# Patient Record
Sex: MALE | Race: WHITE | NOT HISPANIC OR LATINO | Employment: OTHER | URBAN - METROPOLITAN AREA
[De-identification: names, ages, dates, MRNs, and addresses within clinical notes are randomized per-mention and may not be internally consistent; named-entity substitution may affect disease eponyms.]

---

## 2018-05-11 ENCOUNTER — HOSPITAL ENCOUNTER (INPATIENT)
Facility: HOSPITAL | Age: 78
LOS: 2 days | Discharge: HOME | DRG: 384 | End: 2018-05-13
Attending: EMERGENCY MEDICINE | Admitting: INTERNAL MEDICINE
Payer: COMMERCIAL

## 2018-05-11 ENCOUNTER — APPOINTMENT (EMERGENCY)
Dept: CT IMAGING | Facility: HOSPITAL | Age: 78
DRG: 384 | End: 2018-05-11
Payer: COMMERCIAL

## 2018-05-11 DIAGNOSIS — K26.4: ICD-10-CM

## 2018-05-11 DIAGNOSIS — K29.80 DUODENITIS: Primary | ICD-10-CM

## 2018-05-11 DIAGNOSIS — K59.00 CONSTIPATION, UNSPECIFIED CONSTIPATION TYPE: ICD-10-CM

## 2018-05-11 PROBLEM — I10 HYPERTENSION: Status: ACTIVE | Noted: 2018-05-11

## 2018-05-11 PROBLEM — E11.9 DM2 (DIABETES MELLITUS, TYPE 2) (HCC): Status: ACTIVE | Noted: 2018-05-11

## 2018-05-11 PROBLEM — J44.9 COPD (CHRONIC OBSTRUCTIVE PULMONARY DISEASE) (HCC): Status: ACTIVE | Noted: 2018-05-11

## 2018-05-11 PROBLEM — N18.30 STAGE 3 CHRONIC KIDNEY DISEASE (HCC): Status: ACTIVE | Noted: 2018-05-11

## 2018-05-11 PROBLEM — I73.9 PAD (PERIPHERAL ARTERY DISEASE) (HCC): Status: ACTIVE | Noted: 2018-05-11

## 2018-05-11 LAB
ALBUMIN SERPL BCP-MCNC: 3.7 G/DL (ref 3.5–5)
ALP SERPL-CCNC: 90 U/L (ref 46–116)
ALT SERPL W P-5'-P-CCNC: 25 U/L (ref 12–78)
ANION GAP SERPL CALCULATED.3IONS-SCNC: 12 MMOL/L (ref 4–13)
AST SERPL W P-5'-P-CCNC: 22 U/L (ref 5–45)
BACTERIA UR QL AUTO: ABNORMAL /HPF
BASOPHILS # BLD MANUAL: 0 THOUSAND/UL (ref 0–0.1)
BASOPHILS NFR MAR MANUAL: 0 % (ref 0–1)
BILIRUB SERPL-MCNC: 0.4 MG/DL (ref 0.2–1)
BILIRUB UR QL STRIP: NEGATIVE
BUN SERPL-MCNC: 34 MG/DL (ref 5–25)
CALCIUM SERPL-MCNC: 9.5 MG/DL (ref 8.3–10.1)
CHLORIDE SERPL-SCNC: 98 MMOL/L (ref 100–108)
CLARITY UR: CLEAR
CO2 SERPL-SCNC: 24 MMOL/L (ref 21–32)
COLOR UR: YELLOW
CREAT SERPL-MCNC: 1.77 MG/DL (ref 0.6–1.3)
EOSINOPHIL # BLD MANUAL: 0.16 THOUSAND/UL (ref 0–0.4)
EOSINOPHIL NFR BLD MANUAL: 1 % (ref 0–6)
ERYTHROCYTE [DISTWIDTH] IN BLOOD BY AUTOMATED COUNT: 12.8 % (ref 11.6–15.1)
GFR SERPL CREATININE-BSD FRML MDRD: 36 ML/MIN/1.73SQ M
GLUCOSE SERPL-MCNC: 128 MG/DL (ref 65–140)
GLUCOSE SERPL-MCNC: 151 MG/DL (ref 65–140)
GLUCOSE SERPL-MCNC: 202 MG/DL (ref 65–140)
GLUCOSE UR STRIP-MCNC: NEGATIVE MG/DL
HCT VFR BLD AUTO: 37.9 % (ref 36.5–49.3)
HGB BLD-MCNC: 12.9 G/DL (ref 12–17)
HGB UR QL STRIP.AUTO: NEGATIVE
KETONES UR STRIP-MCNC: NEGATIVE MG/DL
LEUKOCYTE ESTERASE UR QL STRIP: NEGATIVE
LIPASE SERPL-CCNC: 257 U/L (ref 73–393)
LYMPHOCYTES # BLD AUTO: 17 % (ref 14–44)
LYMPHOCYTES # BLD AUTO: 2.68 THOUSAND/UL (ref 0.6–4.47)
MCH RBC QN AUTO: 33.3 PG (ref 26.8–34.3)
MCHC RBC AUTO-ENTMCNC: 34 G/DL (ref 31.4–37.4)
MCV RBC AUTO: 98 FL (ref 82–98)
METAMYELOCYTES NFR BLD MANUAL: 1 % (ref 0–1)
MONOCYTES # BLD AUTO: 0.95 THOUSAND/UL (ref 0–1.22)
MONOCYTES NFR BLD: 6 % (ref 4–12)
NEUTROPHILS # BLD MANUAL: 11.83 THOUSAND/UL (ref 1.85–7.62)
NEUTS SEG NFR BLD AUTO: 75 % (ref 43–75)
NITRITE UR QL STRIP: NEGATIVE
NON-SQ EPI CELLS URNS QL MICRO: ABNORMAL /HPF
PH UR STRIP.AUTO: 6.5 [PH] (ref 4.5–8)
PLATELET # BLD AUTO: 297 THOUSANDS/UL (ref 149–390)
PLATELET BLD QL SMEAR: ADEQUATE
PMV BLD AUTO: 10.7 FL (ref 8.9–12.7)
POTASSIUM SERPL-SCNC: 4.2 MMOL/L (ref 3.5–5.3)
PROT SERPL-MCNC: 7.9 G/DL (ref 6.4–8.2)
PROT UR STRIP-MCNC: ABNORMAL MG/DL
RBC # BLD AUTO: 3.87 MILLION/UL (ref 3.88–5.62)
RBC #/AREA URNS AUTO: ABNORMAL /HPF
SODIUM SERPL-SCNC: 134 MMOL/L (ref 136–145)
SP GR UR STRIP.AUTO: 1.02 (ref 1–1.03)
TOTAL CELLS COUNTED SPEC: 100
UROBILINOGEN UR QL STRIP.AUTO: 1 E.U./DL
WBC # BLD AUTO: 15.77 THOUSAND/UL (ref 4.31–10.16)
WBC #/AREA URNS AUTO: ABNORMAL /HPF

## 2018-05-11 PROCEDURE — 94640 AIRWAY INHALATION TREATMENT: CPT

## 2018-05-11 PROCEDURE — 99223 1ST HOSP IP/OBS HIGH 75: CPT | Performed by: INTERNAL MEDICINE

## 2018-05-11 PROCEDURE — 99285 EMERGENCY DEPT VISIT HI MDM: CPT

## 2018-05-11 PROCEDURE — 85007 BL SMEAR W/DIFF WBC COUNT: CPT | Performed by: PHYSICIAN ASSISTANT

## 2018-05-11 PROCEDURE — 81001 URINALYSIS AUTO W/SCOPE: CPT

## 2018-05-11 PROCEDURE — C9113 INJ PANTOPRAZOLE SODIUM, VIA: HCPCS | Performed by: PHYSICIAN ASSISTANT

## 2018-05-11 PROCEDURE — 99222 1ST HOSP IP/OBS MODERATE 55: CPT | Performed by: INTERNAL MEDICINE

## 2018-05-11 PROCEDURE — 83690 ASSAY OF LIPASE: CPT | Performed by: PHYSICIAN ASSISTANT

## 2018-05-11 PROCEDURE — 74176 CT ABD & PELVIS W/O CONTRAST: CPT

## 2018-05-11 PROCEDURE — 80053 COMPREHEN METABOLIC PANEL: CPT | Performed by: PHYSICIAN ASSISTANT

## 2018-05-11 PROCEDURE — 85027 COMPLETE CBC AUTOMATED: CPT | Performed by: PHYSICIAN ASSISTANT

## 2018-05-11 PROCEDURE — 36415 COLL VENOUS BLD VENIPUNCTURE: CPT | Performed by: PHYSICIAN ASSISTANT

## 2018-05-11 PROCEDURE — 82948 REAGENT STRIP/BLOOD GLUCOSE: CPT

## 2018-05-11 PROCEDURE — 94760 N-INVAS EAR/PLS OXIMETRY 1: CPT

## 2018-05-11 RX ORDER — OXYCODONE HYDROCHLORIDE 5 MG/1
5 TABLET ORAL EVERY 4 HOURS PRN
Status: DISCONTINUED | OUTPATIENT
Start: 2018-05-11 | End: 2018-05-11

## 2018-05-11 RX ORDER — HYDRALAZINE HYDROCHLORIDE 20 MG/ML
10 INJECTION INTRAMUSCULAR; INTRAVENOUS EVERY 6 HOURS PRN
Status: DISCONTINUED | OUTPATIENT
Start: 2018-05-11 | End: 2018-05-13 | Stop reason: HOSPADM

## 2018-05-11 RX ORDER — NICOTINE 21 MG/24HR
1 PATCH, TRANSDERMAL 24 HOURS TRANSDERMAL DAILY
Status: DISCONTINUED | OUTPATIENT
Start: 2018-05-11 | End: 2018-05-13 | Stop reason: HOSPADM

## 2018-05-11 RX ORDER — ACETAMINOPHEN 325 MG/1
650 TABLET ORAL EVERY 6 HOURS PRN
Status: DISCONTINUED | OUTPATIENT
Start: 2018-05-11 | End: 2018-05-11

## 2018-05-11 RX ORDER — AMLODIPINE BESYLATE 5 MG/1
10 TABLET ORAL DAILY
Status: DISCONTINUED | OUTPATIENT
Start: 2018-05-12 | End: 2018-05-13 | Stop reason: HOSPADM

## 2018-05-11 RX ORDER — HYDROCHLOROTHIAZIDE 25 MG/1
25 TABLET ORAL DAILY
COMMUNITY
End: 2018-12-09 | Stop reason: ALTCHOICE

## 2018-05-11 RX ORDER — HYDROCHLOROTHIAZIDE 25 MG/1
25 TABLET ORAL DAILY
Status: DISCONTINUED | OUTPATIENT
Start: 2018-05-12 | End: 2018-05-13 | Stop reason: HOSPADM

## 2018-05-11 RX ORDER — CHLORTHALIDONE 25 MG/1
25 TABLET ORAL DAILY
Status: DISCONTINUED | OUTPATIENT
Start: 2018-05-12 | End: 2018-05-13 | Stop reason: HOSPADM

## 2018-05-11 RX ORDER — BUDESONIDE AND FORMOTEROL FUMARATE DIHYDRATE 160; 4.5 UG/1; UG/1
2 AEROSOL RESPIRATORY (INHALATION) 2 TIMES DAILY
Status: DISCONTINUED | OUTPATIENT
Start: 2018-05-11 | End: 2018-05-13 | Stop reason: HOSPADM

## 2018-05-11 RX ORDER — ASPIRIN 81 MG/1
81 TABLET ORAL DAILY
COMMUNITY

## 2018-05-11 RX ORDER — AMLODIPINE BESYLATE 10 MG/1
10 TABLET ORAL 2 TIMES DAILY
Status: ON HOLD | COMMUNITY
End: 2018-12-16

## 2018-05-11 RX ORDER — PRAVASTATIN SODIUM 40 MG
40 TABLET ORAL
Status: DISCONTINUED | OUTPATIENT
Start: 2018-05-11 | End: 2018-05-13 | Stop reason: HOSPADM

## 2018-05-11 RX ORDER — LISINOPRIL 40 MG/1
40 TABLET ORAL DAILY
Status: ON HOLD | COMMUNITY
End: 2018-12-16

## 2018-05-11 RX ORDER — IPRATROPIUM BROMIDE AND ALBUTEROL SULFATE 2.5; .5 MG/3ML; MG/3ML
3 SOLUTION RESPIRATORY (INHALATION)
Status: DISCONTINUED | OUTPATIENT
Start: 2018-05-11 | End: 2018-05-11

## 2018-05-11 RX ORDER — LISINOPRIL 10 MG/1
40 TABLET ORAL DAILY
Status: DISCONTINUED | OUTPATIENT
Start: 2018-05-12 | End: 2018-05-13 | Stop reason: HOSPADM

## 2018-05-11 RX ORDER — SODIUM CHLORIDE 9 MG/ML
75 INJECTION, SOLUTION INTRAVENOUS CONTINUOUS
Status: DISCONTINUED | OUTPATIENT
Start: 2018-05-11 | End: 2018-05-13 | Stop reason: HOSPADM

## 2018-05-11 RX ORDER — CLOPIDOGREL BISULFATE 75 MG/1
75 TABLET ORAL DAILY
COMMUNITY

## 2018-05-11 RX ORDER — CHLORTHALIDONE 25 MG/1
25 TABLET ORAL DAILY
COMMUNITY
End: 2018-12-16 | Stop reason: HOSPADM

## 2018-05-11 RX ORDER — TAMSULOSIN HYDROCHLORIDE 0.4 MG/1
0.4 CAPSULE ORAL
Status: DISCONTINUED | OUTPATIENT
Start: 2018-05-11 | End: 2018-05-13 | Stop reason: HOSPADM

## 2018-05-11 RX ORDER — ONDANSETRON 2 MG/ML
4 INJECTION INTRAMUSCULAR; INTRAVENOUS EVERY 4 HOURS PRN
Status: DISCONTINUED | OUTPATIENT
Start: 2018-05-11 | End: 2018-05-13 | Stop reason: HOSPADM

## 2018-05-11 RX ORDER — TAMSULOSIN HYDROCHLORIDE 0.4 MG/1
0.4 CAPSULE ORAL
COMMUNITY

## 2018-05-11 RX ORDER — PANTOPRAZOLE SODIUM 40 MG/1
40 INJECTION, POWDER, FOR SOLUTION INTRAVENOUS EVERY 12 HOURS SCHEDULED
Status: DISCONTINUED | OUTPATIENT
Start: 2018-05-11 | End: 2018-05-12 | Stop reason: CLARIF

## 2018-05-11 RX ORDER — BISACODYL 10 MG
10 SUPPOSITORY, RECTAL RECTAL ONCE
Status: COMPLETED | OUTPATIENT
Start: 2018-05-11 | End: 2018-05-11

## 2018-05-11 RX ORDER — REPAGLINIDE 0.5 MG/1
0.5 TABLET ORAL
COMMUNITY

## 2018-05-11 RX ORDER — PRAVASTATIN SODIUM 40 MG
40 TABLET ORAL DAILY
COMMUNITY

## 2018-05-11 RX ORDER — CLOPIDOGREL BISULFATE 75 MG/1
75 TABLET ORAL DAILY
Status: DISCONTINUED | OUTPATIENT
Start: 2018-05-12 | End: 2018-05-12

## 2018-05-11 RX ADMIN — METOPROLOL TARTRATE 25 MG: 25 TABLET ORAL at 20:14

## 2018-05-11 RX ADMIN — PANTOPRAZOLE SODIUM 40 MG: 40 INJECTION, POWDER, FOR SOLUTION INTRAVENOUS at 20:14

## 2018-05-11 RX ADMIN — PRAVASTATIN SODIUM 40 MG: 40 TABLET ORAL at 18:54

## 2018-05-11 RX ADMIN — IPRATROPIUM BROMIDE AND ALBUTEROL SULFATE 3 ML: .5; 3 SOLUTION RESPIRATORY (INHALATION) at 16:50

## 2018-05-11 RX ADMIN — BUDESONIDE AND FORMOTEROL FUMARATE DIHYDRATE 2 PUFF: 160; 4.5 AEROSOL RESPIRATORY (INHALATION) at 19:31

## 2018-05-11 RX ADMIN — TAMSULOSIN HYDROCHLORIDE 0.4 MG: 0.4 CAPSULE ORAL at 18:54

## 2018-05-11 RX ADMIN — NICOTINE 1 PATCH: 21 PATCH, EXTENDED RELEASE TRANSDERMAL at 16:27

## 2018-05-11 RX ADMIN — IPRATROPIUM BROMIDE AND ALBUTEROL SULFATE 3 ML: .5; 3 SOLUTION RESPIRATORY (INHALATION) at 19:37

## 2018-05-11 RX ADMIN — SODIUM CHLORIDE 75 ML/HR: 0.9 INJECTION, SOLUTION INTRAVENOUS at 16:31

## 2018-05-11 RX ADMIN — BISACODYL 10 MG: 10 SUPPOSITORY RECTAL at 11:37

## 2018-05-11 NOTE — H&P
H&P- Eula Gaines 1940, 68 y o  male MRN: [id number]    Unit/Bed#: ED 16 Encounter: [id number]    Primary Care Provider: Mauro Johnson MD   Date and time admitted to hospital: 5/11/2018 10:02 AM  * Duodenitis   Assessment & Plan    · Patient presented with 6 days of constipation and mild abdominal pain  · CT scan of the abdomen showed concern for duodenitis with transmural ulcer  · Appreciate involvement from Gastroenterology and surgery  · Will provide IV Protonix and keep the patient nothing by mouth for now with IVF  Will provide pain medication  Defer to surgery whether antibiotics are indicated  Defer to GI as far as when to pursue EGD        Stage 3 chronic kidney disease   Assessment & Plan    · Patient follows with [facility], he is currently within his baseline creatinine which seems to be up to 2 0  · Continue to monitor        Hypertension   Assessment & Plan    · Patient with baseline hypertension, with significant uncontrolled hypertension in the emergency room  Also noted that he has significant renal artery calcifications on his CT scan  · Continue home antihypertensive medications and ongoing follow-up with Nephrology as an outpatient  · P r n  IV hydralazine        DM2 (diabetes mellitus, type 2) ([facility])   Assessment & Plan    · Accu-Cheks q 6 hours while nothing by mouth with sliding scale coverage  Hold patient's prandin and linagliptin        COPD (chronic obstructive pulmonary disease) ([facility])   Assessment & Plan    · COPD without exacerbation with ongoing tobacco abuse  · Recommend respiratory protocol with DuoNeb and continue Symbicort    · Nicotine patch        PAD (peripheral artery disease) ([facility] [address] )   Assessment & Plan    · Patient with significant diffuse peripheral arterial disease and calcifications with previous stents placed          VTE Prophylaxis: Pharmacologic VTE Prophylaxis contraindicated due to possible ulcer  / sequential compression device   Code Status: full code per our discussion  POLST: There is no POLST form on file for this patient (pre-hospital)  Discussion with family:     Anticipated Length of Stay:  Patient will be admitted on an Inpatient basis with an anticipated length of stay of  Greater than 2 midnights  Justification for Hospital Stay: duodenitis    Total Time for Visit, including Counseling / Coordination of Care: 1 hour  Greater than 50% of this total time spent on direct patient counseling and coordination of care  Case was discussed by my attending with both surgery and the GI team    Chief Complaint:   Constipation    History of Present Illness:    Katty Kaufman is a 68 y o  male who presents with 6 days of inability to move his bowels  He felt this was quite atypical for him as he usually has regular bowel movements  He denies any black or bloody stools prior to the onset of constipation  He reports his last colonoscopy was about 10 years or so ago  He denied any nausea or vomiting and was intermittently passing gas  His appetite had dropped off a bit although he usually does have a good appetite overall  He tried a laxative at home and when that did not work he felt he should come in to get the issue taking care of  He does admit to some central/epigastric pain although he states the pain is not what brought him into the hospital and does not rate it as severe  He has not been having fever or chills and is currently comfortable  Review of Systems:    Review of Systems   Constitutional: Positive for appetite change (Patient reports his appetite was good until just recently)  Negative for activity change, chills, diaphoresis, fatigue, fever and unexpected weight change  Patient reports our scale has him weighing 8 lb less than the last time he was checked in his doctor's office but he does not actually notice having lost weight   HENT: Negative for trouble swallowing      Respiratory: Positive for cough (Chronic unchanged) and shortness of breath ("Here and there" not changed)  Negative for apnea, choking, chest tightness, wheezing and stridor  Cardiovascular: Negative for chest pain, palpitations and leg swelling  Gastrointestinal: Positive for abdominal pain and constipation  Negative for abdominal distention, anal bleeding, blood in stool, diarrhea, nausea and vomiting  Genitourinary: Negative for difficulty urinating and dysuria  Musculoskeletal: Negative for arthralgias, back pain, gait problem, joint swelling and myalgias  Skin: Negative for color change, pallor, rash and wound  Neurological: Negative for dizziness, tremors, syncope, speech difficulty, weakness, light-headedness, numbness and headaches  Psychiatric/Behavioral: Negative for confusion  Past Medical and Surgical History:     Past Medical History:   Diagnosis Date    [facility]ch[facility] obstructive [facility] disease) ([facility] )     Diabetes mellitus ([place] [address] )     History of heart attack     Hyperlipidemia     Hypertension     History of stents placed by vascular surgery    Past Surgical History:   Procedure Laterality Date    APPENDECTOMY      BACK SURGERY         Meds/Allergies:    Prior to Admission medications    Medication Sig Start Date End Date Taking?  Authorizing Provider   amLODIPine (NORVASC) 10 mg tablet Take 10 mg by mouth daily   Yes Historical Provider, MD   aspirin (ECOTRIN LOW STRENGTH) 81 mg EC tablet Take 81 mg by mouth daily   Yes Historical Provider, MD   Budesonide-Formoterol Fumarate (SYMBICORT IN) Inhale   Yes Historical Provider, MD   chlorthalidone 25 mg tablet Take 25 mg by mouth daily   Yes Historical Provider, MD   clopidogrel (PLAVIX) 75 mg tablet Take 75 mg by mouth daily   Yes Historical Provider, MD   hydrochlorothiazide (HYDRODIURIL) 25 mg tablet Take 25 mg by mouth daily   Yes Historical Provider, MD   Linagliptin (TRADJENTA) 5 MG TABS Take 5 mg by mouth daily   Yes Historical Provider, MD   lisinopril (ZESTRIL) 40 mg tablet Take 40 mg by mouth daily   Yes Historical Provider, MD   metoprolol tartrate (LOPRESSOR) 25 mg tablet Take 25 mg by mouth every 12 (twelve) hours   Yes Historical Provider, MD   pravastatin (PRAVACHOL) 40 mg tablet Take 40 mg by mouth daily   Yes Historical Provider, MD   repaglinide (PRANDIN) 0 5 mg tablet Take 0 5 mg by mouth 3 (three) times a day before meals   Yes Historical Provider, MD   SODIUM BICARBONATE PO Take by mouth   Yes Historical Provider, MD   tamsulosin (FLOMAX) 0 4 mg Take 0 4 mg by mouth daily with dinner   Yes Historical Provider, MD     I have reviewed home medications with patient personally  He denies any use of NSAIDs  Allergies: Allergies   Allergen Reactions    Contrast [Iodinated Diagnostic Agents]        Social History:     Marital Status: Unknown   Occupation:   Patient Pre-hospital Living Situation:  Lives with his son  Patient Pre-hospital Level of Mobility:  No assistive devices  Patient Pre-hospital Diet Restrictions:   Substance Use History:   History   Alcohol Use No     History   Smoking Status    Current Every Day Smoker    Packs/day: 0 50   Smokeless Tobacco    Never Used     History   Drug Use No    Patient reports he has smoked for at least the last 60 years  He does not drink any alcohol    Family History:    non-contributory    Physical Exam:     Vitals:   Blood Pressure: (!) 204/93 (05/11/18 1400)  Pulse: 72 (05/11/18 1400)  Temperature: (!) 97 4 °F (36 3 °C) (05/11/18 0926)  Temp Source: Oral (05/11/18 0926)  Respirations: 17 (05/11/18 1400)  Weight - Scale: 64 5 kg (142 lb 3 2 oz) (05/11/18 0926)  SpO2: 96 % (05/11/18 1400)    Physical Exam   Constitutional: He appears well-developed and well-nourished  No distress  HENT:   Head: Normocephalic and atraumatic  Mouth/Throat: Oropharynx is clear and moist  No oropharyngeal exudate  He is nearly completely edentulous   Eyes: Conjunctivae are normal  Right eye exhibits no discharge   Left eye exhibits no discharge  No scleral icterus  Neck: Neck supple  Cardiovascular: Normal rate, regular rhythm, normal heart sounds and intact distal pulses  No murmur heard  Pulmonary/Chest: Effort normal  No respiratory distress  He has no wheezes  Intermittent moist cough noted during our conversation but he is not dyspneic at all and no wheezes are appreciated  O2 sat 94% on room air  Abdominal: Soft  He exhibits no distension  There is no tenderness  There is no rebound and no guarding  No significant tenderness noted to palpation of his abdomen   Musculoskeletal: He exhibits no edema, tenderness or deformity  Neurological: He is alert  Awake alert and interactive is good historian with no focal deficits   Skin: Skin is warm and dry  No rash noted  He is not diaphoretic  No erythema  No pallor  Psychiatric: He has a normal mood and affect  His behavior is normal  Judgment and thought content normal    Vitals reviewed  Additional Data:     Lab Results: I have personally reviewed pertinent reports  Results from last 7 days  Lab Units 05/11/18  1134   WBC Thousand/uL 15 77*   HEMOGLOBIN g/dL 12 9   HEMATOCRIT % 37 9   PLATELETS Thousands/uL 297   LYMPHO PCT % 17   MONO PCT MAN % 6   EOSINO PCT MANUAL % 1       Results from last 7 days  Lab Units 05/11/18  1134   SODIUM mmol/L 134*   POTASSIUM mmol/L 4 2   CHLORIDE mmol/L 98*   CO2 mmol/L 24   BUN mg/dL 34*   CREATININE mg/dL 1 77*   CALCIUM mg/dL 9 5   TOTAL PROTEIN g/dL 7 9   BILIRUBIN TOTAL mg/dL 0 40   ALK PHOS U/L 90   ALT U/L 25   AST U/L 22   GLUCOSE RANDOM mg/dL 202*                   Imaging: I have personally reviewed pertinent reports  CT abdomen pelvis wo contrast   Final Result by Irvin Bartlett MD (05/11 1159)      Nonspecific moderate proximal duodenitis with focal subtotal transmural ulceration  No abscess or free intraperitoneal gas  Colonic diverticulosis without acute diverticulitis        2 subcentimeter hypodense nodules in the left kidney likely representing hemorrhagic cyst   This could be followed up with nonemergent renal sonogram       Severe atherosclerotic vascular disease  Severe calcific narrowing at the origin of the superior mesenteric and bilateral renal arteries  The study was marked in [facility] for immediate notification  Workstation performed: [id number]             EKG, Pathology, and Other Studies Reviewed on Admission:   ·     Allscripts / Epic Records Reviewed: Yes     ** Please Note: This note has been constructed using a voice recognition system   **

## 2018-05-11 NOTE — ED NOTES
Provider reviewed results with patient and son at bedside  Patient will be admitted        José Miguel Sutton RN  05/11/18 6255

## 2018-05-11 NOTE — ASSESSMENT & PLAN NOTE
· Patient presented with 6 days of constipation and mild abdominal pain  · CT scan of the abdomen showed concern for duodenitis with transmural ulcer  · Appreciate involvement from Gastroenterology and surgery  · Will provide IV Protonix and keep the patient nothing by mouth for now with IVF  Will provide pain medication  Defer to surgery whether antibiotics are indicated    Defer to GI as far as when to pursue EGD

## 2018-05-11 NOTE — CONSULTS
Consultation - [facility]  Kristina Pace 68 y o  male MRN: [id number]  Unit/Bed#: ED 16 Encounter: [id number]    Inpatient consult to gastroenterology  Consult performed by: Melba Velazquez ordered by: Kev Ortega      Reason for Consult / Principal Problem: duodenitis      ASSESSMENT AND PLAN:      Duodenal Ulcer  Abdominal pain  -CT with moderate proximal duodenitis with focal subtotal transmural ulceration/intramural gas without evidence of intraperitoneal gas  -Concern for deep ulceration, abdominal exam is fortunately benign  -Would keep NPO at this time  -Recommend surgical consultation  -Recommend PPI BID  -Will hold off on EGD at this time given concern for potential perforation    Constipation   -CT without evidence of obstruction  -recommend dulcolax suppositories at this time consider enema    ______________________________________________________________________    HPI:      Patient is a 67 y/o male with a PMH of CAD, PAD, carotid artery stenosis, DM, COPD, CKD who presents with constipation and abdominal pain  He states he has not had a bowel movement in 6 days and over the counter laxatives were not effective  He states he has had abdominal bloating and distension and abdominal pain in the mid abdomen  He denies nausea, vomiting, or severe abdominal pain  He denies pain with eating  He denies prior issues with constipation, typically he has a bowel movement once daily  He has had a colonoscopy about 10 years ago  He has never had an EGD  He denies NSAID use with the exception of ASA, he is on plavix as well  He is a smoker  CT without contrast shows moderate thickening of the duodenal bulb, focal ulceration of the right lateral wall with focal intramural gas without evidence of abscess of perforation  It also shows severe vascular disease, with severe narrowing at the origin of the SMA and bilateral renal arteries  His white count is elevated at 15 77   He denies fevers, chills, or sick contacts  REVIEW OF SYSTEMS:    CONSTITUTIONAL: Denies any fever, chills, rigors, and weight loss  HEENT: No earache or tinnitus  Denies hearing loss or visual disturbances  CARDIOVASCULAR: No chest pain or palpitations  RESPIRATORY: Denies any cough, hemoptysis, shortness of breath or dyspnea on exertion  GASTROINTESTINAL: As noted in the History of Present Illness  GENITOURINARY: No problems with urination  Denies any hematuria or dysuria  NEUROLOGIC: No dizziness or vertigo, denies headaches  MUSCULOSKELETAL: Denies any muscle or joint pain  SKIN: Denies skin rashes or itching  ENDOCRINE: Denies excessive thirst  Denies intolerance to heat or cold  PSYCHOSOCIAL: Denies depression or anxiety  Denies any recent memory loss  Historical Information   Past Medical History:   Diagnosis Date    [facility] )     Diabetes mellitus ([facility] )     History of heart attack     Hyperlipidemia     Hypertension      Past Surgical History:   Procedure Laterality Date    APPENDECTOMY      BACK SURGERY       Social History   History   Alcohol Use No     History   Drug Use No     History   Smoking Status    Current Every Day Smoker    Packs/day: 0 50   Smokeless Tobacco    Never Used     History reviewed  No pertinent family history  Meds/Allergies       (Not in a hospital admission)  No current facility-administered medications for this encounter  Allergies   Allergen Reactions    Contrast [Iodinated Diagnostic Agents]      Objective     Blood pressure (!) 184/85, pulse 76, temperature (!) 97 4 °F (36 3 °C), temperature source Oral, resp  rate 17, weight 64 5 kg (142 lb 3 2 oz), SpO2 96 %  There is no height or weight on file to calculate BMI      No intake or output data in the 24 hours ending 05/11/18 1345      PHYSICAL EXAM:      General Appearance:   Alert, cooperative, no distress   HEENT:   Normocephalic, atraumatic, anicteric, poor dentition    Neck:  Supple, symmetrical, trachea midline   Lungs:   Clear to auscultation bilaterally   Heart[de-identified]   Regular rate and rhythm; no murmur, rub, or gallop     Abdomen:   Soft, mild tenderness with deep palpation, non-distended; normal bowel sounds   Genitalia:   Deferred    Rectal:   Deferred            Skin:  No jaundice, rashes, or lesions    Lymph nodes:  No palpable cervical lymphadenopathy        Lab Results:   Admission on 05/11/2018   Component Date Value    WBC 05/11/2018 15 77*    RBC 05/11/2018 3 87*    Hemoglobin 05/11/2018 12 9     Hematocrit 05/11/2018 37 9     MCV 05/11/2018 98     MCH 05/11/2018 33 3     MCHC 05/11/2018 34 0     RDW 05/11/2018 12 8     MPV 05/11/2018 10 7     Platelets 62/55/3871 297     Sodium 05/11/2018 134*    Potassium 05/11/2018 4 2     Chloride 05/11/2018 98*    CO2 05/11/2018 24     Anion Gap 05/11/2018 12     BUN 05/11/2018 34*    Creatinine 05/11/2018 1 77*    Glucose 05/11/2018 202*    Calcium 05/11/2018 9 5     AST 05/11/2018 22     ALT 05/11/2018 25     Alkaline Phosphatase 05/11/2018 90     Total Protein 05/11/2018 7 9     Albumin 05/11/2018 3 7     Total Bilirubin 05/11/2018 0 40     eGFR 05/11/2018 36     Lipase 05/11/2018 257     Segmented % 05/11/2018 75     Lymphocytes % 05/11/2018 17     Monocytes % 05/11/2018 6     Eosinophils % 05/11/2018 1     Basophils % 05/11/2018 0     Metamyelocytes% 05/11/2018 1     Absolute Neutrophils 05/11/2018 11 83*    Lymphocytes Absolute 05/11/2018 2 68     Monocytes Absolute 05/11/2018 0 95     Eosinophils Absolute 05/11/2018 0 16     Basophils Absolute 05/11/2018 0 00     Total Counted 05/11/2018 100     Platelet Estimate 76/97/3386 Adequate     Color, UA 05/11/2018 Yellow     Clarity, UA 05/11/2018 Clear     pH, UA 05/11/2018 6 5     Leukocytes, UA 05/11/2018 Negative     Nitrite, UA 05/11/2018 Negative     Protein, UA 05/11/2018 100 (2+)*    Glucose, UA 05/11/2018 Negative     Ketones, UA 05/11/2018 Negative     Urobilinogen, UA 05/11/2018 1 0     Bilirubin, UA 05/11/2018 Negative     Blood, UA 05/11/2018 Negative     Specific Gravity, UA 05/11/2018 1 020     RBC, UA 05/11/2018 0-1*    WBC, UA 05/11/2018 0-1*    Epithelial Cells 05/11/2018 Occasional     Bacteria, UA 05/11/2018 Occasional        Imaging Studies: I have personally reviewed pertinent imaging studies

## 2018-05-11 NOTE — ASSESSMENT & PLAN NOTE
· Patient follows with [facility], he is currently within his baseline creatinine which seems to be up to 2 0  · Continue to monitor

## 2018-05-11 NOTE — PLAN OF CARE
DISCHARGE PLANNING     Discharge to home or other facility with appropriate resources Progressing        GASTROINTESTINAL - ADULT     Minimal or absence of nausea and/or vomiting Progressing     Maintains or returns to baseline bowel function Progressing     Maintains adequate nutritional intake Progressing        INFECTION - ADULT     Absence or prevention of progression during hospitalization Progressing        Knowledge Deficit     Patient/family/caregiver demonstrates understanding of disease process, treatment plan, medications, and discharge instructions Progressing        PAIN - ADULT     Verbalizes/displays adequate comfort level or baseline comfort level Progressing        SAFETY ADULT     Patient will remain free of falls Progressing     Maintain or return to baseline ADL function Progressing     Maintain or return mobility status to optimal level Progressing

## 2018-05-11 NOTE — ED PROVIDER NOTES
History  Chief Complaint   Patient presents with    Constipation     Pt presents to the ED with generalized abd pain/bloating  Reprots lack of bowel movement x 6 days  Denies prior hx of obstruction or abd surgical hx  Reports nausea no vomiting  OTC home laxatives uneffective       44-year-old male presents to the emergency department with complaints of constipation  States he has been unable to have a bowel movement in the past 6 days  Patient states that he has been having small, hard bowel movements during this time  Did try taking a stool softener and laxative 3 and 4 days ago without much relief of symptoms  States he is also experiencing some epigastric abdominal pain  Reports the pain seems to be waxing and waning in nature  States that has been eating last due to pain in his lost approximately 8 lb in the past 2-4 weeks  He has no fevers  Some nausea without vomiting  No previous abdominal surgeries  History provided by:  Patient   used: No        Prior to Admission Medications   Prescriptions Last Dose Informant Patient Reported? Taking?    Budesonide-Formoterol Fumarate (SYMBICORT IN)   Yes Yes   Sig: Inhale   Linagliptin (TRADJENTA) 5 MG TABS   Yes Yes   Sig: Take 5 mg by mouth daily   SODIUM BICARBONATE PO   Yes Yes   Sig: Take by mouth   amLODIPine (NORVASC) 10 mg tablet   Yes Yes   Sig: Take 10 mg by mouth daily   aspirin (ECOTRIN LOW STRENGTH) 81 mg EC tablet   Yes Yes   Sig: Take 81 mg by mouth daily   chlorthalidone 25 mg tablet   Yes Yes   Sig: Take 25 mg by mouth daily   clopidogrel (PLAVIX) 75 mg tablet   Yes Yes   Sig: Take 75 mg by mouth daily   hydrochlorothiazide (HYDRODIURIL) 25 mg tablet   Yes Yes   Sig: Take 25 mg by mouth daily   lisinopril (ZESTRIL) 40 mg tablet   Yes Yes   Sig: Take 40 mg by mouth daily   metoprolol tartrate (LOPRESSOR) 25 mg tablet   Yes Yes   Sig: Take 25 mg by mouth every 12 (twelve) hours   pravastatin (PRAVACHOL) 40 mg tablet Yes Yes   Sig: Take 40 mg by mouth daily   repaglinide (PRANDIN) 0 5 mg tablet   Yes Yes   Sig: Take 0 5 mg by mouth 3 (three) times a day before meals   tamsulosin (FLOMAX) 0 4 mg   Yes Yes   Sig: Take 0 4 mg by mouth daily with dinner      Facility-Administered Medications: None       Past Medical History:   Diagnosis Date    [facility]ch[facility] obstructive [facility] disease) ([facility] )     Diabetes mellitus ([place] [address] )     History of heart attack     Hyperlipidemia     Hypertension        Past Surgical History:   Procedure Laterality Date    APPENDECTOMY      BACK SURGERY         History reviewed  No pertinent family history  I have reviewed and agree with the history as documented  Social History   Substance Use Topics    Smoking status: Current Every Day Smoker     Packs/day: 0 50    Smokeless tobacco: Never Used    Alcohol use No        Review of Systems   Constitutional: Negative for activity change, appetite change, chills and fever  HENT: Negative for congestion, dental problem, drooling, ear discharge, ear pain, mouth sores, nosebleeds, rhinorrhea, sore throat and trouble swallowing  Eyes: Negative for pain, discharge and itching  Respiratory: Negative for cough, chest tightness, shortness of breath and wheezing  Cardiovascular: Negative for chest pain and palpitations  Gastrointestinal: Positive for abdominal pain, constipation and nausea  Negative for blood in stool, diarrhea and vomiting  Endocrine: Negative for cold intolerance and heat intolerance  Genitourinary: Negative for difficulty urinating, dysuria, flank pain, frequency and urgency  Skin: Negative for rash and wound  Allergic/Immunologic: Negative for food allergies and immunocompromised state  Neurological: Negative for dizziness, seizures, syncope, weakness, numbness and headaches  Psychiatric/Behavioral: Negative for agitation, behavioral problems and confusion         Physical Exam  ED Triage Vitals [05/11/18 0926] Temperature Pulse Respirations Blood Pressure SpO2   (!) 97 4 °F (36 3 °C) 96 16 170/77 95 %      Temp Source Heart Rate Source Patient Position - Orthostatic VS BP Location FiO2 (%)   Oral Monitor Sitting Left arm --      Pain Score       4           Orthostatic Vital Signs  Vitals:    05/11/18 1130 05/11/18 1237 05/11/18 1300 05/11/18 1400   BP: (!) 189/86 (!) 186/77 (!) 184/85 (!) 204/93   Pulse: 88 89 76 72   Patient Position - Orthostatic VS: Lying Lying Lying Lying       Physical Exam   Constitutional: He is oriented to person, place, and time  He appears well-developed and well-nourished  No distress  HENT:   Head: Normocephalic and atraumatic  Right Ear: External ear normal    Left Ear: External ear normal    Mouth/Throat: Oropharynx is clear and moist  No oropharyngeal exudate  Eyes: Conjunctivae are normal    Neck: No JVD present  No tracheal deviation present  Cardiovascular: Normal rate, regular rhythm and normal heart sounds  Exam reveals no gallop and no friction rub  No murmur heard  Pulmonary/Chest: No respiratory distress  He has wheezes  He has no rales  He exhibits no tenderness  Abdominal: Soft  Bowel sounds are normal  He exhibits no distension  There is tenderness in the epigastric area  There is no guarding and no CVA tenderness  Patient refused rectal exam   Musculoskeletal: Normal range of motion  He exhibits no edema, tenderness or deformity  Lymphadenopathy:     He has no cervical adenopathy  Neurological: He is alert and oriented to person, place, and time  Skin: Skin is warm and dry  No rash noted  He is not diaphoretic  No erythema  Psychiatric: He has a normal mood and affect  His behavior is normal    Nursing note and vitals reviewed        ED Medications  Medications   bisacodyl (DULCOLAX) rectal suppository 10 mg (10 mg Rectal Given 5/11/18 1137)       Diagnostic Studies  Results Reviewed     Procedure Component Value Units Date/Time    Urine Microscopic [[id number]]  (Abnormal) Collected:  05/11/18 1236    Lab Status:  Final result Specimen:  Urine from Urine, Clean Catch Updated:  05/11/18 1254     RBC, UA 0-1 (A) /hpf      WBC, UA 0-1 (A) /hpf      Epithelial Cells Occasional /hpf      Bacteria, UA Occasional /hpf     ED Urine Macroscopic [[id number]]  (Abnormal) Collected:  05/11/18 1236    Lab Status:  Final result Specimen:  Urine Updated:  05/11/18 1233     Color, UA Yellow     Clarity, UA Clear     pH, UA 6 5     Leukocytes, UA Negative     Nitrite, UA Negative     Protein,  (2+) (A) mg/dl      Glucose, UA Negative mg/dl      Ketones, UA Negative mg/dl      Urobilinogen, UA 1 0 E U /dl      Bilirubin, UA Negative     Blood, UA Negative     Specific Gravity, UA 1 020    Narrative:       CLINITEK RESULT    CBC and differential [[id number]]  (Abnormal) Collected:  05/11/18 1134    Lab Status:  Final result Specimen:  Blood from Arm, Right Updated:  05/11/18 1231     WBC 15 77 (H) Thousand/uL      RBC 3 87 (L) Million/uL      Hemoglobin 12 9 g/dL      Hematocrit 37 9 %      MCV 98 fL      MCH 33 3 pg      MCHC 34 0 g/dL      RDW 12 8 %      MPV 10 7 fL      Platelets 966 Thousands/uL     Lipase [[id number]]  (Normal) Collected:  05/11/18 1134    Lab Status:  Final result Specimen:  Blood from Arm, Right Updated:  05/11/18 1203     Lipase 257 u/L     Comprehensive metabolic panel [[id number]]  (Abnormal) Collected:  05/11/18 1134    Lab Status:  Final result Specimen:  Blood from Arm, Right Updated:  05/11/18 1203     Sodium 134 (L) mmol/L      Potassium 4 2 mmol/L      Chloride 98 (L) mmol/L      CO2 24 mmol/L      Anion Gap 12 mmol/L      BUN 34 (H) mg/dL      Creatinine 1 77 (H) mg/dL      Glucose 202 (H) mg/dL      Calcium 9 5 mg/dL      AST 22 U/L      ALT 25 U/L      Alkaline Phosphatase 90 U/L      Total Protein 7 9 g/dL      Albumin 3 7 g/dL      Total Bilirubin 0 40 mg/dL      eGFR 36 ml/min/1 73sq m     Narrative:         National Kidney Disease Education Program recommendations are as follows:  GFR calculation is accurate only with a steady state creatinine  Chronic Kidney disease less than 60 ml/min/1 73 sq  meters  Kidney failure less than 15 ml/min/1 73 sq  meters  CT abdomen pelvis wo contrast   Final Result by Lorri Gordon MD (05/11 0470)      Nonspecific moderate proximal duodenitis with focal subtotal transmural ulceration  No abscess or free intraperitoneal gas  Colonic diverticulosis without acute diverticulitis  2 subcentimeter hypodense nodules in the left kidney likely representing hemorrhagic cyst   This could be followed up with nonemergent renal sonogram       Severe atherosclerotic vascular disease  Severe calcific narrowing at the origin of the superior mesenteric and bilateral renal arteries  The study was marked in [facility] for immediate notification  Workstation performed: [id number]                    Procedures  Procedures       Phone Contacts  ED Phone Contact    ED Course  ED Course as of May 11 1418   Fri May 11, 2018   1246   Had a long discussion with the patient and his son regarding disposition  States that he is aware of CT findings showing hemorrhagic cyst of the left kidney  For currently follow with a kidney specialist   Discussed findings of duodenitis which are likely leading to upper abdominal pain which is worse with eating  The patient has some not tell me that he has lost 8 lb in the past for 6 weeks from inability to eat  Will admit for IV antibiotics and GI consult  MDM  Number of Diagnoses or Management Options  Constipation, unspecified constipation type:   Duodenitis:   Diagnosis management comments:  Differential diagnosis includes but not limited to:   Constipation, intra-abdominal infection, gastritis, pancreatitis, duodenitis         Amount and/or Complexity of Data Reviewed  Clinical lab tests: ordered  Tests in the radiology section of CPT®: ordered and reviewed  Discuss the patient with other providers: yes      CritCare Time    Disposition  Final diagnoses:   Duodenitis   Constipation, unspecified constipation type     Time reflects when diagnosis was documented in both MDM as applicable and the Disposition within this note     Time User Action Codes Description Comment    5/11/2018 12:54 PM Sanjuanita Lemons Add [K29 80] Duodenitis     5/11/2018 12:55 PM Roddy Levy Add [K59 00] Constipation, unspecified constipation type       ED Disposition     ED Disposition Condition Comment    Admit  Case was discussed with EVELIN and the patient's admission status was agreed to be Admission Status: inpatient status to the service of Dr LUCAS   Follow-up Information    None       Patient's Medications   Discharge Prescriptions    No medications on file     No discharge procedures on file      ED Provider  Electronically Signed by           Margaux Shafer PA-C  05/11/18 4675

## 2018-05-11 NOTE — ASSESSMENT & PLAN NOTE
· COPD without exacerbation with ongoing tobacco abuse  · Recommend respiratory protocol with DuoNeb and continue Symbicort    · Nicotine patch

## 2018-05-11 NOTE — ASSESSMENT & PLAN NOTE
· Patient with significant diffuse peripheral arterial disease and calcifications with previous stents placed

## 2018-05-11 NOTE — CONSULTS
Consultation -General Surgery  Nile Castellano 68 y o  male MRN: [id number]  Unit/Bed#: ED 16 Encounter: [id number]        Inpatient consult to Acute Care Surgery  Consult performed by: Alisson Vivar  Consult ordered by: Jennifer Paul          ASSESSMENT/PLAN:    Duodenitis with focal intramural gas   69 yo male with multiple medical issues with constipation and found to have duodenitis with small amount of intramural air  He does have leukocytosis but his exam is relatively benign at this time  No surgical intervention at this time  We will follow with you  · Clears diet ok from our standpoint  · IVF  · Pain control  · PPI  · Serial exam  · Follow CBC    Reason for Consult / Principal Problem: duodenitis     HPI: Nile Castellano is a 68y o  year old male with history of COPD, PAD, HTN, DM and CKD who presented to the ED today complaining of constipation x1 week  He had called his PCP a few days ago and was told to take over the counter laxative which he did without results  For the past 2 weeks he has also waxing and waning epigastric discomfort which he rates 4/10  He denies N/V/F/C  He does feel slightly bloated and is passing gas  Pertinent cat scan findings shows nonspecific moderate proximal duodenitis with focal subtotal transmural ulceration  No abscess or free intraperitoneal gas  Review of Systems   Constitutional: Positive for unexpected weight change  Negative for activity change, appetite change, chills and fever  Gastrointestinal: Positive for abdominal distention and abdominal pain (epigastric)         Historical Information   Past Medical History:   Diagnosis Date    [facility] )     [facility] ([facility] )     History of heart attack     Hyperlipidemia     Hypertension      Past Surgical History:   Procedure Laterality Date    APPENDECTOMY      BACK SURGERY       Social History   History   Alcohol Use No     History   Drug Use No History   Smoking Status    Current Every Day Smoker    Packs/day: 0 50   Smokeless Tobacco    Never Used     History reviewed  No pertinent family history  Meds/Allergies     Prior to Admission Medications   Prescriptions Last Dose Informant Patient Reported? Taking? Budesonide-Formoterol Fumarate (SYMBICORT IN)     Yes Yes   Sig: Inhale   Linagliptin (TRADJENTA) 5 MG TABS     Yes Yes   Sig: Take 5 mg by mouth daily   SODIUM BICARBONATE PO     Yes Yes   Sig: Take by mouth   amLODIPine (NORVASC) 10 mg tablet     Yes Yes   Sig: Take 10 mg by mouth daily   aspirin (ECOTRIN LOW STRENGTH) 81 mg EC tablet     Yes Yes   Sig: Take 81 mg by mouth daily   chlorthalidone 25 mg tablet     Yes Yes   Sig: Take 25 mg by mouth daily   clopidogrel (PLAVIX) 75 mg tablet     Yes Yes   Sig: Take 75 mg by mouth daily   hydrochlorothiazide (HYDRODIURIL) 25 mg tablet     Yes Yes   Sig: Take 25 mg by mouth daily   lisinopril (ZESTRIL) 40 mg tablet     Yes Yes   Sig: Take 40 mg by mouth daily   metoprolol tartrate (LOPRESSOR) 25 mg tablet     Yes Yes   Sig: Take 25 mg by mouth every 12 (twelve) hours   pravastatin (PRAVACHOL) 40 mg tablet     Yes Yes   Sig: Take 40 mg by mouth daily   repaglinide (PRANDIN) 0 5 mg tablet     Yes Yes   Sig: Take 0 5 mg by mouth 3 (three) times a day before meals   tamsulosin (FLOMAX) 0 4 mg     Yes Yes   Sig: Take 0 4 mg by mouth daily with dinner          Allergies   Allergen Reactions    Contrast [Iodinated Diagnostic Agents]        Objective     Blood pressure (!) 204/93, pulse 72, temperature (!) 97 4 °F (36 3 °C), temperature source Oral, resp  rate 17, weight 64 5 kg (142 lb 3 2 oz), SpO2 96 %    No intake or output data in the 24 hours ending 05/11/18 1511    PHYSICAL EXAM  General appearance: alert and oriented, in no acute distress  Skin: Skin color, texture, turgor normal  No rashes or lesions  Head: Normocephalic, without obvious abnormality  Heart: regular rate and rhythm, S1, S2 normal, no murmur, click, rub or gallop  Lungs: clear to auscultation bilaterally  Abdomen: flat and soft, tender mid epigastric area, no round or guarding, +BS  Back: negative  Rectal: deferred  Neurological: normal without focal findings    Lab Results:   Admission on 05/11/2018   Component Date Value    WBC 05/11/2018 15 77*    RBC 05/11/2018 3 87*    Hemoglobin 05/11/2018 12 9     Hematocrit 05/11/2018 37 9     MCV 05/11/2018 98     MCH 05/11/2018 33 3     MCHC 05/11/2018 34 0     RDW 05/11/2018 12 8     MPV 05/11/2018 10 7     Platelets 33/80/2999 297     Sodium 05/11/2018 134*    Potassium 05/11/2018 4 2     Chloride 05/11/2018 98*    CO2 05/11/2018 24     Anion Gap 05/11/2018 12     BUN 05/11/2018 34*    Creatinine 05/11/2018 1 77*    Glucose 05/11/2018 202*    Calcium 05/11/2018 9 5     AST 05/11/2018 22     ALT 05/11/2018 25     Alkaline Phosphatase 05/11/2018 90     Total Protein 05/11/2018 7 9     Albumin 05/11/2018 3 7     Total Bilirubin 05/11/2018 0 40     eGFR 05/11/2018 36     Lipase 05/11/2018 257     Segmented % 05/11/2018 75     Lymphocytes % 05/11/2018 17     Monocytes % 05/11/2018 6     Eosinophils % 05/11/2018 1     Basophils % 05/11/2018 0     Metamyelocytes% 05/11/2018 1     Absolute Neutrophils 05/11/2018 11 83*    Lymphocytes Absolute 05/11/2018 2 68     Monocytes Absolute 05/11/2018 0 95     Eosinophils Absolute 05/11/2018 0 16     Basophils Absolute 05/11/2018 0 00     Total Counted 05/11/2018 100     Platelet Estimate 91/83/0457 Adequate     Color, UA 05/11/2018 Yellow     Clarity, UA 05/11/2018 Clear     pH, UA 05/11/2018 6 5     Leukocytes, UA 05/11/2018 Negative     Nitrite, UA 05/11/2018 Negative     Protein, UA 05/11/2018 100 (2+)*    Glucose, UA 05/11/2018 Negative     Ketones, UA 05/11/2018 Negative     Urobilinogen, UA 05/11/2018 1 0     Bilirubin, UA 05/11/2018 Negative     Blood, UA 05/11/2018 Negative     Specific Gravity, UA 05/11/2018 1 020     RBC,  05/11/2018 0-1*    WBC,  05/11/2018 0-1*    Epithelial Cells 05/11/2018 Occasional     Bacteria,  05/11/2018 Occasional      Imaging Studies: I have personally reviewed pertinent reports  Counseling / Coordination of Care  Total time spent today  20 minutes  Greater than 50% of total time was spent with the patient and / or family counseling and / or coordination of care

## 2018-05-11 NOTE — ASSESSMENT & PLAN NOTE
· Accu-Cheks q 6 hours while nothing by mouth with sliding scale coverage    Hold patient's prandin and linagliptin

## 2018-05-11 NOTE — ASSESSMENT & PLAN NOTE
· Patient with baseline hypertension, with significant uncontrolled hypertension in the emergency room  Also noted that he has significant renal artery calcifications on his CT scan    · Continue home antihypertensive medications and ongoing follow-up with Nephrology as an outpatient  · P r n  IV hydralazine

## 2018-05-12 LAB
ANION GAP SERPL CALCULATED.3IONS-SCNC: 10 MMOL/L (ref 4–13)
BASOPHILS # BLD MANUAL: 0 THOUSAND/UL (ref 0–0.1)
BASOPHILS NFR MAR MANUAL: 0 % (ref 0–1)
BUN SERPL-MCNC: 30 MG/DL (ref 5–25)
CALCIUM SERPL-MCNC: 9.1 MG/DL (ref 8.3–10.1)
CHLORIDE SERPL-SCNC: 102 MMOL/L (ref 100–108)
CO2 SERPL-SCNC: 25 MMOL/L (ref 21–32)
CREAT SERPL-MCNC: 1.61 MG/DL (ref 0.6–1.3)
EOSINOPHIL # BLD MANUAL: 0 THOUSAND/UL (ref 0–0.4)
EOSINOPHIL NFR BLD MANUAL: 0 % (ref 0–6)
ERYTHROCYTE [DISTWIDTH] IN BLOOD BY AUTOMATED COUNT: 12.9 % (ref 11.6–15.1)
GFR SERPL CREATININE-BSD FRML MDRD: 41 ML/MIN/1.73SQ M
GLUCOSE SERPL-MCNC: 141 MG/DL (ref 65–140)
GLUCOSE SERPL-MCNC: 142 MG/DL (ref 65–140)
GLUCOSE SERPL-MCNC: 149 MG/DL (ref 65–140)
GLUCOSE SERPL-MCNC: 194 MG/DL (ref 65–140)
GLUCOSE SERPL-MCNC: 269 MG/DL (ref 65–140)
GLUCOSE SERPL-MCNC: 68 MG/DL (ref 65–140)
GLUCOSE SERPL-MCNC: 91 MG/DL (ref 65–140)
HCT VFR BLD AUTO: 37.4 % (ref 36.5–49.3)
HGB BLD-MCNC: 12.6 G/DL (ref 12–17)
LYMPHOCYTES # BLD AUTO: 1.8 THOUSAND/UL (ref 0.6–4.47)
LYMPHOCYTES # BLD AUTO: 15 % (ref 14–44)
MCH RBC QN AUTO: 33.4 PG (ref 26.8–34.3)
MCHC RBC AUTO-ENTMCNC: 33.7 G/DL (ref 31.4–37.4)
MCV RBC AUTO: 99 FL (ref 82–98)
MONOCYTES # BLD AUTO: 1.32 THOUSAND/UL (ref 0–1.22)
MONOCYTES NFR BLD: 11 % (ref 4–12)
NEUTROPHILS # BLD MANUAL: 8.9 THOUSAND/UL (ref 1.85–7.62)
NEUTS BAND NFR BLD MANUAL: 1 % (ref 0–8)
NEUTS SEG NFR BLD AUTO: 73 % (ref 43–75)
NRBC BLD AUTO-RTO: 2 /100 WBC (ref 0–2)
PLATELET # BLD AUTO: 288 THOUSANDS/UL (ref 149–390)
PLATELET BLD QL SMEAR: ADEQUATE
PMV BLD AUTO: 11.2 FL (ref 8.9–12.7)
POTASSIUM SERPL-SCNC: 4 MMOL/L (ref 3.5–5.3)
RBC # BLD AUTO: 3.77 MILLION/UL (ref 3.88–5.62)
SODIUM SERPL-SCNC: 137 MMOL/L (ref 136–145)
TOTAL CELLS COUNTED SPEC: 100
WBC # BLD AUTO: 12.03 THOUSAND/UL (ref 4.31–10.16)

## 2018-05-12 PROCEDURE — 87338 HPYLORI STOOL AG IA: CPT | Performed by: INTERNAL MEDICINE

## 2018-05-12 PROCEDURE — 99232 SBSQ HOSP IP/OBS MODERATE 35: CPT | Performed by: PHYSICIAN ASSISTANT

## 2018-05-12 PROCEDURE — 80048 BASIC METABOLIC PNL TOTAL CA: CPT | Performed by: PHYSICIAN ASSISTANT

## 2018-05-12 PROCEDURE — 82948 REAGENT STRIP/BLOOD GLUCOSE: CPT

## 2018-05-12 PROCEDURE — C9113 INJ PANTOPRAZOLE SODIUM, VIA: HCPCS | Performed by: PHYSICIAN ASSISTANT

## 2018-05-12 PROCEDURE — 85027 COMPLETE CBC AUTOMATED: CPT | Performed by: PHYSICIAN ASSISTANT

## 2018-05-12 PROCEDURE — 85007 BL SMEAR W/DIFF WBC COUNT: CPT | Performed by: PHYSICIAN ASSISTANT

## 2018-05-12 RX ORDER — PANTOPRAZOLE SODIUM 40 MG/1
40 TABLET, DELAYED RELEASE ORAL
Status: DISCONTINUED | OUTPATIENT
Start: 2018-05-12 | End: 2018-05-13 | Stop reason: HOSPADM

## 2018-05-12 RX ADMIN — PANTOPRAZOLE SODIUM 40 MG: 40 INJECTION, POWDER, FOR SOLUTION INTRAVENOUS at 08:44

## 2018-05-12 RX ADMIN — METOPROLOL TARTRATE 25 MG: 25 TABLET ORAL at 08:52

## 2018-05-12 RX ADMIN — NICOTINE 1 PATCH: 21 PATCH, EXTENDED RELEASE TRANSDERMAL at 08:46

## 2018-05-12 RX ADMIN — PRAVASTATIN SODIUM 40 MG: 40 TABLET ORAL at 16:01

## 2018-05-12 RX ADMIN — BUDESONIDE AND FORMOTEROL FUMARATE DIHYDRATE 2 PUFF: 160; 4.5 AEROSOL RESPIRATORY (INHALATION) at 18:02

## 2018-05-12 RX ADMIN — TAMSULOSIN HYDROCHLORIDE 0.4 MG: 0.4 CAPSULE ORAL at 16:01

## 2018-05-12 RX ADMIN — LISINOPRIL 40 MG: 10 TABLET ORAL at 08:44

## 2018-05-12 RX ADMIN — PANTOPRAZOLE SODIUM 40 MG: 40 TABLET, DELAYED RELEASE ORAL at 16:01

## 2018-05-12 RX ADMIN — SODIUM CHLORIDE 75 ML/HR: 0.9 INJECTION, SOLUTION INTRAVENOUS at 07:19

## 2018-05-12 RX ADMIN — AMLODIPINE BESYLATE 10 MG: 5 TABLET ORAL at 08:44

## 2018-05-12 RX ADMIN — INSULIN LISPRO 2 UNITS: 100 INJECTION, SOLUTION INTRAVENOUS; SUBCUTANEOUS at 11:35

## 2018-05-12 RX ADMIN — SODIUM CHLORIDE 75 ML/HR: 0.9 INJECTION, SOLUTION INTRAVENOUS at 20:44

## 2018-05-12 RX ADMIN — BUDESONIDE AND FORMOTEROL FUMARATE DIHYDRATE 2 PUFF: 160; 4.5 AEROSOL RESPIRATORY (INHALATION) at 08:47

## 2018-05-12 NOTE — PROGRESS NOTES
[address] [facility]  Progress Note - Justin Gil 1940, 68 y o  male MRN: [id number]    Unit/Bed#: -01 Encounter: [id number]    Primary Care Provider: Aaron Juarez MD   Date and time admitted to hospital: 5/11/2018 10:02 AM    Addendum: Spoke with GI  No indication for endoscopy in house at this time  Recommended starting BID PPI  Outpatient follow up with GI     * Duodenitis   Assessment & Plan    · Patient presented with 6 days of constipation and mild abdominal pain  CT scan of the abdomen showed concern for duodenitis with transmural ulcer  Patient pain free today and tolerating clear liquid diet  · Appreciate involvement from Gastroenterology and surgery  · Surgery sees no need for surgical intervention and is recommending GI perform endoscopy   · Will discuss with GI to see if scope needs to be done IP or should patient's diet be advanced in planning for possible D/C tomorrow           DM2 (diabetes mellitus, type 2) ([facility] )   Assessment & Plan    · Patient is now on clear liquid diet   · Will place patient on SSI algorithm with meals and bedtime   · QID accuchecks         COPD (chronic obstructive pulmonary disease) ([facility] )   Assessment & Plan    · COPD without exacerbation with ongoing tobacco abuse  · Recommend respiratory protocol with DuoNeb and continue Symbicort  · Nicotine patch        Stage 3 chronic kidney disease   Assessment & Plan    · Patient follows with [facility] group, he is currently within his baseline creatinine which seems to be up to 2 0  Creatinine 1 6 today   · Continue to monitor        Hypertension   Assessment & Plan    · Patient with baseline hypertension, with significant uncontrolled hypertension in the emergency room  Also noted that he has significant renal artery calcifications on his CT scan   SBP slightly elevated, but non-emergent  · Continue home antihypertensive medications and ongoing follow-up with Nephrology as an outpatient  · P r n  IV hydralazine        PAD (peripheral artery disease) ([facility] )   Assessment & Plan    · Patient with significant diffuse peripheral arterial disease and calcifications with previous stents placed          VTE Pharmacologic Prophylaxis:   Pharmacologic: Pharmacologic VTE Prophylaxis contraindicated due to duodenitis  Mechanical VTE Prophylaxis in Place: No    Patient Centered Rounds: I have performed bedside rounds with nursing staff today  Discussions with Specialists or Other Care Team Provider: Discussed with RN, CM, GI     Education and Discussions with Family / Patient: Discussed with patient, declined family update     Time Spent for Care: 30 minutes  More than 50% of total time spent on counseling and coordination of care as described above  Current Length of Stay: 1 day(s)    Current Patient Status: Inpatient   Certification Statement: The patient will continue to require additional inpatient hospital stay due to pending further GI work up or making sure patient tolerates diet     Discharge Plan: Possibly tomorrow if no need for EGD    Code Status: Level 1 - Full Code      Subjective:   Patient reports that his pain is much improved  Denies problems tolerating clear liquid diet  Denies nausea, vomiting or diarrhea  Denies chest pain or difficulty breathing  Objective:     Vitals:   Temp (24hrs), Av 3 °F (36 8 °C), Min:98 2 °F (36 8 °C), Max:98 3 °F (36 8 °C)    HR:  [] 75  Resp:  [17-20] 18  BP: (133-204)/(58-93) 160/58  SpO2:  [95 %-98 %] 95 %  Body mass index is 26 01 kg/m²  Input and Output Summary (last 24 hours): Intake/Output Summary (Last 24 hours) at 18 1323  Last data filed at 18 0915   Gross per 24 hour   Intake             1110 ml   Output              450 ml   Net              660 ml       Physical Exam:     Physical Exam   Constitutional: He is oriented to person, place, and time   Vital signs are normal  He appears well-developed and well-nourished  Non-toxic appearance  No distress  HENT:   Head: Normocephalic and atraumatic  Eyes: Conjunctivae and EOM are normal  Pupils are equal, round, and reactive to light  Neck: Neck supple  Cardiovascular: Normal rate, regular rhythm, S1 normal, S2 normal, normal heart sounds and intact distal pulses  Exam reveals no S3 and no S4  No murmur heard  Pulmonary/Chest: Effort normal and breath sounds normal  No accessory muscle usage  No respiratory distress  He has no decreased breath sounds  He has no wheezes  He has no rhonchi  He has no rales  He exhibits no tenderness  Abdominal: Soft  Bowel sounds are normal  He exhibits no distension and no mass  There is no tenderness  There is no rigidity, no rebound and no guarding  Neurological: He is alert and oriented to person, place, and time  He is not disoriented  GCS eye subscore is 4  GCS verbal subscore is 5  GCS motor subscore is 6  Skin: Skin is warm and dry  Additional Data:     Labs:      Results from last 7 days  Lab Units 05/12/18  0516   WBC Thousand/uL 12 03*   HEMOGLOBIN g/dL 12 6   HEMATOCRIT % 37 4   PLATELETS Thousands/uL 288   LYMPHO PCT % 15   MONO PCT MAN % 11   EOSINO PCT MANUAL % 0       Results from last 7 days  Lab Units 05/12/18  0516 05/11/18  1134   SODIUM mmol/L 137 134*   POTASSIUM mmol/L 4 0 4 2   CHLORIDE mmol/L 102 98*   CO2 mmol/L 25 24   BUN mg/dL 30* 34*   CREATININE mg/dL 1 61* 1 77*   CALCIUM mg/dL 9 1 9 5   TOTAL PROTEIN g/dL  --  7 9   BILIRUBIN TOTAL mg/dL  --  0 40   ALK PHOS U/L  --  90   ALT U/L  --  25   AST U/L  --  22   GLUCOSE RANDOM mg/dL 149* 202*           * I Have Reviewed All Lab Data Listed Above  * Additional Pertinent Lab Tests Reviewed:  [address] Admission Reviewed    Imaging:    Imaging Reports Reviewed Today Include: None  Imaging Personally Reviewed by Myself Includes:  None    Recent Cultures (last 7 days):           Last 24 Hours Medication List: Current Facility-Administered Medications:  amLODIPine 10 mg Oral Daily Karley Maxwell PA-C    budesonide-formoterol 2 puff Inhalation BID Karley Maxwell PA-C    chlorthalidone 25 mg Oral Daily Karley Maxwell PA-C    hydrALAZINE 10 mg Intravenous Q6H PRN Karley Maxwell PA-C    hydrochlorothiazide 25 mg Oral Daily Karley Maxwell PA-C    HYDROmorphone 0 5 mg Intravenous Q4H PRN Karley Maxwell PA-C    insulin lispro 1-5 Units Subcutaneous TID AC Karley Maxwell PA-C    lisinopril 40 mg Oral Daily Karley Maxwell PA-C    metoprolol tartrate 25 mg Oral Q12H [facility] Karley Maxwell PA-C    nicotine 1 patch Transdermal Daily Karley Maxwell PA-C    ondansetron 4 mg Intravenous Q4H PRN Karley Maxwell PA-C    pantoprazole 40 mg Intravenous Q12H [facility] Karley Maxwell PA-C    pravastatin 40 mg Oral Daily With [employer], PA-C    sodium chloride 75 mL/hr Intravenous Continuous Karley Maxwell PA-C Last Rate: 75 mL/hr (05/12/18 0719)   tamsulosin 0 4 mg Oral Daily With [employer], PA-C         Today, Patient Was Seen By: Kymberly Alexander PA-C    ** Please Note: Dictation voice to text software may have been used in the creation of this document   **

## 2018-05-12 NOTE — RESPIRATORY THERAPY NOTE
RT Protocol Note  Philomena Cogan 68 y o  male MRN: [id number]  Unit/Bed#: -01 Encounter: [id number]    Assessment    Principal Problem:    Duodenitis  Active Problems:    Stage 3 chronic kidney disease    DM2 (diabetes mellitus, type 2) ([facility]    PAD (peripheral artery disease) ([facility])    COPD (chronic obstructive pulmonary disease) ([facility])    Hypertension      Home Pulmonary Medications: Inhaler       Past Medical History:   Diagnosis Date    BPH (benign prostatic hyperplasia)     CKD (chronic kidney disease)     COPD (chronic obstructive pulmonary disease) ([place] [address] )     Coronary artery disease     Diabetes mellitus ([place] [address] )     History of heart attack     Hyperlipidemia     Hypertension     PAD (peripheral artery disease) ([place] [address] )      Social History     Social History    Marital status: Unknown     Spouse name: N/A    Number of children: N/A    Years of education: N/A     Social History Main Topics    Smoking status: Current Every Day Smoker     Packs/day: 0 50     Years: 60 00    Smokeless tobacco: Never Used    Alcohol use No    Drug use: No    Sexual activity: Not Asked     Other Topics Concern    None     Social History Narrative    None       Subjective         Objective    Physical Exam:   Assessment Type: Pre-treatment  General Appearance: Awake, Alert  Respiratory Pattern: Normal  Chest Assessment: Chest expansion symmetrical  Bilateral Breath Sounds: Diminished, Coarse    Vitals:  Blood pressure 133/68, pulse 64, temperature 98 3 °F (36 8 °C), temperature source Oral, resp  rate 18, height 5' 2" (1 575 m), weight 64 5 kg (142 lb 3 2 oz), SpO2 96 %  Imaging and other studies: I have personally reviewed pertinent reports  Plan    Respiratory Plan: Discontinue Protocol        Resp Comments: Assessed pt per respiratory protcol, SPO2 on RA is 98%, bilateral BS are slightly coarse and diminished  Pt states his breathing feels fine   He informed me that he has a history of COPD and uses an inhaler daily to manage it  He does not use nebulizer treatments at home  He also strongly ststes he doesnt like the nebulizer treatements and he feels they are not making a difference as his breathing is fine  Pt was unable to complete nebulizer treatment that was ordered "he feels like hes going to throw up"  I will discontinue protocol at this time

## 2018-05-12 NOTE — ASSESSMENT & PLAN NOTE
· Patient is now on clear liquid diet   · Will place patient on SSI algorithm with meals and bedtime   · QID accuchecks

## 2018-05-12 NOTE — ASSESSMENT & PLAN NOTE
· Patient presented with 6 days of constipation and mild abdominal pain  CT scan of the abdomen showed concern for duodenitis with transmural ulcer   Patient pain free today and tolerating clear liquid diet  · Appreciate involvement from Gastroenterology and surgery  · Surgery sees no need for surgical intervention and is recommending GI perform endoscopy   · Will discuss with GI to see if scope needs to be done IP or should patient's diet be advanced in planning for possible D/C tomorrow

## 2018-05-12 NOTE — PROGRESS NOTES
Progress Note -Surgery PA  Tyesha Cr 68 y o  male MRN: [id number]  Unit/Bed#: -01 Encounter: [id number]    ASSESSMENT/PLAN:  Problem List     * (Principal)Duodenitis    Stage 3 chronic kidney disease    DM2 (diabetes mellitus, type 2) (HCC)    PAD (peripheral artery disease) (H[facility])    COPD (chronic obstructive pulmonary disease) ([facility] [address] )    Hypertension   67 yo M admitted with constipation and duodenitis is feeling much better but now having diarrhea  No abdominal pain  WBC trending down  No surgical intervention at this time  · Continue Protonix  · IVF  · Follow up on CBC  · Treatment per GI and primary team  · Follow up C dif and H pylori     VTE Pharmacologic Prophylaxis: Sequential compression device (Venodyne)     Subjective/Objective     Subjective: Feels much better today  No pain  Denies N/V  +diarrhea    Objective/Physical Exam: Blood pressure 120/60, pulse 73, temperature 98 6 °F (37 °C), temperature source Oral, resp  rate 15, height 5' 2" (1 575 m), weight 64 5 kg (142 lb 3 2 oz), SpO2 97 %  ,Body mass index is 26 01 kg/m²      General appearance: alert and oriented, in no acute distress  Heart: regular rate and rhythm, S1, S2 normal, no murmur, click, rub or gallop  Lungs: clear to auscultation bilaterally  Abdomen: soft, non-tender; bowel sounds normal; no masses,  no organomegaly        Intake/Output Summary (Last 24 hours) at 05/13/18 0851  Last data filed at 05/12/18 2310   Gross per 24 hour   Intake          1486 25 ml   Output              600 ml   Net           886 25 ml          Lab, Imaging and other studies:      Ref Range & Units 5/13/18 0515 5/12/18 0516    WBC 4 31 - 10 16 Thousand/uL 11 45   12 03      RBC 3 88 - 5 62 Million/uL 3 39   3 77      Hemoglobin 12 0 - 17 0 g/dL 11 2   12 6     Hematocrit 36 5 - 49 3 % 33 8   37 4     MCV 82 - 98 fL 100   99      MCH 26 8 - 34 3 pg 33 0  33 4     MCHC 31 4 - 37 4 g/dL 33 1  33 7     RDW 11 6 - 15 1 % 13 0  12 9     Platelets 391 - 725 Thousands/uL 249  288     MPV 8 9 - 12 7 fL 11 1  11 2

## 2018-05-12 NOTE — ASSESSMENT & PLAN NOTE
· Patient follows with [facility] group, he is currently within his baseline creatinine which seems to be up to 2 0   Creatinine 1 6 today   · Continue to monitor

## 2018-05-12 NOTE — ASSESSMENT & PLAN NOTE
· Patient with baseline hypertension, with significant uncontrolled hypertension in the emergency room  Also noted that he has significant renal artery calcifications on his CT scan   SBP slightly elevated, but non-emergent  · Continue home antihypertensive medications and ongoing follow-up with Nephrology as an outpatient  · P r n  IV hydralazine

## 2018-05-12 NOTE — PROGRESS NOTES
Patient states no upper abdominal pain this morning  He is quite comfortable  States bowel movement after suppository  Denies history of peptic ulcer disease  vital stable  Abdomen flat soft and nontender  Impression:  Duodenitis with possible ulceration  Upon my review of the CT scan consider duodenal diverticulum with inflammation  Constipation  A history of peripheral vascular disease  He has had multiple stents placed in the past   Last 1 was several years ago  Plan:  Continue b i d  Protonix  Clear liquids okay  Will hold Plavix for now

## 2018-05-12 NOTE — CASE MANAGEMENT
Initial Clinical Review    Admission: Date/Time/Statement: 5/11/18 @ 1255     Orders Placed This Encounter   Procedures    Inpatient Admission (expected length of stay for this patient is greater than two midnights)     Standing Status:   Standing     Number of Occurrences:   1     Order Specific Question:   Admitting Physician     Answer:   Paresh Pelayo     Order Specific Question:   Level of Care     Answer:   Med Surg [16]     Order Specific Question:   Estimated length of stay     Answer:   More than 2 Midnights     Order Specific Question:   Certification     Answer:   I certify that inpatient services are medically necessary for this patient for a duration of greater than two midnights  See H&P and MD Progress Notes for additional information about the patient's course of treatment  Pt presented to ED c/o no BM x 6 days, abdominal pain  CT abdomen revealed moderate proximal duodenitis with focal subtotal transmural ulceration  Surgery and Gastroenterology consulted  + leukocytosis  Pt continues on IV fluids  ED: Date/Time/Mode of Arrival:   ED Arrival Information     Expected Arrival Acuity Means of Arrival Escorted By Service Admission Type    - 5/11/2018 09:20 Urgent Walk-In Self General Medicine Urgent    Arrival Complaint    Unable to move Bowels          Chief Complaint:   Chief Complaint   Patient presents with    Constipation     Pt presents to the ED with generalized abd pain/bloating  Reprots lack of bowel movement x 6 days  Denies prior hx of obstruction or abd surgical hx  Reports nausea no vomiting  OTC home laxatives uneffective       History of Illness: Wander Barker is a 68 y o  male who presents with 6 days of inability to move his bowels  He felt this was quite atypical for him as he usually has regular bowel movement     His appetite had dropped off a bit although he usually does have a good appetite overall    He tried a laxative at home and when that did not work he felt he should come in to get the issue taking care of  He does admit to some central/epigastric pain  ED Vital Signs:   ED Triage Vitals [05/11/18 0926]   Temperature Pulse Respirations Blood Pressure SpO2   (!) 97 4 °F (36 3 °C) 96 16 170/77 95 %      Temp Source Heart Rate Source Patient Position - Orthostatic VS BP Location FiO2 (%)   Oral Monitor Sitting Left arm --      Pain Score       4        Wt Readings from Last 1 Encounters:   05/11/18 64 5 kg (142 lb 3 2 oz)       Vital Signs (abnormal):   Date/Time  Temp  Pulse  Resp  BP  MAP (mmHg)  SpO2     05/12/18 0824  98 3 °F (36 8 °C)  75  18  160/58  --  95 %     05/11/18 2201  98 3 °F (36 8 °C)  64  18  133/68  --  96 %     05/11/18 1816  98 2 °F (36 8 °C)  100  20  137/71  --  96 %     05/11/18 1800  --  86  18  164/70  --  98 %     05/11/18 1600  --  88  18   179/72  --  96 %     05/11/18 1500  --  74  18   175/77  --  95 %     05/11/18 1400  --  72  17   204/93  --  96 %     05/11/18 1300  --  76  17   184/85  --  96 %           Abnormal Labs/Diagnostic Test Results  Lab Units 05/11/18  1134   WBC Thousand/uL 15 77*   HEMOGLOBIN g/dL 12 9   HEMATOCRIT % 37 9   PLATELETS Thousands/uL 297     Lab Units 05/11/18  1134   SODIUM mmol/L 134*   POTASSIUM mmol/L 4 2   CHLORIDE mmol/L 98*   CO2 mmol/L 24   BUN mg/dL 34*   CREATININE mg/dL 1 77*   CALCIUM mg/dL 9 5   TOTAL PROTEIN g/dL 7 9   BILIRUBIN TOTAL mg/dL 0 40   ALK PHOS U/L 90   ALT U/L 25   AST U/L 22   GLUCOSE RANDOM mg/dL 202*      Nonspecific moderate proximal duodenitis with focal subtotal transmural ulceration  No abscess or free intraperitoneal gas        Colonic diverticulosis without acute diverticulitis        2 subcentimeter hypodense nodules in the left kidney likely representing hemorrhagic cyst   This could be followed up with nonemergent renal sonogram        Severe atherosclerotic vascular disease    Severe calcific narrowing at the origin of the superior mesenteric and bilateral renal arteries              ED Treatment:   Medication Administration from 05/11/2018 0920 to 05/11/2018 1815       Date/Time Order Dose Route Action Comments     05/11/2018 1137 bisacodyl (DULCOLAX) rectal suppository 10 mg 10 mg Rectal Given      05/11/2018 1631 sodium chloride 0 9 % infusion 75 mL/hr Intravenous New Bag      05/11/2018 1627 nicotine (NICODERM CQ) 21 mg/24 hr TD 24 hr patch 1 patch 1 patch Transdermal Medication Applied      05/11/2018 1650 ipratropium-albuterol (DUO-NEB) 0 5-2 5 mg/3 mL inhalation solution 3 mL 3 mL Nebulization Given           Past Medical/Surgical History: Active Ambulatory Problems     Diagnosis Date Noted    No Active Ambulatory Problems     Resolved Ambulatory Problems     Diagnosis Date Noted    No Resolved Ambulatory Problems     Past Medical History:   Diagnosis Date    BPH (benign prostatic hyperplasia)     CKD (chronic kidney disease)     [facility]     Coronary artery disease     Diabetes mellitus ([facility] )     History of heart attack     Hyperlipidemia     Hypertension     PAD (peripheral artery disease) ([facility])        Admitting Diagnosis: Ulcer duodenal hemorrhage [K26 4]  Constipation [K59 00]  Duodenitis [K29 80]  Constipation, unspecified constipation type [K59 00]    Age/Sex: 68 y o  male    Assessment/Plan:   * Duodenitis   Assessment & Plan     · Patient presented with 6 days of constipation and mild abdominal pain  · CT scan of the abdomen showed concern for duodenitis with transmural ulcer  · Appreciate involvement from Gastroenterology and surgery  · Will provide IV Protonix and keep the patient nothing by mouth for now with IVF  Will provide pain medication  Defer to surgery whether antibiotics are indicated    Defer to GI as far as when to pursue EGD          Stage 3 chronic kidney disease   Assessment & Plan     · Patient follows with [facility], he is currently within his baseline creatinine which seems to be up to 2 0  · Continue to monitor          Hypertension   Assessment & Plan     · Patient with baseline hypertension, with significant uncontrolled hypertension in the emergency room  Also noted that he has significant renal artery calcifications on his CT scan  · Continue home antihypertensive medications and ongoing follow-up with Nephrology as an outpatient  · P r n  IV hydralazine          DM2 (diabetes mellitus, type 2) ([facility])   Assessment & Plan     · Accu-Cheks q 6 hours while nothing by mouth with sliding scale coverage  Hold patient's prandin and linagliptin          COPD (chronic obstructive pulmonary disease) ([facility])   Assessment & Plan     · COPD without exacerbation with ongoing tobacco abuse  · Recommend respiratory protocol with DuoNeb and continue Symbicort  · Nicotine patch          PAD (peripheral artery disease) ([facility] )   Assessment & Plan     · Patient with significant diffuse peripheral arterial disease and calcifications with previous stents placed             VTE Prophylaxis: Pharmacologic VTE Prophylaxis contraindicated due to possible ulcer  / sequential compression device        Anticipated Length of Stay:  Patient will be admitted on an Inpatient basis with an anticipated length of stay of  Greater than 2 midnights     Justification for Hospital Stay: duodenitis      Admission Orders:  Scheduled Meds:   Current Facility-Administered Medications:  amLODIPine 10 mg Oral Daily Karley Maxwell PA-C    budesonide-formoterol 2 puff Inhalation BID Karley Maxwell PA-C    chlorthalidone 25 mg Oral Daily Karley Maxwell PA-C    hydrALAZINE 10 mg Intravenous Q6H PRN Karley Maxwell PA-C    hydrochlorothiazide 25 mg Oral Daily Karley Maxwell PA-C    HYDROmorphone 0 5 mg Intravenous Q4H PRN Karley Maxwell PA-C    insulin lispro 1-5 Units Subcutaneous TID AC Karley Maxwell PA-C    lisinopril 40 mg Oral Daily Karley Maxwell PA-C    metoprolol tartrate 25 mg Oral Q12H [facility] Karley Maxwell PA-C nicotine 1 patch Transdermal Daily Karley Maxwell PA-C    ondansetron 4 mg Intravenous Q4H PRN Karley Maxwell PA-C    pantoprazole 40 mg Oral BID AC Christopher Castorena PA-C    pravastatin 40 mg Oral Daily With [employer], PA-C    sodium chloride 75 mL/hr Intravenous Continuous Karley Maxwell PA-C Last Rate: 75 mL/hr (05/12/18 0719)   tamsulosin 0 4 mg Oral Daily With Dinner Karley Maxwell PA-C      Continuous Infusions:   sodium chloride 75 mL/hr Last Rate: 75 mL/hr (05/12/18 0719)     PRN Meds: hydrALAZINE    HYDROmorphone    ondansetron    Consult general surgery  Consult gastroenterology  CBC 5/12 WBC 12 03, 12 6/37 4  BMP 5/12 BUN 30, Creat 1 61  CBC 5/13    ------------------------------------------------------------------------------------------------------------------------------  Surgery progress note  ASSESSMENT/PLAN:    Duodenitis with focal intramural gas   67 yo male with multiple medical issues with constipation and found to have duodenitis with small amount of intramural air  He does have leukocytosis but his exam is relatively benign at this time  No surgical intervention at this time  We will follow with you  · Clears diet ok from our standpoint  · IVF  · Pain control  · PPI  · Serial exam  · Follow CBC     Reason for Consult / Principal Problem: duodenitis      HPI: Addy Argueta is a 68y o  year old male with history of COPD, PAD, HTN, DM and CKD who presented to the ED today complaining of constipation x1 week  He had called his PCP a few days ago and was told to take over the counter laxative which he did without results  For the past 2 weeks he has also waxing and waning epigastric discomfort which he rates 4/10  He denies N/V/F/C    He does feel slightly bloated and is passing gas        Pertinent cat scan findings shows nonspecific moderate proximal duodenitis with focal subtotal transmural ulceration   No abscess or free intraperitoneal gas      Review of Systems Constitutional: Positive for unexpected weight change  Negative for activity change, appetite change, chills and fever  Gastrointestinal: Positive for abdominal distention and abdominal pain (epigastric)  Thank you,  [address] [facility] in the [facility] by Mehran Montenegro for 2017  Network Utilization Review Department  Phone: [phone]; Fax [phone]  ATTENTION: The Network Utilization Review Department is now centralized for our 7 Facilities  Make a note that we have a new phone and fax numbers for our Department  Please call with any questions or concerns to [phone] and carefully follow the prompts so that you are directed to the right person  All voicemails are confidential  Fax any determinations, approvals, denials, and requests for initial or continue stay review clinical to [phone]   Due to HIGH CALL volume, it would be easier if you could please send faxed requests to expedite your requests and in part, help us provide discharge notifications faster

## 2018-05-13 VITALS
WEIGHT: 142.2 LBS | HEIGHT: 62 IN | BODY MASS INDEX: 26.17 KG/M2 | DIASTOLIC BLOOD PRESSURE: 60 MMHG | RESPIRATION RATE: 18 BRPM | HEART RATE: 77 BPM | OXYGEN SATURATION: 98 % | SYSTOLIC BLOOD PRESSURE: 120 MMHG | TEMPERATURE: 98.6 F

## 2018-05-13 LAB
C DIFF TOX GENS STL QL NAA+PROBE: NORMAL
ERYTHROCYTE [DISTWIDTH] IN BLOOD BY AUTOMATED COUNT: 13 % (ref 11.6–15.1)
GLUCOSE SERPL-MCNC: 165 MG/DL (ref 65–140)
GLUCOSE SERPL-MCNC: 273 MG/DL (ref 65–140)
HCT VFR BLD AUTO: 33.8 % (ref 36.5–49.3)
HGB BLD-MCNC: 11.2 G/DL (ref 12–17)
MCH RBC QN AUTO: 33 PG (ref 26.8–34.3)
MCHC RBC AUTO-ENTMCNC: 33.1 G/DL (ref 31.4–37.4)
MCV RBC AUTO: 100 FL (ref 82–98)
PLATELET # BLD AUTO: 249 THOUSANDS/UL (ref 149–390)
PMV BLD AUTO: 11.1 FL (ref 8.9–12.7)
RBC # BLD AUTO: 3.39 MILLION/UL (ref 3.88–5.62)
WBC # BLD AUTO: 11.45 THOUSAND/UL (ref 4.31–10.16)

## 2018-05-13 PROCEDURE — 94668 MNPJ CHEST WALL SBSQ: CPT

## 2018-05-13 PROCEDURE — 87493 C DIFF AMPLIFIED PROBE: CPT | Performed by: INTERNAL MEDICINE

## 2018-05-13 PROCEDURE — 99239 HOSP IP/OBS DSCHRG MGMT >30: CPT | Performed by: PHYSICIAN ASSISTANT

## 2018-05-13 PROCEDURE — 82948 REAGENT STRIP/BLOOD GLUCOSE: CPT

## 2018-05-13 PROCEDURE — 85027 COMPLETE CBC AUTOMATED: CPT | Performed by: PHYSICIAN ASSISTANT

## 2018-05-13 RX ORDER — PANTOPRAZOLE SODIUM 40 MG/1
40 TABLET, DELAYED RELEASE ORAL
Qty: 60 TABLET | Refills: 0 | Status: SHIPPED | OUTPATIENT
Start: 2018-05-13

## 2018-05-13 RX ADMIN — CHLORTHALIDONE 25 MG: 25 TABLET ORAL at 08:01

## 2018-05-13 RX ADMIN — AMLODIPINE BESYLATE 10 MG: 5 TABLET ORAL at 08:01

## 2018-05-13 RX ADMIN — BUDESONIDE AND FORMOTEROL FUMARATE DIHYDRATE 2 PUFF: 160; 4.5 AEROSOL RESPIRATORY (INHALATION) at 08:02

## 2018-05-13 RX ADMIN — INSULIN LISPRO 2 UNITS: 100 INJECTION, SOLUTION INTRAVENOUS; SUBCUTANEOUS at 12:01

## 2018-05-13 RX ADMIN — LISINOPRIL 40 MG: 10 TABLET ORAL at 08:01

## 2018-05-13 RX ADMIN — HYDROCHLOROTHIAZIDE 25 MG: 25 TABLET ORAL at 08:01

## 2018-05-13 RX ADMIN — PANTOPRAZOLE SODIUM 40 MG: 40 TABLET, DELAYED RELEASE ORAL at 06:04

## 2018-05-13 RX ADMIN — METOPROLOL TARTRATE 25 MG: 25 TABLET ORAL at 08:01

## 2018-05-13 RX ADMIN — NICOTINE 1 PATCH: 21 PATCH, EXTENDED RELEASE TRANSDERMAL at 08:05

## 2018-05-13 RX ADMIN — INSULIN LISPRO 1 UNITS: 100 INJECTION, SOLUTION INTRAVENOUS; SUBCUTANEOUS at 08:04

## 2018-05-13 RX ADMIN — SODIUM CHLORIDE 75 ML/HR: 0.9 INJECTION, SOLUTION INTRAVENOUS at 09:43

## 2018-05-13 NOTE — ASSESSMENT & PLAN NOTE
· Patient presented with 6 days of constipation and mild abdominal pain  CT scan of the abdomen showed concern for duodenitis with transmural ulcer   Patient pain free today and tolerating surgical soft diet   · Appreciate involvement from Gastroenterology and surgery  · Surgery sees no need for surgical intervention and is recommending GI perform endoscopy   · Stable for discharge  · Follow up with GI outpatient as needed  · Protonix 40 mg BID

## 2018-05-13 NOTE — ASSESSMENT & PLAN NOTE
· Patient with baseline hypertension, with significant uncontrolled hypertension in the emergency room  Also noted that he has significant renal artery calcifications on his CT scan   SBP slightly elevated, but non-emergent  · Continue home antihypertensive medications and ongoing follow-up with Nephrology as an outpatient

## 2018-05-13 NOTE — DISCHARGE SUMMARY
[facility]  Discharge- [facility] 1940, 68 y o  male MRN: [id number]    Unit/Bed#: -01 Encounter: [id number]    Primary Care Provider: Taj Thompson MD   Date and time admitted to hospital: 5/11/2018 10:02 AM        * Duodenitis   Assessment & Plan    · Patient presented with 6 days of constipation and mild abdominal pain  CT scan of the abdomen showed concern for duodenitis with transmural ulcer  Patient pain free today and tolerating surgical soft diet   · Appreciate involvement from Gastroenterology and surgery  · Surgery sees no need for surgical intervention and is recommending GI perform endoscopy   · Stable for discharge  · Follow up with GI outpatient as needed  · Protonix 40 mg BID           DM2 (diabetes mellitus, type 2) ([facility] )   Assessment & Plan    · Patient is now on surgical soft diet   · Restart home regimen         COPD (chronic obstructive pulmonary disease) ([facility] )   Assessment & Plan    · COPD without exacerbation with ongoing tobacco abuse  · Recommend respiratory protocol with DuoNeb and continue Symbicort  · Nicotine patch        Stage 3 chronic kidney disease   Assessment & Plan    · Patient follows with [facility] group, he is currently within his baseline creatinine which seems to be up to 2 0  Creatinine 1 6 today   · Continue to monitor        Hypertension   Assessment & Plan    · Patient with baseline hypertension, with significant uncontrolled hypertension in the emergency room  Also noted that he has significant renal artery calcifications on his CT scan   SBP slightly elevated, but non-emergent  · Continue home antihypertensive medications and ongoing follow-up with Nephrology as an outpatient          PAD (peripheral artery disease) ([facility] )   Assessment & Plan    · Patient with significant diffuse peripheral arterial disease and calcifications with previous stents placed          Discharging Physician / Practitioner: Kris Soto JOHANA  PCP: Mauro Johnson MD  Admission Date:   Admission Orders     Ordered        05/11/18 1255  Inpatient Admission (expected length of stay for this patient is greater than two midnights)  Once             Discharge Date: 05/13/18    Resolved Problems  Date Reviewed: 5/13/2018    None          Consultations During Hospital Stay:  · [facility] - Dr Don Recinos   · Gastroenterology - Dr Jemma Juarez, Dr Bhanu Meza     Procedures Performed:     · CT abdomen pelvis without contrast     Significant Findings / Test Results:     · CT Abdomen Pelvis without contrast - Nonspecific moderate proximal duodenitis with focal subtotal transmural ulceration  No abscess or free intraperitoneal gas  Colonic diverticulosis without acute diverticulitis  2 subcentimeter hypodense nodules in the left kidney likely representing hemorrhagic cyst  Severe atherosclerotic vascular disease  Severe calcific narrowing at origin of the superior mesenteric and bilateral renal arteries  Incidental Findings:   · None     Test Results Pending at Discharge (will require follow up): · None     Outpatient Tests Requested:  · None    Complications:  None    Reason for Admission: Abdominal Pain    Hospital Course:     Eula Gaines is a 68 y o  male patient who originally presented to the hospital on 5/11/2018 due to abdominal pain  The patient had a CT scan on admission that was concerning for duodenitis with transmural ulceration  Surgery was initially evaluated to do concern for possible rupture, however there was nothing from their standpoint to surgically repair  GI was consulted, however EGD was contraindicated due to risk of rupture of the ulceration  He was admitted and placed on a clear liquid diet and analgesia was given  His diet was able to be advanced to surgical soft which he tolerated well  The patient did have some diarrhea while hospitalized, however C  Diff testing was negative   He was instructed to follow up with GI as needed and BID PPI was started  Please see above list of diagnoses and related plan for additional information  Condition at Discharge: stable     Discharge Day Visit / Exam:     Subjective:  Patient has no complaints  States that he tolerated diet well  Noted some diarrhea overnight  Denies fevers, chills, nausea, vomiting  Vitals: Blood Pressure: 120/60 (05/13/18 0726)  Pulse: 73 (05/13/18 0726)  Temperature: 98 6 °F (37 °C) (05/13/18 0726)  Temp Source: Oral (05/13/18 0726)  Respirations: 15 (05/13/18 0726)  Height: 5' 2" (157 5 cm) (05/11/18 1816)  Weight - Scale: 64 5 kg (142 lb 3 2 oz) (05/11/18 1816)  SpO2: 97 % (05/13/18 0726)  Exam:   Physical Exam   Constitutional: He is oriented to person, place, and time  Vital signs are normal  He appears well-developed and well-nourished  Non-toxic appearance  No distress  HENT:   Head: Normocephalic and atraumatic  Mouth/Throat: Abnormal dentition  Eyes: Conjunctivae and EOM are normal  Pupils are equal, round, and reactive to light  Neck: Neck supple  Cardiovascular: Normal rate, regular rhythm, S1 normal, S2 normal and intact distal pulses  Exam reveals no S3 and no S4  No murmur heard  Pulmonary/Chest: Effort normal and breath sounds normal  No accessory muscle usage  No respiratory distress  He has no decreased breath sounds  He has no wheezes  He has no rhonchi  He has no rales  He exhibits no tenderness  Abdominal: Soft  Bowel sounds are normal  He exhibits no distension and no mass  There is no tenderness  There is no rigidity, no rebound and no guarding  Neurological: He is alert and oriented to person, place, and time  He is not disoriented  GCS eye subscore is 4  GCS verbal subscore is 5  GCS motor subscore is 6  Skin: Skin is warm and dry  Discussion with Family: Declined update     Discharge instructions/Information to patient and family:   See after visit summary for information provided to patient and family        Provisions for Follow-Up Care:  See after visit summary for information related to follow-up care and any pertinent home health orders  Disposition:     Home    For Discharges to [address] [facility]:   · Not Applicable to this Patient - Not Applicable to this Patient    Planned Readmission: None     Discharge Statement:  I spent 45 minutes discharging the patient  This time was spent on the day of discharge  I had direct contact with the patient on the day of discharge  Greater than 50% of the total time was spent examining patient, answering all patient questions, arranging and discussing plan of care with patient as well as directly providing post-discharge instructions  Additional time then spent on discharge activities  Discharge Medications:  See after visit summary for reconciled discharge medications provided to patient and family        ** Please Note: This note has been constructed using a voice recognition system **

## 2018-05-13 NOTE — ASSESSMENT & PLAN NOTE
· Patient follows with [facility], he is currently within his baseline creatinine which seems to be up to 2 0   Creatinine 1 6 today   · Continue to monitor

## 2018-05-16 LAB — H PYLORI AG STL QL IA: NEGATIVE

## 2018-05-16 NOTE — CASE MANAGEMENT
Notification of Discharge  This is a Notification of Discharge from our facility [address]  Please be advised that this patient has been discharge from our facility  Below you will find the admission and discharge date and time including the patients disposition  PRESENTATION DATE: 5/11/2018 10:02 AM  IP ADMISSION DATE: 5/11/18 1255  DISCHARGE DATE: 5/13/2018 12:32 PM  DISPOSITION: [address] [facility] in the [employer] by Mehran Montenegro for 2017  Network Utilization Review Department  Phone: [phone]; Fax [phone]  ATTENTION: The Network Utilization Review Department is now centralized for our 7 Facilities  Make a note that we have a new phone and fax numbers for our Department  Please call with any questions or concerns to [phone] and carefully follow the prompts so that you are directed to the right person  All voicemails are confidential  Fax any determinations, approvals, denials, and requests for initial or continue stay review clinical to [phone]  Due to HIGH CALL volume, it would be easier if you could please send faxed requests to expedite your requests and in part, help us provide discharge notifications faster        Reference #[id number]

## 2018-12-09 ENCOUNTER — APPOINTMENT (EMERGENCY)
Dept: RADIOLOGY | Facility: HOSPITAL | Age: 78
DRG: 377 | End: 2018-12-09
Payer: COMMERCIAL

## 2018-12-09 ENCOUNTER — ANESTHESIA EVENT (INPATIENT)
Dept: GASTROENTEROLOGY | Facility: HOSPITAL | Age: 78
DRG: 377 | End: 2018-12-09
Payer: COMMERCIAL

## 2018-12-09 ENCOUNTER — HOSPITAL ENCOUNTER (INPATIENT)
Facility: HOSPITAL | Age: 78
LOS: 7 days | Discharge: HOME HEALTH | DRG: 377 | End: 2018-12-16
Attending: EMERGENCY MEDICINE | Admitting: INTERNAL MEDICINE
Payer: COMMERCIAL

## 2018-12-09 ENCOUNTER — APPOINTMENT (EMERGENCY)
Dept: CT IMAGING | Facility: HOSPITAL | Age: 78
DRG: 377 | End: 2018-12-09
Payer: COMMERCIAL

## 2018-12-09 DIAGNOSIS — R09.02 HYPOXIA: ICD-10-CM

## 2018-12-09 DIAGNOSIS — K92.1 MELENA: ICD-10-CM

## 2018-12-09 DIAGNOSIS — R65.10 SIRS (SYSTEMIC INFLAMMATORY RESPONSE SYNDROME) (HCC): ICD-10-CM

## 2018-12-09 DIAGNOSIS — K92.2 GI BLEED: Primary | ICD-10-CM

## 2018-12-09 DIAGNOSIS — I20.8 STABLE ANGINA PECTORIS (HCC): ICD-10-CM

## 2018-12-09 DIAGNOSIS — E11.9 TYPE 2 DIABETES MELLITUS WITHOUT COMPLICATION, WITHOUT LONG-TERM CURRENT USE OF INSULIN (HCC): ICD-10-CM

## 2018-12-09 DIAGNOSIS — I20.0 UNSTABLE ANGINA (HCC): ICD-10-CM

## 2018-12-09 DIAGNOSIS — N17.9 ACUTE KIDNEY INJURY (HCC): ICD-10-CM

## 2018-12-09 DIAGNOSIS — I10 ESSENTIAL HYPERTENSION: ICD-10-CM

## 2018-12-09 DIAGNOSIS — J44.9 CHRONIC OBSTRUCTIVE PULMONARY DISEASE, UNSPECIFIED COPD TYPE (HCC): ICD-10-CM

## 2018-12-09 DIAGNOSIS — J98.11 ATELECTASIS OF RIGHT LUNG: ICD-10-CM

## 2018-12-09 PROBLEM — D72.829 LEUKOCYTOSIS: Status: ACTIVE | Noted: 2018-12-09

## 2018-12-09 PROBLEM — I73.9 PAD (PERIPHERAL ARTERY DISEASE) (HCC): Status: RESOLVED | Noted: 2018-05-11 | Resolved: 2018-12-09

## 2018-12-09 PROBLEM — R74.8 ELEVATED LIPASE: Status: ACTIVE | Noted: 2018-12-09

## 2018-12-09 PROBLEM — I25.10 CAD (CORONARY ARTERY DISEASE): Status: ACTIVE | Noted: 2018-12-09

## 2018-12-09 PROBLEM — E87.2 LACTIC ACIDOSIS: Status: ACTIVE | Noted: 2018-12-09

## 2018-12-09 PROBLEM — I20.9 ISCHEMIC CHEST PAIN (HCC): Status: ACTIVE | Noted: 2018-12-09

## 2018-12-09 LAB
ABO GROUP BLD: NORMAL
ALBUMIN SERPL BCP-MCNC: 3.2 G/DL (ref 3.5–5)
ALP SERPL-CCNC: 55 U/L (ref 46–116)
ALT SERPL W P-5'-P-CCNC: 26 U/L (ref 12–78)
ANION GAP SERPL CALCULATED.3IONS-SCNC: 13 MMOL/L (ref 4–13)
APTT PPP: 24 SECONDS (ref 26–38)
AST SERPL W P-5'-P-CCNC: 19 U/L (ref 5–45)
ATRIAL RATE: 357 BPM
ATRIAL RATE: 81 BPM
ATRIAL RATE: 82 BPM
BASOPHILS # BLD AUTO: 0.08 THOUSANDS/ΜL (ref 0–0.1)
BASOPHILS NFR BLD AUTO: 1 % (ref 0–1)
BILIRUB SERPL-MCNC: 0.4 MG/DL (ref 0.2–1)
BILIRUB UR QL STRIP: NEGATIVE
BLD GP AB SCN SERPL QL: NEGATIVE
BUN SERPL-MCNC: 68 MG/DL (ref 5–25)
CALCIUM SERPL-MCNC: 8.7 MG/DL (ref 8.3–10.1)
CHLORIDE SERPL-SCNC: 102 MMOL/L (ref 100–108)
CLARITY UR: CLEAR
CO2 SERPL-SCNC: 22 MMOL/L (ref 21–32)
COLOR UR: ABNORMAL
CREAT SERPL-MCNC: 2.08 MG/DL (ref 0.6–1.3)
EOSINOPHIL # BLD AUTO: 0.09 THOUSAND/ΜL (ref 0–0.61)
EOSINOPHIL NFR BLD AUTO: 1 % (ref 0–6)
ERYTHROCYTE [DISTWIDTH] IN BLOOD BY AUTOMATED COUNT: 14.2 % (ref 11.6–15.1)
GFR SERPL CREATININE-BSD FRML MDRD: 30 ML/MIN/1.73SQ M
GLUCOSE SERPL-MCNC: 103 MG/DL (ref 65–140)
GLUCOSE SERPL-MCNC: 123 MG/DL (ref 65–140)
GLUCOSE SERPL-MCNC: 203 MG/DL (ref 65–140)
GLUCOSE UR STRIP-MCNC: ABNORMAL MG/DL
HCT VFR BLD AUTO: 25.2 % (ref 36.5–49.3)
HCT VFR BLD AUTO: 29.5 % (ref 36.5–49.3)
HGB BLD-MCNC: 7.2 G/DL (ref 12–17)
HGB BLD-MCNC: 8.4 G/DL (ref 12–17)
HGB BLD-MCNC: 9.8 G/DL (ref 12–17)
HGB UR QL STRIP.AUTO: NEGATIVE
IMM GRANULOCYTES # BLD AUTO: 0.33 THOUSAND/UL (ref 0–0.2)
IMM GRANULOCYTES NFR BLD AUTO: 2 % (ref 0–2)
INR PPP: 1.05 (ref 0.86–1.17)
KETONES UR STRIP-MCNC: NEGATIVE MG/DL
LACTATE SERPL-SCNC: 0.9 MMOL/L (ref 0.5–2)
LACTATE SERPL-SCNC: 3.1 MMOL/L (ref 0.5–2)
LEUKOCYTE ESTERASE UR QL STRIP: NEGATIVE
LIPASE SERPL-CCNC: 422 U/L (ref 73–393)
LYMPHOCYTES # BLD AUTO: 3.33 THOUSANDS/ΜL (ref 0.6–4.47)
LYMPHOCYTES NFR BLD AUTO: 21 % (ref 14–44)
MCH RBC QN AUTO: 33.8 PG (ref 26.8–34.3)
MCHC RBC AUTO-ENTMCNC: 33.2 G/DL (ref 31.4–37.4)
MCV RBC AUTO: 102 FL (ref 82–98)
MONOCYTES # BLD AUTO: 1.22 THOUSAND/ΜL (ref 0.17–1.22)
MONOCYTES NFR BLD AUTO: 8 % (ref 4–12)
NEUTROPHILS # BLD AUTO: 11.09 THOUSANDS/ΜL (ref 1.85–7.62)
NEUTS SEG NFR BLD AUTO: 67 % (ref 43–75)
NITRITE UR QL STRIP: NEGATIVE
NRBC BLD AUTO-RTO: 0 /100 WBCS
P AXIS: 62 DEGREES
P AXIS: 97 DEGREES
PH UR STRIP.AUTO: 6.5 [PH] (ref 4.5–8)
PLATELET # BLD AUTO: 265 THOUSANDS/UL (ref 149–390)
PMV BLD AUTO: 11.9 FL (ref 8.9–12.7)
POTASSIUM SERPL-SCNC: 4.2 MMOL/L (ref 3.5–5.3)
PR INTERVAL: 236 MS
PR INTERVAL: 268 MS
PROCALCITONIN SERPL-MCNC: 0.06 NG/ML
PROT SERPL-MCNC: 6.4 G/DL (ref 6.4–8.2)
PROT UR STRIP-MCNC: NEGATIVE MG/DL
PROTHROMBIN TIME: 13.4 SECONDS (ref 11.8–14.2)
QRS AXIS: -21 DEGREES
QRS AXIS: -51 DEGREES
QRS AXIS: -57 DEGREES
QRSD INTERVAL: 68 MS
QRSD INTERVAL: 76 MS
QRSD INTERVAL: 84 MS
QT INTERVAL: 352 MS
QT INTERVAL: 352 MS
QT INTERVAL: 504 MS
QTC INTERVAL: 390 MS
QTC INTERVAL: 408 MS
QTC INTERVAL: 588 MS
RBC # BLD AUTO: 2.9 MILLION/UL (ref 3.88–5.62)
RH BLD: POSITIVE
SODIUM SERPL-SCNC: 137 MMOL/L (ref 136–145)
SP GR UR STRIP.AUTO: 1.01 (ref 1–1.03)
SPECIMEN EXPIRATION DATE: NORMAL
T WAVE AXIS: 101 DEGREES
T WAVE AXIS: 184 DEGREES
T WAVE AXIS: 75 DEGREES
TROPONIN I SERPL-MCNC: 0.03 NG/ML
TROPONIN I SERPL-MCNC: 0.11 NG/ML
TROPONIN I SERPL-MCNC: 0.24 NG/ML
UROBILINOGEN UR QL STRIP.AUTO: 0.2 E.U./DL
VENTRICULAR RATE: 74 BPM
VENTRICULAR RATE: 81 BPM
VENTRICULAR RATE: 82 BPM
WBC # BLD AUTO: 16.14 THOUSAND/UL (ref 4.31–10.16)

## 2018-12-09 PROCEDURE — 99222 1ST HOSP IP/OBS MODERATE 55: CPT | Performed by: INTERNAL MEDICINE

## 2018-12-09 PROCEDURE — 71045 X-RAY EXAM CHEST 1 VIEW: CPT

## 2018-12-09 PROCEDURE — 85018 HEMOGLOBIN: CPT | Performed by: PHYSICIAN ASSISTANT

## 2018-12-09 PROCEDURE — 86920 COMPATIBILITY TEST SPIN: CPT

## 2018-12-09 PROCEDURE — 94762 N-INVAS EAR/PLS OXIMTRY CONT: CPT

## 2018-12-09 PROCEDURE — 74176 CT ABD & PELVIS W/O CONTRAST: CPT

## 2018-12-09 PROCEDURE — 87040 BLOOD CULTURE FOR BACTERIA: CPT | Performed by: PHYSICIAN ASSISTANT

## 2018-12-09 PROCEDURE — 99285 EMERGENCY DEPT VISIT HI MDM: CPT

## 2018-12-09 PROCEDURE — C9113 INJ PANTOPRAZOLE SODIUM, VIA: HCPCS | Performed by: PHYSICIAN ASSISTANT

## 2018-12-09 PROCEDURE — 86901 BLOOD TYPING SEROLOGIC RH(D): CPT | Performed by: PHYSICIAN ASSISTANT

## 2018-12-09 PROCEDURE — 82948 REAGENT STRIP/BLOOD GLUCOSE: CPT

## 2018-12-09 PROCEDURE — 30233N1 TRANSFUSION OF NONAUTOLOGOUS RED BLOOD CELLS INTO PERIPHERAL VEIN, PERCUTANEOUS APPROACH: ICD-10-PCS | Performed by: INTERNAL MEDICINE

## 2018-12-09 PROCEDURE — 93010 ELECTROCARDIOGRAM REPORT: CPT | Performed by: INTERNAL MEDICINE

## 2018-12-09 PROCEDURE — 84145 PROCALCITONIN (PCT): CPT | Performed by: PHYSICIAN ASSISTANT

## 2018-12-09 PROCEDURE — 84484 ASSAY OF TROPONIN QUANT: CPT | Performed by: PHYSICIAN ASSISTANT

## 2018-12-09 PROCEDURE — 99223 1ST HOSP IP/OBS HIGH 75: CPT | Performed by: HOSPITALIST

## 2018-12-09 PROCEDURE — 80053 COMPREHEN METABOLIC PANEL: CPT | Performed by: PHYSICIAN ASSISTANT

## 2018-12-09 PROCEDURE — 83605 ASSAY OF LACTIC ACID: CPT | Performed by: PHYSICIAN ASSISTANT

## 2018-12-09 PROCEDURE — 85610 PROTHROMBIN TIME: CPT | Performed by: PHYSICIAN ASSISTANT

## 2018-12-09 PROCEDURE — 96365 THER/PROPH/DIAG IV INF INIT: CPT

## 2018-12-09 PROCEDURE — 86900 BLOOD TYPING SEROLOGIC ABO: CPT | Performed by: PHYSICIAN ASSISTANT

## 2018-12-09 PROCEDURE — 86850 RBC ANTIBODY SCREEN: CPT | Performed by: PHYSICIAN ASSISTANT

## 2018-12-09 PROCEDURE — 36415 COLL VENOUS BLD VENIPUNCTURE: CPT | Performed by: PHYSICIAN ASSISTANT

## 2018-12-09 PROCEDURE — 85018 HEMOGLOBIN: CPT | Performed by: NURSE PRACTITIONER

## 2018-12-09 PROCEDURE — 3E0G8GC INTRODUCTION OF OTHER THERAPEUTIC SUBSTANCE INTO UPPER GI, VIA NATURAL OR ARTIFICIAL OPENING ENDOSCOPIC: ICD-10-PCS | Performed by: INTERNAL MEDICINE

## 2018-12-09 PROCEDURE — 93005 ELECTROCARDIOGRAM TRACING: CPT

## 2018-12-09 PROCEDURE — 96361 HYDRATE IV INFUSION ADD-ON: CPT

## 2018-12-09 PROCEDURE — 85025 COMPLETE CBC W/AUTO DIFF WBC: CPT | Performed by: PHYSICIAN ASSISTANT

## 2018-12-09 PROCEDURE — 83690 ASSAY OF LIPASE: CPT | Performed by: PHYSICIAN ASSISTANT

## 2018-12-09 PROCEDURE — 0D598ZZ DESTRUCTION OF DUODENUM, VIA NATURAL OR ARTIFICIAL OPENING ENDOSCOPIC: ICD-10-PCS | Performed by: INTERNAL MEDICINE

## 2018-12-09 PROCEDURE — 84484 ASSAY OF TROPONIN QUANT: CPT | Performed by: NURSE PRACTITIONER

## 2018-12-09 PROCEDURE — 85730 THROMBOPLASTIN TIME PARTIAL: CPT | Performed by: PHYSICIAN ASSISTANT

## 2018-12-09 PROCEDURE — 85014 HEMATOCRIT: CPT | Performed by: NURSE PRACTITIONER

## 2018-12-09 PROCEDURE — 81003 URINALYSIS AUTO W/O SCOPE: CPT | Performed by: PHYSICIAN ASSISTANT

## 2018-12-09 RX ORDER — SODIUM CHLORIDE 9 MG/ML
125 INJECTION, SOLUTION INTRAVENOUS CONTINUOUS
Status: DISCONTINUED | OUTPATIENT
Start: 2018-12-09 | End: 2018-12-10

## 2018-12-09 RX ORDER — NICOTINE 21 MG/24HR
14 PATCH, TRANSDERMAL 24 HOURS TRANSDERMAL DAILY
Status: DISCONTINUED | OUTPATIENT
Start: 2018-12-09 | End: 2018-12-12

## 2018-12-09 RX ORDER — SODIUM CHLORIDE 9 MG/ML
125 INJECTION, SOLUTION INTRAVENOUS CONTINUOUS
Status: DISCONTINUED | OUTPATIENT
Start: 2018-12-09 | End: 2018-12-09

## 2018-12-09 RX ORDER — BUDESONIDE AND FORMOTEROL FUMARATE DIHYDRATE 160; 4.5 UG/1; UG/1
2 AEROSOL RESPIRATORY (INHALATION) 2 TIMES DAILY
Status: DISCONTINUED | OUTPATIENT
Start: 2018-12-09 | End: 2018-12-16 | Stop reason: HOSPADM

## 2018-12-09 RX ORDER — NITROGLYCERIN 0.4 MG/1
0.4 TABLET SUBLINGUAL ONCE
Status: COMPLETED | OUTPATIENT
Start: 2018-12-09 | End: 2018-12-09

## 2018-12-09 RX ORDER — SODIUM CHLORIDE, SODIUM GLUCONATE, SODIUM ACETATE, POTASSIUM CHLORIDE, MAGNESIUM CHLORIDE, SODIUM PHOSPHATE, DIBASIC, AND POTASSIUM PHOSPHATE .53; .5; .37; .037; .03; .012; .00082 G/100ML; G/100ML; G/100ML; G/100ML; G/100ML; G/100ML; G/100ML
75 INJECTION, SOLUTION INTRAVENOUS CONTINUOUS
Status: DISCONTINUED | OUTPATIENT
Start: 2018-12-09 | End: 2018-12-11

## 2018-12-09 RX ORDER — CALCITRIOL 0.25 UG/1
0.25 CAPSULE, LIQUID FILLED ORAL
COMMUNITY
End: 2018-12-16 | Stop reason: HOSPADM

## 2018-12-09 RX ADMIN — SODIUM CHLORIDE, SODIUM GLUCONATE, SODIUM ACETATE, POTASSIUM CHLORIDE, MAGNESIUM CHLORIDE, SODIUM PHOSPHATE, DIBASIC, AND POTASSIUM PHOSPHATE 75 ML/HR: .53; .5; .37; .037; .03; .012; .00082 INJECTION, SOLUTION INTRAVENOUS at 16:17

## 2018-12-09 RX ADMIN — SODIUM CHLORIDE 1000 ML: 0.9 INJECTION, SOLUTION INTRAVENOUS at 13:46

## 2018-12-09 RX ADMIN — SODIUM CHLORIDE 500 ML: 0.9 INJECTION, SOLUTION INTRAVENOUS at 14:16

## 2018-12-09 RX ADMIN — SODIUM CHLORIDE 80 MG: 9 INJECTION, SOLUTION INTRAVENOUS at 12:02

## 2018-12-09 RX ADMIN — SODIUM CHLORIDE 8 MG/HR: 9 INJECTION, SOLUTION INTRAVENOUS at 16:17

## 2018-12-09 RX ADMIN — SODIUM CHLORIDE 1000 ML: 0.9 INJECTION, SOLUTION INTRAVENOUS at 12:47

## 2018-12-09 RX ADMIN — NITROGLYCERIN 0.4 MG: 0.4 TABLET SUBLINGUAL at 14:13

## 2018-12-09 RX ADMIN — SODIUM CHLORIDE 125 ML/HR: 0.9 INJECTION, SOLUTION INTRAVENOUS at 10:42

## 2018-12-09 NOTE — ED NOTES
Call bell within reach, bell low position, side rail up    IVF infusing     Pio Chowdhury, RN  12/09/18 3053 Simone Silverio, RN  12/09/18 9193

## 2018-12-09 NOTE — H&P
H&P- Cathie Organ 1940, 66 y o  male MRN: [id number]    Unit/Bed#: -01 Encounter: [id number]    Primary Care Provider: Rona Erickson MD   Date and time admitted to hospital: 12/9/2018 10:10 AM        Melena   Assessment & Plan    · Suspected GI bleed in setting of Melena as well as episodes of BRBPR  · Prior Colonoscopies with Polyps which patient reports were benign   · Noted Hgb May 2018 12 9, now 9 8  · Place on protonix gtt  · On daily ASA/Plavix, will hold in setting of suspected GI bleeding  · Monitor Hgb q6h, would consider transfusion for <8 0 in setting of lateral ischemic EKG changes while in ED which improved post nitroglycerin administration   · Obtain type and screen  · GI consult, discussed with Dr Liz Locke, team aware     Ischemic chest pain   Assessment & Plan    · 12 Lead EKG on presentation without lateral ischemic change  While in ED developed Chest pain with noted concern for lateral ischemia in setting of ST Segment depression with TWave inversion      · Patient administered 1SL nitro in ER, pain subsequently pain free, repeated 12 Lead with EKG resolution  · Considered secondary to anemia an may require PRBC's however Hgb remains >9, would consider transfusion for <8 in setting of CAD with concern for ischemia  · On ASA/Plavix, will hold in setting of Melena / suspected GI Bleeding  · Obtain troponin and trend x3 or until peak  · Consider cardiology consult / EGD clearance in setting of ischemic changes     Acute kidney injury [facility])   Assessment & Plan    · History of CKD appears unspecified; however Stage III based on GFR  · GFR baseline appears 34-48  · Will hold ACEI  · Avoid nephrotoxic agents  · Monitor U/O as well as renal function     Lactic acidosis   Assessment & Plan    · Provided 3L 0 9% NSS while in ED  · Is does not appear to be secondary to an infectious etiology  · Will repeat q2 hours  · Place on maintenance fluids of Isolyte @75ml/h     CAD (coronary artery disease)   Assessment & Plan    · History of CAD on ASA/Plavix with prior history in Care Everywhere   · ECHO reviewed from June 2017 revealed EF 55%  · Will hold antihypertensives including Norvasc/Zestril/Lopressor and closely monitor BP               Leukocytosis   Assessment & Plan    · Doubtful of infectious etiology as no fevers, chills or symptoms concerning for such  · Believe stress related  · Closely monitor temps as well as WBC count off antibiotics  · CT A/P obtained within the ER Main pancreatic duct appears focally dilated at the head  Recommending follow-up with nonemergent MRCP or ERCP for further evaluation  Colonic diverticulosis without acute diverticulitis  Stable hyperdense left renal lesion  DM2 (diabetes mellitus, type 2) ([facility] )   Assessment & Plan    · No results found for: HGBA1C    No results for input(s): POCGLU in the last 72 hours  Blood Sugar Average: Last 72 hrs:    · Obtain accu-checks q6h as will be NPO in anticipation of scope tomorrow  · Place on SSI coverage, holding oral agents       Stage 3 chronic kidney disease ([facility] )   Assessment & Plan    · As noted above under NIDHI     COPD (chronic obstructive pulmonary disease) ([facility] )   Assessment & Plan    · Per [facility] chart review, patient known to [facility] Pulmonology  · Mild COPD well controlled on Symbicort and does not require supplemental home O2  · Continues to smoke, will encourage smoking cessation and will order nicotine patch  · Monitor respiratory status, no acute exacerbation, will place on Xopenex / Atrovent tid prn         Hypertension   Assessment & Plan    · Maintained on Norvasc, Zestril, and lopressor  · Will currently hold antihypertensives in setting of suspected GI bleed while monitoring hemodynamics  · Also holding ACEI in setting of NIDHI         VTE Prophylaxis: Pharmacologic VTE Prophylaxis contraindicated due to suspected acute GI bleeding  / sequential compression device   Code Status: Level 1 Full code  POLST: POLST form is not discussed and not completed at this time  Discussion with family: Discussed with patient, no family at bedside    Anticipated Length of Stay:  Patient will be admitted on an Inpatient basis with an anticipated length of stay of  > 2 midnights  Justification for Hospital Stay: monitoring Hgb in setting of suspected GI bleed as well as need for EGD possible Colonoscopy    Total Time for Visit, including Counseling / Coordination of Care: 45 minutes  Greater than 50% of this total time spent on direct patient counseling and coordination of care  Chief Complaint:   "I have had black poop"    History of Present Illness:    Nellie Whittington is a 66 y o  male who presented to [address] [place] [address] ER from home with the above chief complaint  Patient reports since Friday several daily bowel movements of black stools with at times bright red blood which does change the toilet water color  Patient reports having prior colonoscopies which revealed polyps; however no other concerns  He denies frequent use of NSAIDs, additionally denies  fevers, chills, abdominal pains, nausea or vomiting  Additionally denies heartburn or reflux type symptoms  Patients PMH most significant for COPD, CAD, PVD as well as Angioplasty and stenting to the bilateral lower extremities, maintained on ASA and Plavix  Workup in the ER included labs revealing sodium 137, potassium 4 2, chloride 102, bicarb 22, BUN 68 increased from prior May 2018 of 30, creatinine 2 08 which is increased from 1 61 also May of 2018  Baseline creatinine does appear to be consistent with stage III with a GFR of 34-38  LFTs normal except for elevated lipase of 422  CBC reveals a white blood cell count of 16, no bandemia, hemoglobin 9 8 which is decreased from May 2018 of 12 6, platelets 242  Patient while being assessed in the emergency department complained of significant sudden onset of chest discomfort    Repeat 12 lead EKG was obtained which was changed from his initial presenting EKG revealing lateral ST segment depressions as well as T-wave inversions  Patient was provided 1 sublingual nitroglycerin patient post administration completely pain free with resolution of ST segment depressions  Patient evaluated by the medical team for consideration of step-down admission  Review of Systems:    Review of Systems   Constitutional: Positive for appetite change  Negative for activity change, chills, diaphoresis, fatigue and fever  HENT: Negative  Eyes: Negative  Respiratory: Negative for cough, choking, chest tightness and shortness of breath  Cardiovascular: Positive for chest pain  Negative for palpitations and leg swelling  Gastrointestinal: Positive for blood in stool  Negative for abdominal distention, abdominal pain, constipation, diarrhea, nausea, rectal pain and vomiting  Endocrine: Negative  Genitourinary: Negative  Musculoskeletal: Negative  Skin: Negative  Allergic/Immunologic: Negative  Neurological: Positive for weakness  Negative for dizziness, syncope, light-headedness, numbness and headaches  Hematological: Negative for adenopathy  Does not bruise/bleed easily  Psychiatric/Behavioral: Negative  Past Medical and Surgical History:     Past Medical History:   Diagnosis Date    BPH (benign prostatic hyperplasia)     CKD (chronic kidney disease)     [facility]ch[facility] obstructive [facility] disease) ([facility]     Coronary artery disease     Diabetes mellitus ([facility] )     History of heart attack     Hyperlipidemia     Hypertension     PAD (peripheral artery disease) ([facility])        Past Surgical History:   Procedure Laterality Date    APPENDECTOMY      BACK SURGERY      x2 - lumbar spine    VASCULAR SURGERY      stent       Meds/Allergies:    Prior to Admission medications    Medication Sig Start Date End Date Taking?  Authorizing Provider   amLODIPine (NORVASC) 10 mg tablet Take 10 mg by mouth 2 (two) times a day     Yes Historical Provider, MD   aspirin (ECOTRIN LOW STRENGTH) 81 mg EC tablet Take 81 mg by mouth daily   Yes Historical Provider, MD   Budesonide-Formoterol Fumarate (SYMBICORT IN) Inhale 2 Inhalers 2 (two) times a day     Yes Historical Provider, MD   calcitriol (ROCALTROL) 0 25 mcg capsule Take 0 25 mcg by mouth 3 times per week   Yes Historical Provider, MD   chlorthalidone 25 mg tablet Take 25 mg by mouth daily   Yes Historical Provider, MD   clopidogrel (PLAVIX) 75 mg tablet Take 75 mg by mouth daily   Yes Historical Provider, MD   metoprolol tartrate (LOPRESSOR) 25 mg tablet Take 25 mg by mouth every 12 (twelve) hours   Yes Historical Provider, MD   pantoprazole (PROTONIX) 40 mg tablet Take 1 tablet (40 mg total) by mouth 2 (two) times a day before meals 5/13/18  Yes Christopher Castorena PA-C   pravastatin (PRAVACHOL) 40 mg tablet Take 40 mg by mouth daily   Yes Historical Provider, MD   Linagliptin (TRADJENTA) 5 MG TABS Take 5 mg by mouth daily    Historical Provider, MD   lisinopril (ZESTRIL) 40 mg tablet Take 40 mg by mouth daily    Historical Provider, MD   repaglinide (PRANDIN) 0 5 mg tablet Take 0 5 mg by mouth 3 (three) times a day before meals    Historical Provider, MD   SODIUM BICARBONATE PO Take by mouth    Historical Provider, MD   tamsulosin (FLOMAX) 0 4 mg Take 0 4 mg by mouth daily with dinner    Historical Provider, MD   hydrochlorothiazide (HYDRODIURIL) 25 mg tablet Take 25 mg by mouth daily  12/9/18  Historical Provider, MD     I have reviewed home medications with patient personally  Allergies: Allergies   Allergen Reactions    Contrast [Iodinated Diagnostic Agents]        Social History:     Marital Status:     Occupation: Retired  Patient Pre-hospital Living Situation: Lives at home performs own ADLs  Patient Pre-hospital Level of Mobility: fully mobile  Patient Pre-hospital Diet Restrictions: none  Substance Use History:   History   Alcohol Use No     History Smoking Status    Current Every Day Smoker    Packs/day: 0 50    Years: 60 00   Smokeless Tobacco    Never Used     Comment: heavy tobacco smoker per [employer]     History   Drug Use No       Family History:    non-contributory    Physical Exam:     Vitals:   Blood Pressure: 132/59 (12/09/18 1445)  Pulse: 89 (12/09/18 1445)  Temperature: 97 5 °F (36 4 °C) (12/09/18 1015)  Temp Source: Oral (12/09/18 1015)  Respirations: 22 (12/09/18 1445)  Weight - Scale: 70 3 kg (154 lb 15 7 oz) (12/09/18 1015)  SpO2: 96 % (12/09/18 1445)    Physical Exam    General: 67 y/o M laying in ER bed room 12, awake, alert, and oriented  Non-toxic appearing  HEENT: Normocephalic, atraumatic  PERRL, EOMI, sclera anicteric, conjunctiva pink, oropharynx patent, mucous membranes dry, tolerating secretions  Neck: supple, trachea midline  Heart: RRR without murmur, rub, or gallop  No reproducible chest pain on exam  Lungs: clear and equal all fields bilaterally, no wheezing, rales, or rhonchi  Abd: soft, non-tender, no guarding, rebound, or peritoneal signs, active BS noted  : no bocanegra  Back: no CVA tenderness  Skin: warm and dry  MSK: moving upper and lower extremities, distal pulses intact  Neuro: GCS 15, non focal    Additional Data:     Lab Results: I have personally reviewed pertinent reports          Results from last 7 days  Lab Units 12/09/18  1037   WBC Thousand/uL 16 14*   HEMOGLOBIN g/dL 9 8*   HEMATOCRIT % 29 5*   PLATELETS Thousands/uL 265   NEUTROS PCT % 67   LYMPHS PCT % 21   MONOS PCT % 8   EOS PCT % 1       Results from last 7 days  Lab Units 12/09/18  1037   SODIUM mmol/L 137   POTASSIUM mmol/L 4 2   CHLORIDE mmol/L 102   CO2 mmol/L 22   BUN mg/dL 68*   CREATININE mg/dL 2 08*   ANION GAP mmol/L 13   CALCIUM mg/dL 8 7   ALBUMIN g/dL 3 2*   TOTAL BILIRUBIN mg/dL 0 40   ALK PHOS U/L 55   ALT U/L 26   AST U/L 19   GLUCOSE RANDOM mg/dL 203*       Results from last 7 days  Lab Units 12/09/18  1037   INR  1 05 Results from last 7 days  Lab Units 12/09/18  1351 12/09/18  1140   LACTIC ACID mmol/L 0 9 3 1*       Imaging: I have personally reviewed pertinent reports  and I have personally reviewed pertinent films in PACS    CT abdomen pelvis wo contrast   Final Result by Bear White MD (12/09 1226)   Limited exam without IV and oral contrast    1   Main pancreatic duct appears focally dilated at the head  Recommend follow-up with nonemergent MRCP or ERCP for further evaluation to exclude underlying lesion  No obvious findings of pancreatitis based on this limited exam    2   Colonic diverticulosis without acute diverticulitis  3   Stable hyperdense left renal lesion  Recommend follow-up with nonemergent ultrasound  The study was marked in EPIC for significant notification  Workstation performed: [id number]         XR chest 1 view portable    (Results Pending)       EKG, Pathology, and Other Studies Reviewed on Admission:   · EKG: Telemetry as well as 12 lead EKGs reviewed, noted lateral ischemic changes with development of chest pain resolved post nitrate administration    Allscripts / Epic Records Reviewed: Yes     ** Please Note: This note has been constructed using a voice recognition system   **

## 2018-12-09 NOTE — ED PROCEDURE NOTE
Procedure  ECG 12 Lead Documentation  Date/Time: 12/9/2018 2:05 PM  Performed by: Sienna Casanova by: Ye Thompson     Indications / Diagnosis:  Chest pain  Patient location:  ED  Previous ECG:     Previous ECG:  Compared to current    Comparison ECG info:  ST depression II, III, AVF as well as lateral leads V4, V5, V6    Similarity:  Changes noted    Comparison to cardiac monitor: Yes    Interpretation:     Interpretation: non-specific    Rate:     ECG rate:  81    ECG rate assessment: normal    Rhythm:     Rhythm: sinus rhythm    Ectopy:     Ectopy: none    QRS:     QRS axis:  Normal    QRS intervals:  Normal  Conduction:     Conduction: normal    ST segments:     ST segments:  Abnormal    Depression:  II, III, aVF, V4, V5 and V6  T waves:     T waves: flattening                       Dona Chand PA-C  12/09/18 1425

## 2018-12-09 NOTE — ED NOTES
Returned from ct, call bell within reach, bed low position, side rail up  Aware nothing to eat or drink       Karsten Johnston RN  12/09/18 07

## 2018-12-09 NOTE — CONSULTS
Consultation - [facility]  Elvin Partida 66 y o  male MRN: [id number]  Unit/Bed#: YINA Encounter: [id number]        Consults    Reason for Consult / Principal Problem:  Melena    HPI: Elvin Partida is a 66y o  year old male with history of COPD, coronary artery disease, peripheral vascular disease, on aspirin and Plavix without history of recent coronary stents, who presented to the emergency room this morning with complaint of black bloody stools which started about 2 days ago  Patient said that he had several bowel movements which were black in color, which tinged the toilet bowl red  Patient denies any known history of GI bleeding  He had a colonoscopy many years ago, he does not recall if he has had an EGD  Denies any use of NSAIDs  Denies problems with acid reflux, heartburn or difficulty swallowing, does not take a PPI or H2 blocker  His hemoglobin was found to be 9 8, his baseline appears to fall between 11 and 13 based on labs from May of this year  BUN is 68 with creatinine 2 08         REVIEW OF SYSTEMS:    CONSTITUTIONAL: Denies any fever, chills, or rigors  Good appetite, and no recent weight loss  HEENT: No earache or tinnitus  Denies hearing loss or visual disturbances  CARDIOVASCULAR: No chest pain or palpitations  RESPIRATORY: Denies any cough, hemoptysis, shortness of breath or dyspnea on exertion  GASTROINTESTINAL: As noted in the History of Present Illness  GENITOURINARY: No problems with urination  Denies any hematuria or dysuria  NEUROLOGIC: No dizziness or vertigo, denies headaches  MUSCULOSKELETAL: Denies any muscle or joint pain  SKIN: Denies skin rashes or itching  ENDOCRINE: Denies excessive thirst  Denies intolerance to heat or cold  PSYCHOSOCIAL: Denies depression or anxiety  Denies any recent memory loss         Historical Information   Past Medical History:   Diagnosis Date    BPH (benign prostatic hyperplasia)     CKD (chronic kidney disease)     [facility] obstructive [facility]) ([facility] )     Coronary artery disease     Diabetes mellitus ([facility] )     History of heart attack     Hyperlipidemia     Hypertension     PAD [facility]peripheral artery disease) ([facility])      Past Surgical History:   Procedure Laterality Date    APPENDECTOMY      BACK SURGERY      x2 - lumbar spine    VASCULAR SURGERY      stent     Social History   History   Alcohol Use No     History   Drug Use No     History   Smoking Status    Current Every Day Smoker    Packs/day: 0 50    Years: 60 00   Smokeless Tobacco    Never Used     Comment: heavy tobacco smoker per [employer]     History reviewed  No pertinent family history  Meds/Allergies       (Not in a hospital admission)  Current Facility-Administered Medications   Medication Dose Route Frequency    multi-electrolyte (ISOLYTE-S PH 7 4 equivalent) IV solution  75 mL/hr Intravenous Continuous    pantoprazole (PROTONIX) 80 mg in sodium chloride 0 9 % 100 mL infusion  8 mg/hr Intravenous Continuous    sodium chloride 0 9 % infusion  125 mL/hr Intravenous Continuous       Allergies   Allergen Reactions    Contrast [Iodinated Diagnostic Agents]            Objective     Blood pressure 132/59, pulse 89, temperature 97 5 °F (36 4 °C), temperature source Oral, resp  rate 22, weight 70 3 kg (154 lb 15 7 oz), SpO2 96 %        Intake/Output Summary (Last 24 hours) at 12/09/18 1502  Last data filed at 12/09/18 1431   Gross per 24 hour   Intake             2209 ml   Output                0 ml   Net             2209 ml         PHYSICAL EXAM     General Appearance:   Alert, cooperative, no distress, appears stated age    HEENT:   Normocephalic, atraumatic, anicteric      Neck:  Supple, symmetrical, trachea midline, no adenopathy;    thyroid: no enlargement/tenderness/nodules; no carotid  bruit or JVD    Lungs:   Clear to auscultation bilaterally; no rales, rhonchi or wheezing; respirations unlabored    Heart[de-identified]   S1 and S2 normal; regular rate and rhythm; no murmur, rub, or gallop     Abdomen:   Soft, non-tender, non-distended; normal bowel sounds; no masses, no organomegaly    Genitalia:   Deferred    Rectal:   Deferred    Extremities:  No cyanosis, clubbing or edema    Pulses:  2+ and symmetric all extremities    Skin:  Skin color, texture, turgor normal, no rashes or lesions    Lymph nodes:  No palpable cervical, axillary or inguinal lymphadenopathy        Lab Results:   Admission on 12/09/2018   Component Date Value    Sodium 12/09/2018 137     Potassium 12/09/2018 4 2     Chloride 12/09/2018 102     CO2 12/09/2018 22     ANION GAP 12/09/2018 13     BUN 12/09/2018 68*    Creatinine 12/09/2018 2 08*    Glucose 12/09/2018 203*    Calcium 12/09/2018 8 7     AST 12/09/2018 19     ALT 12/09/2018 26     Alkaline Phosphatase 12/09/2018 55     Total Protein 12/09/2018 6 4     Albumin 12/09/2018 3 2*    Total Bilirubin 12/09/2018 0 40     eGFR 12/09/2018 30     WBC 12/09/2018 16 14*    RBC 12/09/2018 2 90*    Hemoglobin 12/09/2018 9 8*    Hematocrit 12/09/2018 29 5*    MCV 12/09/2018 102*    MCH 12/09/2018 33 8     MCHC 12/09/2018 33 2     RDW 12/09/2018 14 2     MPV 12/09/2018 11 9     Platelets 25/45/3838 265     nRBC 12/09/2018 0     Neutrophils Relative 12/09/2018 67     Immat GRANS % 12/09/2018 2     Lymphocytes Relative 12/09/2018 21     Monocytes Relative 12/09/2018 8     Eosinophils Relative 12/09/2018 1     Basophils Relative 12/09/2018 1     Neutrophils Absolute 12/09/2018 11 09*    Immature Grans Absolute 12/09/2018 0 33*    Lymphocytes Absolute 12/09/2018 3 33     Monocytes Absolute 12/09/2018 1 22     Eosinophils Absolute 12/09/2018 0 09     Basophils Absolute 12/09/2018 0 08     Protime 12/09/2018 13 4     INR 12/09/2018 1 05     PTT 12/09/2018 24*    Lipase 12/09/2018 422*    ABO Grouping 12/09/2018 B     Rh Factor 12/09/2018 Positive     Antibody Screen 12/09/2018 Negative     Specimen Expiration Date 12/09/2018 [id number]     LACTIC ACID 12/09/2018 3 1*    Color, UA 12/09/2018 Light Yellow     Clarity, UA 12/09/2018 Clear     Specific Gravity, UA 12/09/2018 1 010     pH, UA 12/09/2018 6 5     Leukocytes, UA 12/09/2018 Negative     Nitrite, UA 12/09/2018 Negative     Protein, UA 12/09/2018 Negative     Glucose, UA 12/09/2018 100 (1/10%)*    Ketones, UA 12/09/2018 Negative     Urobilinogen, UA 12/09/2018 0 2     Bilirubin, UA 12/09/2018 Negative     Blood, UA 12/09/2018 Negative     LACTIC ACID 12/09/2018 0 9     Ventricular Rate 12/09/2018 81     Atrial Rate 12/09/2018 81     [place] Interval 12/09/2018 268     QRSD Interval 12/09/2018 84     QT Interval 12/09/2018 352     QTC Interval 12/09/2018 408     P Axis 12/09/2018 97     QRS [place] 12/09/2018 -21     T Wave Axis 12/09/2018 184     Troponin I 12/09/2018 0 03     Ventricular Rate 12/09/2018 82     Atrial Rate 12/09/2018 82     [place] Interval 12/09/2018 236     QRSD Interval 12/09/2018 68     QT Interval 12/09/2018 504     QTC Interval 12/09/2018 588     P Axis 12/09/2018 62     QRS Axis 12/09/2018 -62     T Wave Axis 12/09/2018 75     Ventricular Rate 12/09/2018 74     Atrial Rate 12/09/2018 357     QRSD Interval 12/09/2018 76     QT Interval 12/09/2018 352     QTC Interval 12/09/2018 390     QRS Axis 12/09/2018 -46     T Wave Axis 12/09/2018 101        Imaging Studies: I have personally reviewed pertinent reports  CT ABDOMEN AND PELVIS WITHOUT IV CONTRAST     INDICATION:   Abdominal pain, unspecified      COMPARISON:  CT abdomen pelvis 5/11/2018     TECHNIQUE:  CT examination of the abdomen and pelvis was performed without intravenous contrast   Axial, sagittal, and coronal 2D reformatted images were created from the source data and submitted for interpretation       Radiation dose length product (DLP) for this visit:  330 mGy-cm     This examination, like all CT scans performed in the [facility], was performed utilizing techniques to minimize radiation dose exposure, including the use of iterative   reconstruction and automated exposure control       Enteric contrast was not administered       FINDINGS:  The exam is limited without IV and oral contrast   ABDOMEN     LOWER CHEST:  Peripheral interstitial prominence noted at the lung bases which may represent interstitial lung disease  This is similar to the prior exam      LIVER/BILIARY TREE:  Unremarkable      GALLBLADDER:  No calcified gallstones  No pericholecystic inflammatory change      SPLEEN:  Unremarkable      PANCREAS:  Main pancreatic duct appears focally dilated at the head, not clearly seen on the prior examination  Region is obscured by streak artifact on the prior study  Tiny calcifications at the pancreatic head and uncinate process  Remaining   pancreatic duct at the body and tail the pancreas is not dilated  No obvious peripancreatic fat stranding      ADRENAL GLANDS:  Unremarkable      KIDNEYS/URETERS:  No hydronephrosis  Slight renal cortical thinning on the left  Stable exophytic renal lesion on the left lower pole measuring up to 1 7 cm  This measures higher than simple fluid attenuation      STOMACH AND BOWEL:  Limited evaluation without contrast   No bowel obstruction  There is colonic diverticulosis without findings for acute diverticulitis  Sigmoid colon is redundant      APPENDIX:  There are expected postoperative changes of appendectomy      ABDOMINOPELVIC CAVITY:  No ascites or free intraperitoneal air  No lymphadenopathy      VESSELS:  Dense wall calcification along the aorta and arterial branches  Ectasia of the abdominal aorta without hans aneurysm    Stents in the bilateral common iliac arteries and bilateral external iliac artery      PELVIS     REPRODUCTIVE ORGANS:  Prostate is mild moderately prominent      URINARY BLADDER:  Unremarkable      ABDOMINAL WALL/INGUINAL REGIONS:  Small fat-containing inguinal hernias  Tiny fat-containing umbilical hernia      OSSEOUS STRUCTURES:  No acute fracture or destructive osseous lesion  Multilevel degenerative spurring of the spine  Prior posterior fusion of the lumbar spine, L2-L5  Grade 2 anterolisthesis of L4 and L5      IMPRESSION:  Limited exam without IV and oral contrast   1   Main pancreatic duct appears focally dilated at the head  Recommend follow-up with nonemergent MRCP or ERCP for further evaluation to exclude underlying lesion  No obvious findings of pancreatitis based on this limited exam   2   Colonic diverticulosis without acute diverticulitis  3   Stable hyperdense left renal lesion  Recommend follow-up with nonemergent ultrasound  ASSESSMENT/PLAN:     1  Melena x2 days with heme-positive stool, drop in hemoglobin approximately 3 to 4 g from baseline, with elevated BUN  Appears hemodynamically stable at this time with no signs of active hemorrhage, but rule out GI bleeding from upper source such as peptic ulcer, AVM, malignancy    - plan for EGD tomorrow  - Protonix drip today  - monitor hemoglobin closely, transfuse if needed  - patient's case and plan were discussed with ICU team and ER physician  - NPO  - procedure was explained in detail to the patient at this time including risks and benefits; risks including but not limited to infection, perforation and bleeding      2  Nonspecific dilatation of the main pancreatic duct noted on patient's CT scan; no evidence of pancreatitis clinically  Rule out underlying lesion    - recommend non urgent MRCP for further evaluation, this can be done in the outpatient setting depending on patient's clinical course       The patient was seen and examined by Dr La Nena Patel, all alejandre medical decisions were made with Dr La Nena Patel  Thank you for allowing us to participate in the care of this pleasant patient  We will follow up with you closely

## 2018-12-09 NOTE — ASSESSMENT & PLAN NOTE
· Per [facility] chart review, patient known to [facility]  · Mild COPD well controlled on Symbicort and does not require supplemental home O2  · Continues to smoke, will encourage smoking cessation and will order nicotine patch  · Monitor respiratory status, no acute exacerbation, will place on Xopenex / Atrovent tid prn

## 2018-12-09 NOTE — ASSESSMENT & PLAN NOTE
· Suspected GI bleed in setting of Melena as well as episodes of BRBPR  · Prior Colonoscopies with Polyps which patient reports were benign   · Noted Hgb May 2018 12 9, now 9 8  · Place on protonix gtt  · On daily ASA/Plavix, will hold in setting of suspected GI bleeding  · Monitor Hgb q6h, would consider transfusion for <8 0 in setting of lateral ischemic EKG changes while in ED which improved post nitroglycerin administration   · Obtain type and screen  · GI consult, discussed with Dr Nola Castillo, team aware

## 2018-12-09 NOTE — ASSESSMENT & PLAN NOTE
· 12 Lead EKG on presentation without lateral ischemic change  While in ED developed Chest pain with noted concern for lateral ischemia in setting of ST Segment depression with TWave inversion      · Patient administered 1SL nitro in ER, pain subsequently pain free, repeated 12 Lead with EKG resolution  · Considered secondary to anemia an may require PRBC's however Hgb remains >9, would consider transfusion for <8 in setting of CAD with concern for ischemia  · On ASA/Plavix, will hold in setting of Melena / suspected GI Bleeding  · Obtain troponin and trend x3 or until peak  · Consider cardiology consult / EGD clearance in setting of ischemic changes

## 2018-12-09 NOTE — ED NOTES
KANE Duong aware lactic is 3 1     Haley Miramontes, [address] [place] [address]  12/09/18 3034

## 2018-12-09 NOTE — ED PROVIDER NOTES
History  Chief Complaint   Patient presents with    Black or Bloody Stool     Pt reports having blood in his stool beginning yesterday  Pt reports bright red blood when wiping and filling up the topilet bowl  Pt denies abd pain, N/V, reports taking plavix and asa daily  Patient is a 71-year-old male with a history of COPD hypertension hyperlipidemia on Plavix and aspirin  For CAD and previous MI approx 30 years ago per pt no stents or CABG who presents to the emergency department for evaluation of a black bloody stools that developed over the last 24 hours with mild amount of upper abdominal pain  Patient otherwise denies fevers chills nausea vomiting diarrhea chest pain shortness of breath headaches or dizziness  Prior to Admission Medications   Prescriptions Last Dose Informant Patient Reported? Taking?    Budesonide-Formoterol Fumarate (SYMBICORT IN)   Yes Yes   Sig: Inhale 2 Inhalers 2 (two) times a day     Linagliptin (TRADJENTA) 5 MG TABS   Yes No   Sig: Take 5 mg by mouth daily   SODIUM BICARBONATE PO   Yes No   Sig: Take by mouth   amLODIPine (NORVASC) 10 mg tablet   Yes Yes   Sig: Take 10 mg by mouth 2 (two) times a day     aspirin (ECOTRIN LOW STRENGTH) 81 mg EC tablet   Yes Yes   Sig: Take 81 mg by mouth daily   calcitriol (ROCALTROL) 0 25 mcg capsule   Yes Yes   Sig: Take 0 25 mcg by mouth 3 times per week   chlorthalidone 25 mg tablet   Yes Yes   Sig: Take 25 mg by mouth daily   clopidogrel (PLAVIX) 75 mg tablet   Yes Yes   Sig: Take 75 mg by mouth daily   lisinopril (ZESTRIL) 40 mg tablet   Yes No   Sig: Take 40 mg by mouth daily   metoprolol tartrate (LOPRESSOR) 25 mg tablet   Yes Yes   Sig: Take 25 mg by mouth every 12 (twelve) hours   pantoprazole (PROTONIX) 40 mg tablet   No Yes   Sig: Take 1 tablet (40 mg total) by mouth 2 (two) times a day before meals   pravastatin (PRAVACHOL) 40 mg tablet   Yes Yes   Sig: Take 40 mg by mouth daily   repaglinide (PRANDIN) 0 5 mg tablet   Yes No   Sig: Take 0 5 mg by mouth 3 (three) times a day before meals   tamsulosin (FLOMAX) 0 4 mg   Yes No   Sig: Take 0 4 mg by mouth daily with dinner      Facility-Administered Medications: None       Past Medical History:   Diagnosis Date    BPH (benign prostatic hyperplasia)     CKD (chronic kidney disease)     COPD [place]chronic obstructive pulmonary disease) [place] [address] [place]     Coronary artery disease     Diabetes mellitus ([place] [address] )     History of heart attack     Hyperlipidemia     Hypertension     PAD (peripheral artery disease) ([place] [address] )        Past Surgical History:   Procedure Laterality Date    APPENDECTOMY      BACK SURGERY      x2 - lumbar spine    VASCULAR SURGERY      stent       History reviewed  No pertinent family history  I have reviewed and agree with the history as documented  Social History   Substance Use Topics    Smoking status: Current Every Day Smoker     Packs/day: 0 50     Years: 60 00    Smokeless tobacco: Never Used      Comment: heavy tobacco smoker per [employer]    Alcohol use No        Review of Systems   Constitutional: Negative for chills, diaphoresis, fatigue and fever  HENT: Negative for congestion, ear pain, rhinorrhea, sneezing and sore throat  Respiratory: Negative for cough, shortness of breath, wheezing and stridor  Cardiovascular: Negative for chest pain, palpitations and leg swelling  Gastrointestinal: Positive for abdominal pain and blood in stool  Negative for abdominal distention, constipation, diarrhea, nausea and vomiting  Genitourinary: Negative for difficulty urinating, dysuria, frequency, hematuria and urgency  Musculoskeletal: Negative for arthralgias, back pain, gait problem, joint swelling, myalgias, neck pain and neck stiffness  Skin: Negative for color change, pallor, rash and wound  Neurological: Negative for dizziness, syncope, weakness, light-headedness and headaches  All other systems reviewed and are negative        Physical Exam  Physical Exam   Constitutional: He is oriented to person, place, and time  He appears well-developed and well-nourished  No distress  HENT:   Head: Normocephalic and atraumatic  Right Ear: External ear normal    Left Ear: External ear normal    Nose: Nose normal    Mouth/Throat: Oropharynx is clear and moist    Eyes: Pupils are equal, round, and reactive to light  Conjunctivae and EOM are normal    Neck: Normal range of motion  Neck supple  No thyromegaly present  Cardiovascular: Normal rate, regular rhythm and normal heart sounds  Exam reveals no gallop and no friction rub  No murmur heard  Pulmonary/Chest: Effort normal and breath sounds normal  No stridor  Abdominal: Soft  Bowel sounds are normal  He exhibits no distension and no mass  There is tenderness  There is no rebound and no guarding  No hernia  Mild epigastric tenderness no rebound or guarding  Genitourinary: Rectum normal    Genitourinary Comments: Rectal exam reveals grossly bloody stools no tenderness or masses appreciated  Musculoskeletal: He exhibits no edema, tenderness or deformity  Lymphadenopathy:     He has no cervical adenopathy  Neurological: He is alert and oriented to person, place, and time  Skin: He is not diaphoretic  Psychiatric: He has a normal mood and affect  His behavior is normal    Nursing note and vitals reviewed        Vital Signs  ED Triage Vitals [12/09/18 1015]   Temperature Pulse Respirations Blood Pressure SpO2   97 5 °F (36 4 °C) 80 (!) 24 140/63 97 %      Temp Source Heart Rate Source Patient Position - Orthostatic VS BP Location FiO2 (%)   Oral Monitor Sitting Right arm --      Pain Score       No Pain           Vitals:    12/09/18 1431 12/09/18 1435 12/09/18 1445 12/09/18 1528   BP: 124/57 115/55 132/59 151/80   Pulse: 85 89 89 71   Patient Position - Orthostatic VS:    Sitting       Visual Acuity      ED Medications  Medications   pantoprazole (PROTONIX) 80 mg in sodium chloride 0 9 % 100 mL infusion (8 mg/hr Intravenous New Bag 12/9/18 1617)   multi-electrolyte (ISOLYTE-S PH 7 4 equivalent) IV solution (75 mL/hr Intravenous New Bag 12/9/18 1617)   budesonide-formoterol (SYMBICORT) 160-4 5 mcg/act inhaler 2 puff (not administered)   nicotine (NICODERM CQ) 14 mg/24hr TD 24 hr patch 14 mg (14 mg Transdermal Not Given 12/9/18 1616)   insulin lispro (HumaLOG) 100 units/mL subcutaneous injection 1-5 Units (not administered)   sodium chloride 0 9 % infusion (0 mL/hr Intravenous Hold 12/9/18 1619)   pantoprazole (PROTONIX) 80 mg in sodium chloride 0 9 % 100 mL IVPB (0 mg Intravenous Stopped 12/9/18 1220)   sodium chloride 0 9 % bolus 1,000 mL (0 mL Intravenous Stopped 12/9/18 1345)     Followed by   sodium chloride 0 9 % bolus 1,000 mL (0 mL Intravenous Stopped 12/9/18 1415)     Followed by   sodium chloride 0 9 % bolus 1,000 mL (0 mL Intravenous Stopped 12/9/18 1431)   nitroglycerin (NITROSTAT) SL tablet 0 4 mg (0 4 mg Sublingual Given 12/9/18 1413)       Diagnostic Studies  Results Reviewed     Procedure Component Value Units Date/Time    Troponin I [[id number]]  (Normal) Collected:  12/09/18 1435    Lab Status:  Final result Specimen:  Blood from Arm, Right Updated:  12/09/18 1501     Troponin I 0 03 ng/mL     Lactic acid, plasma [[id number]]  (Normal) Collected:  12/09/18 1351    Lab Status:  Final result Specimen:  Blood from Arm, Left Updated:  12/09/18 1438     LACTIC ACID 0 9 mmol/L     Narrative:         Result may be elevated if tourniquet was used during collection      UA w Reflex to Microscopic w Reflex to Culture [[id number]]  (Abnormal) Collected:  12/09/18 1405    Lab Status:  Final result Specimen:  Urine from Urine, Clean Catch Updated:  12/09/18 1411     Color, UA Light Yellow     Clarity, UA Clear     Specific Gravity, UA 1 010     pH, UA 6 5     Leukocytes, UA Negative     Nitrite, UA Negative     Protein, UA Negative mg/dl      Glucose,  (1/10%) (A) mg/dl      Ketones, UA Negative mg/dl Urobilinogen, UA 0 2 E U /dl      Bilirubin, UA Negative     Blood, UA Negative    Procalcitonin [[id number]] Collected:  12/09/18 1351    Lab Status: In process Specimen:  Blood from Arm, Right Updated:  12/09/18 1354    Lactic acid, plasma [[id number]]  (Abnormal) Collected:  12/09/18 1140    Lab Status:  Final result Specimen:  Blood from Arm, Right Updated:  12/09/18 1228     LACTIC ACID 3 1 (HH) mmol/L     Narrative:         Result may be elevated if tourniquet was used during collection  Blood culture #1 [[id number]] Collected:  12/09/18 1140    Lab Status: In process Specimen:  Blood from Arm, Left Updated:  12/09/18 1157    Blood culture #2 [[id number]] Collected:  12/09/18 1140    Lab Status: In process Specimen:  Blood from Arm, Right Updated:  12/09/18 1157    Comprehensive metabolic panel [[id number]]  (Abnormal) Collected:  12/09/18 1037    Lab Status:  Final result Specimen:  Blood from Arm, Right Updated:  12/09/18 1106     Sodium 137 mmol/L      Potassium 4 2 mmol/L      Chloride 102 mmol/L      CO2 22 mmol/L      ANION GAP 13 mmol/L      BUN 68 (H) mg/dL      Creatinine 2 08 (H) mg/dL      Glucose 203 (H) mg/dL      Calcium 8 7 mg/dL      AST 19 U/L      ALT 26 U/L      Alkaline Phosphatase 55 U/L      Total Protein 6 4 g/dL      Albumin 3 2 (L) g/dL      Total Bilirubin 0 40 mg/dL      eGFR 30 ml/min/1 73sq m     Narrative:         National Kidney Disease Education Program recommendations are as follows:  GFR calculation is accurate only with a steady state creatinine  Chronic Kidney disease less than 60 ml/min/1 73 sq  meters  Kidney failure less than 15 ml/min/1 73 sq  meters      Lipase [[id number]]  (Abnormal) Collected:  12/09/18 1037    Lab Status:  Final result Specimen:  Blood from Arm, Right Updated:  12/09/18 1104     Lipase 422 (H) u/L     APTT [[id number]]  (Abnormal) Collected:  12/09/18 1037    Lab Status:  Final result Specimen:  Blood from Arm, Right Updated:  12/09/18 1104     PTT 24 (L) seconds     Protime-INR [[id number]]  (Normal) Collected:  12/09/18 1037    Lab Status:  Final result Specimen:  Blood from Arm, Right Updated:  12/09/18 1103     Protime 13 4 seconds      INR 1 05    CBC and differential [[id number]]  (Abnormal) Collected:  12/09/18 1037    Lab Status:  Final result Specimen:  Blood from Arm, Right Updated:  12/09/18 1050     WBC 16 14 (H) Thousand/uL      RBC 2 90 (L) Million/uL      Hemoglobin 9 8 (L) g/dL      Hematocrit 29 5 (L) %       (H) fL      MCH 33 8 pg      MCHC 33 2 g/dL      RDW 14 2 %      MPV 11 9 fL      Platelets 657 Thousands/uL      nRBC 0 /100 WBCs      Neutrophils Relative 67 %      Immat GRANS % 2 %      Lymphocytes Relative 21 %      Monocytes Relative 8 %      Eosinophils Relative 1 %      Basophils Relative 1 %      Neutrophils Absolute 11 09 (H) Thousands/µL      Immature Grans Absolute 0 33 (H) Thousand/uL      Lymphocytes Absolute 3 33 Thousands/µL      Monocytes Absolute 1 22 Thousand/µL      Eosinophils Absolute 0 09 Thousand/µL      Basophils Absolute 0 08 Thousands/µL                  CT abdomen pelvis wo contrast   Final Result by Sage Bui MD (12/09 1226)   Limited exam without IV and oral contrast    1   Main pancreatic duct appears focally dilated at the head  Recommend follow-up with nonemergent MRCP or ERCP for further evaluation to exclude underlying lesion  No obvious findings of pancreatitis based on this limited exam    2   Colonic diverticulosis without acute diverticulitis  3   Stable hyperdense left renal lesion  Recommend follow-up with nonemergent ultrasound  The study was marked in EPIC for significant notification              Workstation performed: [id number]         XR chest 1 view portable    (Results Pending)              Procedures  ECG 12 Lead Documentation  Date/Time: 12/9/2018 10:52 AM  Performed by: Juan Bailey  Authorized by: Juan Bailey     Indications / Diagnosis:  Gi bleed  ECG reviewed by me, the ED Provider: yes    Patient location:  ED  Previous ECG:     Previous ECG:  Compared to current    Similarity:  No change    Comparison to cardiac monitor: Yes    Interpretation:     Interpretation: non-specific    Rate:     ECG rate:  69    ECG rate assessment: normal    Rhythm:     Rhythm: sinus rhythm    Ectopy:     Ectopy: none    QRS:     QRS axis:  Left  Conduction:     Conduction: abnormal      Abnormal conduction: 1st degree    ST segments:     ST segments:  Normal  T waves:     T waves: normal               Phone Contacts  ED Phone Contact    ED Course                         Initial Sepsis Screening     [address] Name 12/09/18 1315                Is the patient's history suggestive of a new or worsening infection? No  -SA        Suspected source of infection suspect infection, source unknown  -SA        Are two or more of the following signs & symptoms of infection both present and new to the patient? (!)  Yes (Proceed)  -SA        Indicate SIRS criteria Leukocytosis (WBC > 72959 IJL); Tachypnea > 20 resp per min  -SA        If the answer is yes to both questions, suspicion of sepsis is present          If severe sepsis is present AND tissue hypoperfusion perists in the hour after fluid resuscitation or lactate > 4, the patient meets criteria for SEPTIC SHOCK          Are any of the following organ dysfunction criteria present within 6 hours of suspected infection and SIRS criteria that are NOT considered to be chronic conditions? (!)  Yes  -SA        Organ dysfunction Creatinine > 2 0 mg/dL  -SA        Date of presentation of severe sepsis 12/09/18  -SA        Time of presentation of severe sepsis 1358  -SA        Tissue hypoperfusion persists in the hour after crystalloid fluid administration, evidenced, by either:          Was hypotension present within one hour of the conclusion of crystalloid fluid administration?           Date of presentation of septic shock          Time of presentation of septic shock            User Key  (r) = Recorded By, (t) = Taken By, (c) = Cosigned By    Initials Name Provider Type    SA Dona Chand PA-C Physician Assistant           Default Flowsheet Data (last 720 hours)      Sepsis Reassess     Row Name 12/09/18 1441                   Repeat Volume Status and Tissue Perfusion Assessment Performed    Repeat Volume Status and Tissue Perfusion Assessment Performed Yes  -SA           Volume Status and Tissue Perfusion Post Fluid Resuscitation- Must Document 5 of the Following 7:    Vital Signs Reviewed (HR, RR, BP, T) Yes  -SA        Arterial Oxygen Saturation Reviewed (POx, SaO2 or SpO2) Yes (comment %)   98% R/A  -SA        Cardio Normal S1/S2  -SA        Pulmonary (!)  Normal effort; Wheezes; Rhonchi;Other (comment)   History of COPD had wheezing and rhonchi upon initial presentation  -SA        Capillary Refill Brisk  -SA        Peripheral Pulses Radial  -SA        Peripheral Pulse +3  -SA        Skin Warm;Dry  -SA        Urine output assessed Adequate  -SA           *OR*   Intensive Monitoring- Must Document One of the Following Four *:    Vital Signs Reviewed          * Central Venous Pressure (CVP or RAP)          * Central Venous Oxygen (SVO2, ScvO2 or Oxygen saturation via central catheter)          * Bedside Cardiovascular US in IVC diameter and % collapse          * Passive Leg Raise OR Crystalloid Challenge            User Key  (r) = Recorded By, (t) = Taken By, (c) = Cosigned By    Initials Name Provider Type    SA Dona Chand PA-C Physician Assistant                MDM  Number of Diagnoses or Management Options  Diagnosis management comments: 66-year-old male presents to the emergency department for evaluation of bloody stools over the last 48 hours and mild upper abdominal pain  He does take Plavix and aspirin on a regular basis    As labs return his respiratory rate is greater than 24 although he does have a history COPD this may be his baseline his white count is 16 1 blood cultures and lactate ordered  1417:  Patient lactate 3 1 30 mL/kg fluid bolus initiated repeat lactate ordered around 1400 patient started to complain of chest pain 2nd EKG was obtained he has ST depression in V5 and V6 which is changed from his previous EKG today that was done at 10:52 a m  Consistent with ischemia  Nitro 0 4 mg sublingual given will assess response in the next time 50 minutes  Spoke with Sushil Steen from [phone] will see patient in the ED     1435: Pt pain resolved after 1 SL nitro  Repeat ECG shows improvement of ST depression in both inferior and lateral leads  CritCare Time    Disposition  Final diagnoses:   GI bleed   Acute kidney injury ([facility] [address] )   Unstable angina ([facility] [address] )   SIRS (systemic inflammatory response syndrome) ([facility] [address] )     Time reflects when diagnosis was documented in both MDM as applicable and the Disposition within this note     Time User Action Codes Description Comment    12/9/2018  2:37 PM Elisa Constable Add [K92 2] GI bleed     12/9/2018  2:37 PM Elisa Constable Add [N17 9] Acute kidney injury ([facility] [address] )     12/9/2018  2:37 PM Elisa Constable Add [I20 0] Unstable angina ([facility] [address] )     12/9/2018  2:37 PM Elisa Constable Add [R65 10] SIRS (systemic inflammatory response syndrome) ([facility] [address] )     12/9/2018  3:01 PM Joselin Trevino [K92 1] Polina     12/9/2018  3:01 PM [facility] [id number] [id number] [K92 1] [address]       ED Disposition     ED Disposition Condition Comment    Admit  Case was discussed with Kvng Lieberman and the patient's admission status was agreed to be Admission Status: inpatient status to the service of Dr Coco Edwards           Follow-up Information    None         Current Discharge Medication List      CONTINUE these medications which have NOT CHANGED    Details   amLODIPine (NORVASC) 10 mg tablet Take 10 mg by mouth 2 (two) times a day        aspirin (ECOTRIN LOW STRENGTH) 81 mg EC tablet Take 81 mg by mouth daily      Budesonide-Formoterol Fumarate (SYMBICORT IN) Inhale 2 Inhalers 2 (two) times a day        calcitriol (ROCALTROL) 0 25 mcg capsule Take 0 25 mcg by mouth 3 times per week      chlorthalidone 25 mg tablet Take 25 mg by mouth daily      clopidogrel (PLAVIX) 75 mg tablet Take 75 mg by mouth daily      metoprolol tartrate (LOPRESSOR) 25 mg tablet Take 25 mg by mouth every 12 (twelve) hours      pantoprazole (PROTONIX) 40 mg tablet Take 1 tablet (40 mg total) by mouth 2 (two) times a day before meals  Qty: 60 tablet, Refills: 0    Associated Diagnoses: Duodenitis      pravastatin (PRAVACHOL) 40 mg tablet Take 40 mg by mouth daily      Linagliptin (TRADJENTA) 5 MG TABS Take 5 mg by mouth daily      lisinopril (ZESTRIL) 40 mg tablet Take 40 mg by mouth daily      repaglinide (PRANDIN) 0 5 mg tablet Take 0 5 mg by mouth 3 (three) times a day before meals      SODIUM BICARBONATE PO Take by mouth      tamsulosin (FLOMAX) 0 4 mg Take 0 4 mg by mouth daily with dinner           No discharge procedures on file      ED Provider  Electronically Signed by           Jany Mascorro PA-C  12/09/18 [address] Hamzah Gonzales PA-C  12/09/18 5520

## 2018-12-09 NOTE — ANESTHESIA PREPROCEDURE EVALUATION
Review of Systems/Medical History          Cardiovascular  Hyperlipidemia, Hypertension , Past MI , CAD , PVD,    Pulmonary  Smoker cigarette smoker  , COPD ,        GI/Hepatic      Comment: Patricia, BRBPR     Chronic kidney disease stage 3,        Endo/Other  Diabetes ,      GYN       Hematology   Musculoskeletal       Neurology   Psychology           Physical Exam    Airway    Mallampati score: I  TM Distance: >3 FB  Neck ROM: full     Dental   upper dentures and lower dentures,     Cardiovascular      Pulmonary  Breath sounds clear to auscultation, Decreased breath sounds,     Other Findings        Anesthesia Plan  ASA Score- 4     Anesthesia Type- IV sedation with anesthesia with ASA Monitors  Additional Monitors:   Airway Plan:         Plan Factors-Patient not instructed to abstain from smoking on day of procedure  Patient did not smoke on day of surgery  Induction- intravenous  Postoperative Plan-     Informed Consent- Anesthetic plan and risks discussed with patient  I personally reviewed this patient with the CRNA  Discussed and agreed on the Anesthesia Plan with the CRNA               Lab Results   Component Value Date    GLUC 132 12/10/2018    ALT 20 12/10/2018    AST 17 12/10/2018    BUN 42 (H) 12/10/2018    CALCIUM 8 5 12/10/2018     12/10/2018    CO2 21 12/10/2018    CREATININE 1 75 (H) 12/10/2018    INR 1 05 12/09/2018    HCT 26 2 (L) 12/10/2018    HGB 9 6 (L) 12/10/2018     12/10/2018    K 3 7 12/10/2018    WBC 14 30 (H) 12/10/2018

## 2018-12-09 NOTE — ASSESSMENT & PLAN NOTE
· History of CKD appears unspecified; however Stage III based on GFR  · GFR baseline appears 34-48  · Will hold ACEI  · Avoid nephrotoxic agents  · Monitor U/O as well as renal function

## 2018-12-09 NOTE — ED PROCEDURE NOTE
Procedure  CriticalCare Time  Performed by: Cj Galicia  Authorized by: Cj Galicia     Critical care provider statement:     Critical care time (minutes):  120    Critical care start time:  12/9/2018 1:30 PM    Critical care end time:  12/9/2018 2:35 PM    Critical care time was exclusive of:  Separately billable procedures and treating other patients    Critical care was necessary to treat or prevent imminent or life-threatening deterioration of the following conditions:  Sepsis and renal failure    Critical care was time spent personally by me on the following activities:  Discussions with consultants, evaluation of patient's response to treatment, examination of patient, review of old charts, re-evaluation of patient's condition, ordering and review of radiographic studies, ordering and review of laboratory studies and ordering and performing treatments and interventions    I assumed direction of critical care for this patient from another provider in my specialty: juan Sánchez PA-C  12/09/18 2277

## 2018-12-09 NOTE — ASSESSMENT & PLAN NOTE
· No results found for: HGBA1C    No results for input(s): POCGLU in the last 72 hours      Blood Sugar Average: Last 72 hrs:    · Obtain accu-checks q6h as will be NPO in anticipation of scope tomorrow  · Place on SSI coverage, holding oral agents

## 2018-12-09 NOTE — ASSESSMENT & PLAN NOTE
· Maintained on Norvasc, Zestril, and lopressor  · Will currently hold antihypertensives in setting of suspected GI bleed while monitoring hemodynamics  · Also holding ACEI in setting of NIDHI

## 2018-12-09 NOTE — ASSESSMENT & PLAN NOTE
· Provided 3L 0 9% NSS while in ED  · Is does not appear to be secondary to an infectious etiology  · Will repeat q2 hours  · Place on maintenance fluids of Isolyte @75ml/h

## 2018-12-09 NOTE — SEPSIS NOTE
Sepsis Note   Andrea Marin 66 y o  male MRN: [id number]  Unit/Bed#: ED 12 Encounter: [id number]            Initial Sepsis Screening     [address] Name 12/09/18 1315                Is the patient's history suggestive of a new or worsening infection? No  -SA        Suspected source of infection suspect infection, source unknown  -SA        Are two or more of the following signs & symptoms of infection both present and new to the patient? (!)  Yes (Proceed)  -SA        Indicate SIRS criteria Leukocytosis (WBC > 62285 IJL); Tachypnea > 20 resp per min  -SA        If the answer is yes to both questions, suspicion of sepsis is present          If severe sepsis is present AND tissue hypoperfusion perists in the hour after fluid resuscitation or lactate > 4, the patient meets criteria for SEPTIC SHOCK          Are any of the following organ dysfunction criteria present within 6 hours of suspected infection and SIRS criteria that are NOT considered to be chronic conditions? (!)  Yes  -SA        Organ dysfunction Creatinine > 2 0 mg/dL  -SA        Date of presentation of severe sepsis 12/09/18  -SA        Time of presentation of severe sepsis 1358  -SA        Tissue hypoperfusion persists in the hour after crystalloid fluid administration, evidenced, by either:          Was hypotension present within one hour of the conclusion of crystalloid fluid administration?           Date of presentation of septic shock          Time of presentation of septic shock            User Key  (r) = Recorded By, (t) = Taken By, (c) = Cosigned By    Initials Name Provider Type    SA Renan Salazar PA-C Physician Assistant

## 2018-12-09 NOTE — ASSESSMENT & PLAN NOTE
· History of CAD on ASA/Plavix with prior history in Care Everywhere   · ECHO reviewed from June 2017 revealed EF 55%  · Will hold antihypertensives including Norvasc/Zestril/Lopressor and closely monitor BP

## 2018-12-09 NOTE — ED NOTES
Attempted iv and labs, successful with labs, unsuccessful with IV access     Zaid Shepard RN  12/09/18 2923

## 2018-12-09 NOTE — ASSESSMENT & PLAN NOTE
· Doubtful of infectious etiology as no fevers, chills or symptoms concerning for such  · Believe stress related  · Closely monitor temps as well as WBC count off antibiotics  · CT A/P obtained within the ER Main pancreatic duct appears focally dilated at the head  Recommending follow-up with nonemergent MRCP or ERCP for further evaluation  Colonic diverticulosis without acute diverticulitis  Stable hyperdense left renal lesion

## 2018-12-09 NOTE — ED NOTES
Aware he is going to be admitted, call bell within reach, bed low position    Aware urine specimen needed     Glenroy Boland RN  12/09/18 7062

## 2018-12-09 NOTE — ED NOTES
Per KANE Werner patient to go to step down level 2  PA to enter admit orders       Huang Roberts RN  12/09/18 5361

## 2018-12-09 NOTE — ED NOTES
C/O non-radiating substernal cp, denies OMERO chang    [facility] obtaining EKG     Laveda Netter, [place]  12/09/18 1445

## 2018-12-10 ENCOUNTER — APPOINTMENT (INPATIENT)
Dept: RADIOLOGY | Facility: HOSPITAL | Age: 78
DRG: 377 | End: 2018-12-10
Payer: COMMERCIAL

## 2018-12-10 ENCOUNTER — ANESTHESIA (INPATIENT)
Dept: GASTROENTEROLOGY | Facility: HOSPITAL | Age: 78
DRG: 377 | End: 2018-12-10
Payer: COMMERCIAL

## 2018-12-10 ENCOUNTER — APPOINTMENT (INPATIENT)
Dept: NON INVASIVE DIAGNOSTICS | Facility: HOSPITAL | Age: 78
DRG: 377 | End: 2018-12-10
Payer: COMMERCIAL

## 2018-12-10 PROBLEM — E87.2 LACTIC ACIDOSIS: Status: RESOLVED | Noted: 2018-12-09 | Resolved: 2018-12-10

## 2018-12-10 LAB
ALBUMIN SERPL BCP-MCNC: 2.6 G/DL (ref 3.5–5)
ALP SERPL-CCNC: 49 U/L (ref 46–116)
ALT SERPL W P-5'-P-CCNC: 20 U/L (ref 12–78)
ANION GAP SERPL CALCULATED.3IONS-SCNC: 11 MMOL/L (ref 4–13)
AST SERPL W P-5'-P-CCNC: 17 U/L (ref 5–45)
ATRIAL RATE: 394 BPM
ATRIAL RATE: 69 BPM
ATRIAL RATE: 79 BPM
ATRIAL RATE: 81 BPM
ATRIAL RATE: 85 BPM
ATRIAL RATE: 92 BPM
BASOPHILS # BLD AUTO: 0.07 THOUSANDS/ΜL (ref 0–0.1)
BASOPHILS NFR BLD AUTO: 1 % (ref 0–1)
BILIRUB SERPL-MCNC: 0.3 MG/DL (ref 0.2–1)
BUN SERPL-MCNC: 42 MG/DL (ref 5–25)
CALCIUM SERPL-MCNC: 8.5 MG/DL (ref 8.3–10.1)
CHLORIDE SERPL-SCNC: 108 MMOL/L (ref 100–108)
CO2 SERPL-SCNC: 21 MMOL/L (ref 21–32)
CREAT SERPL-MCNC: 1.75 MG/DL (ref 0.6–1.3)
EOSINOPHIL # BLD AUTO: 0.22 THOUSAND/ΜL (ref 0–0.61)
EOSINOPHIL NFR BLD AUTO: 2 % (ref 0–6)
ERYTHROCYTE [DISTWIDTH] IN BLOOD BY AUTOMATED COUNT: 14.6 % (ref 11.6–15.1)
EST. AVERAGE GLUCOSE BLD GHB EST-MCNC: 203 MG/DL
GFR SERPL CREATININE-BSD FRML MDRD: 36 ML/MIN/1.73SQ M
GLUCOSE SERPL-MCNC: 105 MG/DL (ref 65–140)
GLUCOSE SERPL-MCNC: 106 MG/DL (ref 65–140)
GLUCOSE SERPL-MCNC: 132 MG/DL (ref 65–140)
GLUCOSE SERPL-MCNC: 150 MG/DL (ref 65–140)
HBA1C MFR BLD: 8.7 % (ref 4.2–6.3)
HCT VFR BLD AUTO: 26.2 % (ref 36.5–49.3)
HGB BLD-MCNC: 8.8 G/DL (ref 12–17)
HGB BLD-MCNC: 9.4 G/DL (ref 12–17)
HGB BLD-MCNC: 9.6 G/DL (ref 12–17)
IMM GRANULOCYTES # BLD AUTO: 0.22 THOUSAND/UL (ref 0–0.2)
IMM GRANULOCYTES NFR BLD AUTO: 2 % (ref 0–2)
LYMPHOCYTES # BLD AUTO: 2.41 THOUSANDS/ΜL (ref 0.6–4.47)
LYMPHOCYTES NFR BLD AUTO: 17 % (ref 14–44)
MCH RBC QN AUTO: 34.2 PG (ref 26.8–34.3)
MCHC RBC AUTO-ENTMCNC: 33.6 G/DL (ref 31.4–37.4)
MCV RBC AUTO: 102 FL (ref 82–98)
MONOCYTES # BLD AUTO: 1.01 THOUSAND/ΜL (ref 0.17–1.22)
MONOCYTES NFR BLD AUTO: 7 % (ref 4–12)
NEUTROPHILS # BLD AUTO: 10.37 THOUSANDS/ΜL (ref 1.85–7.62)
NEUTS SEG NFR BLD AUTO: 71 % (ref 43–75)
NRBC BLD AUTO-RTO: 0 /100 WBCS
P AXIS: 109 DEGREES
P AXIS: 115 DEGREES
P AXIS: 79 DEGREES
P AXIS: 85 DEGREES
P AXIS: 97 DEGREES
PLATELET # BLD AUTO: 212 THOUSANDS/UL (ref 149–390)
PMV BLD AUTO: 11 FL (ref 8.9–12.7)
POTASSIUM SERPL-SCNC: 3.7 MMOL/L (ref 3.5–5.3)
PR INTERVAL: 218 MS
PR INTERVAL: 230 MS
PR INTERVAL: 246 MS
PR INTERVAL: 246 MS
PROCALCITONIN SERPL-MCNC: <0.05 NG/ML
PROT SERPL-MCNC: 5.5 G/DL (ref 6.4–8.2)
QRS AXIS: -12 DEGREES
QRS AXIS: -40 DEGREES
QRS AXIS: -48 DEGREES
QRS AXIS: -88 DEGREES
QRS AXIS: -89 DEGREES
QRS AXIS: 15 DEGREES
QRSD INTERVAL: 72 MS
QRSD INTERVAL: 78 MS
QRSD INTERVAL: 84 MS
QRSD INTERVAL: 84 MS
QRSD INTERVAL: 86 MS
QRSD INTERVAL: 86 MS
QT INTERVAL: 288 MS
QT INTERVAL: 356 MS
QT INTERVAL: 370 MS
QT INTERVAL: 388 MS
QT INTERVAL: 388 MS
QT INTERVAL: 412 MS
QTC INTERVAL: 413 MS
QTC INTERVAL: 415 MS
QTC INTERVAL: 440 MS
QTC INTERVAL: 444 MS
QTC INTERVAL: 478 MS
QTC INTERVAL: 490 MS
RBC # BLD AUTO: 2.57 MILLION/UL (ref 3.88–5.62)
SODIUM SERPL-SCNC: 140 MMOL/L (ref 136–145)
T WAVE AXIS: -22 DEGREES
T WAVE AXIS: 101 DEGREES
T WAVE AXIS: 133 DEGREES
T WAVE AXIS: 61 DEGREES
T WAVE AXIS: 65 DEGREES
T WAVE AXIS: 87 DEGREES
TROPONIN I SERPL-MCNC: 0.27 NG/ML
TROPONIN I SERPL-MCNC: 0.32 NG/ML
TROPONIN I SERPL-MCNC: 0.33 NG/ML
TROPONIN I SERPL-MCNC: 0.35 NG/ML
TROPONIN I SERPL-MCNC: 0.35 NG/ML
TROPONIN I SERPL-MCNC: 0.4 NG/ML
VENTRICULAR RATE: 114 BPM
VENTRICULAR RATE: 124 BPM
VENTRICULAR RATE: 69 BPM
VENTRICULAR RATE: 79 BPM
VENTRICULAR RATE: 81 BPM
VENTRICULAR RATE: 85 BPM
WBC # BLD AUTO: 14.3 THOUSAND/UL (ref 4.31–10.16)

## 2018-12-10 PROCEDURE — 85018 HEMOGLOBIN: CPT | Performed by: PHYSICIAN ASSISTANT

## 2018-12-10 PROCEDURE — 94640 AIRWAY INHALATION TREATMENT: CPT

## 2018-12-10 PROCEDURE — 94660 CPAP INITIATION&MGMT: CPT

## 2018-12-10 PROCEDURE — 71045 X-RAY EXAM CHEST 1 VIEW: CPT

## 2018-12-10 PROCEDURE — 93010 ELECTROCARDIOGRAM REPORT: CPT | Performed by: INTERNAL MEDICINE

## 2018-12-10 PROCEDURE — 43255 EGD CONTROL BLEEDING ANY: CPT | Performed by: INTERNAL MEDICINE

## 2018-12-10 PROCEDURE — 83036 HEMOGLOBIN GLYCOSYLATED A1C: CPT | Performed by: NURSE PRACTITIONER

## 2018-12-10 PROCEDURE — 82948 REAGENT STRIP/BLOOD GLUCOSE: CPT

## 2018-12-10 PROCEDURE — 84145 PROCALCITONIN (PCT): CPT | Performed by: HOSPITALIST

## 2018-12-10 PROCEDURE — C9113 INJ PANTOPRAZOLE SODIUM, VIA: HCPCS | Performed by: PHYSICIAN ASSISTANT

## 2018-12-10 PROCEDURE — 84484 ASSAY OF TROPONIN QUANT: CPT | Performed by: PHYSICIAN ASSISTANT

## 2018-12-10 PROCEDURE — 84484 ASSAY OF TROPONIN QUANT: CPT | Performed by: NURSE PRACTITIONER

## 2018-12-10 PROCEDURE — 85025 COMPLETE CBC W/AUTO DIFF WBC: CPT | Performed by: HOSPITALIST

## 2018-12-10 PROCEDURE — 99222 1ST HOSP IP/OBS MODERATE 55: CPT | Performed by: INTERNAL MEDICINE

## 2018-12-10 PROCEDURE — 99232 SBSQ HOSP IP/OBS MODERATE 35: CPT | Performed by: HOSPITALIST

## 2018-12-10 PROCEDURE — 94760 N-INVAS EAR/PLS OXIMETRY 1: CPT

## 2018-12-10 PROCEDURE — 94762 N-INVAS EAR/PLS OXIMTRY CONT: CPT

## 2018-12-10 PROCEDURE — 93005 ELECTROCARDIOGRAM TRACING: CPT

## 2018-12-10 PROCEDURE — 84484 ASSAY OF TROPONIN QUANT: CPT | Performed by: HOSPITALIST

## 2018-12-10 PROCEDURE — 80053 COMPREHEN METABOLIC PANEL: CPT | Performed by: HOSPITALIST

## 2018-12-10 PROCEDURE — P9021 RED BLOOD CELLS UNIT: HCPCS

## 2018-12-10 RX ORDER — ALBUTEROL SULFATE 2.5 MG/3ML
2.5 SOLUTION RESPIRATORY (INHALATION) EVERY 4 HOURS PRN
Status: DISCONTINUED | OUTPATIENT
Start: 2018-12-10 | End: 2018-12-16 | Stop reason: HOSPADM

## 2018-12-10 RX ORDER — GLYCOPYRROLATE 0.2 MG/ML
INJECTION INTRAMUSCULAR; INTRAVENOUS AS NEEDED
Status: DISCONTINUED | OUTPATIENT
Start: 2018-12-10 | End: 2018-12-10 | Stop reason: SURG

## 2018-12-10 RX ORDER — LEVALBUTEROL 1.25 MG/.5ML
1.25 SOLUTION, CONCENTRATE RESPIRATORY (INHALATION) EVERY 8 HOURS PRN
Status: DISCONTINUED | OUTPATIENT
Start: 2018-12-10 | End: 2018-12-10

## 2018-12-10 RX ORDER — NITROGLYCERIN 0.4 MG/1
TABLET SUBLINGUAL
Status: COMPLETED
Start: 2018-12-10 | End: 2018-12-10

## 2018-12-10 RX ORDER — SODIUM CHLORIDE FOR INHALATION 0.9 %
3 VIAL, NEBULIZER (ML) INHALATION
Status: DISCONTINUED | OUTPATIENT
Start: 2018-12-10 | End: 2018-12-16 | Stop reason: HOSPADM

## 2018-12-10 RX ORDER — LEVALBUTEROL 1.25 MG/.5ML
1.25 SOLUTION, CONCENTRATE RESPIRATORY (INHALATION)
Status: DISCONTINUED | OUTPATIENT
Start: 2018-12-10 | End: 2018-12-16 | Stop reason: HOSPADM

## 2018-12-10 RX ORDER — FUROSEMIDE 10 MG/ML
20 INJECTION INTRAMUSCULAR; INTRAVENOUS ONCE
Status: COMPLETED | OUTPATIENT
Start: 2018-12-10 | End: 2018-12-10

## 2018-12-10 RX ORDER — NITROGLYCERIN 0.4 MG/1
0.4 TABLET SUBLINGUAL
Status: DISCONTINUED | OUTPATIENT
Start: 2018-12-10 | End: 2018-12-16 | Stop reason: HOSPADM

## 2018-12-10 RX ORDER — PROPOFOL 10 MG/ML
INJECTION, EMULSION INTRAVENOUS AS NEEDED
Status: DISCONTINUED | OUTPATIENT
Start: 2018-12-10 | End: 2018-12-10 | Stop reason: SURG

## 2018-12-10 RX ORDER — FUROSEMIDE 10 MG/ML
20 INJECTION INTRAMUSCULAR; INTRAVENOUS ONCE
Status: COMPLETED | OUTPATIENT
Start: 2018-12-11 | End: 2018-12-11

## 2018-12-10 RX ORDER — LIDOCAINE HYDROCHLORIDE 10 MG/ML
INJECTION, SOLUTION INFILTRATION; PERINEURAL AS NEEDED
Status: DISCONTINUED | OUTPATIENT
Start: 2018-12-10 | End: 2018-12-10 | Stop reason: SURG

## 2018-12-10 RX ADMIN — FUROSEMIDE 20 MG: 10 INJECTION, SOLUTION INTRAMUSCULAR; INTRAVENOUS at 20:14

## 2018-12-10 RX ADMIN — ISODIUM CHLORIDE 3 ML: 0.03 SOLUTION RESPIRATORY (INHALATION) at 13:24

## 2018-12-10 RX ADMIN — NITROGLYCERIN 0.4 MG: 0.4 TABLET SUBLINGUAL at 20:12

## 2018-12-10 RX ADMIN — NITROGLYCERIN 0.4 MG: 0.4 TABLET SUBLINGUAL at 21:03

## 2018-12-10 RX ADMIN — SODIUM CHLORIDE 8 MG/HR: 9 INJECTION, SOLUTION INTRAVENOUS at 00:49

## 2018-12-10 RX ADMIN — NITROGLYCERIN 0.4 MG: 0.4 TABLET SUBLINGUAL at 23:34

## 2018-12-10 RX ADMIN — SODIUM CHLORIDE 125 ML/HR: 0.9 INJECTION, SOLUTION INTRAVENOUS at 16:05

## 2018-12-10 RX ADMIN — PROPOFOL 20 MG: 10 INJECTION, EMULSION INTRAVENOUS at 14:31

## 2018-12-10 RX ADMIN — SODIUM CHLORIDE: 0.9 INJECTION, SOLUTION INTRAVENOUS at 14:19

## 2018-12-10 RX ADMIN — LEVALBUTEROL HYDROCHLORIDE 1.25 MG: 1.25 SOLUTION, CONCENTRATE RESPIRATORY (INHALATION) at 19:16

## 2018-12-10 RX ADMIN — NITROGLYCERIN 1 INCH: 20 OINTMENT TOPICAL at 06:43

## 2018-12-10 RX ADMIN — PROPOFOL 20 MG: 10 INJECTION, EMULSION INTRAVENOUS at 14:29

## 2018-12-10 RX ADMIN — INSULIN LISPRO 1 UNITS: 100 INJECTION, SOLUTION INTRAVENOUS; SUBCUTANEOUS at 05:57

## 2018-12-10 RX ADMIN — SODIUM CHLORIDE 8 MG/HR: 9 INJECTION, SOLUTION INTRAVENOUS at 10:14

## 2018-12-10 RX ADMIN — METOPROLOL TARTRATE 12.5 MG: 25 TABLET ORAL at 21:48

## 2018-12-10 RX ADMIN — PROPOFOL 20 MG: 10 INJECTION, EMULSION INTRAVENOUS at 14:35

## 2018-12-10 RX ADMIN — BUDESONIDE AND FORMOTEROL FUMARATE DIHYDRATE 2 PUFF: 160; 4.5 AEROSOL RESPIRATORY (INHALATION) at 18:12

## 2018-12-10 RX ADMIN — LIDOCAINE HYDROCHLORIDE ANHYDROUS 50 MG: 10 INJECTION, SOLUTION INFILTRATION at 14:19

## 2018-12-10 RX ADMIN — LEVALBUTEROL HYDROCHLORIDE 1.25 MG: 1.25 SOLUTION, CONCENTRATE RESPIRATORY (INHALATION) at 06:57

## 2018-12-10 RX ADMIN — GLYCOPYRROLATE 0.2 MG: 0.2 INJECTION, SOLUTION INTRAMUSCULAR; INTRAVENOUS at 14:36

## 2018-12-10 RX ADMIN — SODIUM CHLORIDE, SODIUM GLUCONATE, SODIUM ACETATE, POTASSIUM CHLORIDE, MAGNESIUM CHLORIDE, SODIUM PHOSPHATE, DIBASIC, AND POTASSIUM PHOSPHATE 75 ML/HR: .53; .5; .37; .037; .03; .012; .00082 INJECTION, SOLUTION INTRAVENOUS at 05:49

## 2018-12-10 RX ADMIN — PROPOFOL 20 MG: 10 INJECTION, EMULSION INTRAVENOUS at 14:27

## 2018-12-10 RX ADMIN — ISODIUM CHLORIDE 3 ML: 0.03 SOLUTION RESPIRATORY (INHALATION) at 06:57

## 2018-12-10 RX ADMIN — MORPHINE SULFATE 2 MG: 2 INJECTION, SOLUTION INTRAMUSCULAR; INTRAVENOUS at 19:49

## 2018-12-10 RX ADMIN — LEVALBUTEROL HYDROCHLORIDE 1.25 MG: 1.25 SOLUTION, CONCENTRATE RESPIRATORY (INHALATION) at 13:24

## 2018-12-10 RX ADMIN — PROPOFOL 40 MG: 10 INJECTION, EMULSION INTRAVENOUS at 14:25

## 2018-12-10 RX ADMIN — ISODIUM CHLORIDE 3 ML: 0.03 SOLUTION RESPIRATORY (INHALATION) at 19:16

## 2018-12-10 NOTE — ASSESSMENT & PLAN NOTE
· History of CKD appears unspecified; however Stage III based on GFR  · Improving with IVF   · Will hold ACEI  · Avoid nephrotoxic agents  · Monitor U/O as well as renal function

## 2018-12-10 NOTE — ANESTHESIA POSTPROCEDURE EVALUATION
Post-Op Assessment Note      CV Status:  Stable    Mental Status:  Alert and awake    Hydration Status:  Euvolemic    PONV Controlled:  Controlled    Airway Patency:  Patent    Post Op Vitals Reviewed: Yes          Staff: Anesthesiologist, CRNA       Comments: appears A-fib on monitor; endorsed brief edpisode of nausea; EKG now; 6L NC sats 93%          BP   125/64   Temp      Pulse  112   Resp  22   SpO2   93

## 2018-12-10 NOTE — PROGRESS NOTES
Around 02:10 pt complained of chest pain that per pt started around 02:00  Pain/pressure was constant and was radiating to his back  Pain was slightly worse when putting slight pressure with fingers on pt's chest  By 02:15 pain slowly subsided  Notified ANDREINA Lennon about chest pain and was advised to obtain repeat 12 lead EKG with the onset of new chest pain  Pt was asked to call right away if he experiences new onset of chest pain  Will continue to monitor

## 2018-12-10 NOTE — PROGRESS NOTES
Around 06:00am on 12/10 pt complained of chest pain  Notified Daron Balbuena PA-C about chest pain and was advised to obtain repeat 12 lead EKG  Chest x-ray was done at bedside and nitroglycerin (NITRO-BID) 2 % TD ointment 1 inch applied to ACW  Pt also was experiencing hypoxemia, respiratory came to evaluate pt and put him on non-re breather for short duration to get O2 regulated    Respiratory protocol was initiated at this time

## 2018-12-10 NOTE — PROGRESS NOTES
Followed up on repeat labs  Hemoglobin noted to drop approximately 3 g from 9 8-7 2  Called HELEN Mercado  Hemodynamics were stable  No further bright red blood per rectum  Repeat hemoglobin ordered  Of note it was drawn from the same side the patient's IV fluids were running however these were placed on hold  Will repeat from the opposite side  Will place 2 large-bore IVs    RN called me back approximately 3 minutes later stating that the patient was now complaining of mid sternal chest pain  EKG was ordered  I did go evaluate the patient and at that time he was chest pain-free  EKG without acute ST changes  Troponin up trending in repeat pending  On re-evaluation of labs hemoglobin was noted to be 8 4  Will hold on transfusions at this time and continue serial monitoring

## 2018-12-10 NOTE — ASSESSMENT & PLAN NOTE
· Suspected GI bleed in setting of Melena as well as episodes of BRBPR  · Hgb (May 2018) 12 9--> 9 8 (on admission) now 8 8  · Cont protonix gtt  · On daily ASA/Plavix, will hold in setting of suspected GI bleeding  · Monitor Hgb q6h, would consider transfusion for <8 0 in setting of lateral ischemic EKG changes while in ED which improved post nitroglycerin administration   · Obtain type and screen  · Will transfuse given chest pain as below   · GI consult, discussed with Dr Sebastian Olson, team aware

## 2018-12-10 NOTE — UTILIZATION REVIEW
Initial Clinical Review    Admission: Date/Time/Statement: 12/9/18 @ 1445     Orders Placed This Encounter   Procedures    Inpatient Admission (expected length of stay for this patient is greater than two midnights)     Standing Status:   Standing     Number of Occurrences:   1     Order Specific Question:   Admitting Physician     Answer:   Henny Noel     Order Specific Question:   Level of Care     Answer:   Level 2 Stepdown / HOT [14]     Order Specific Question:   Estimated length of stay     Answer:   More than 2 Midnights     Order Specific Question:   Certification     Answer:   I certify that inpatient services are medically necessary for this patient for a duration of greater than two midnights  See H&P and MD Progress Notes for additional information about the patient's course of treatment  ED: Date/Time/Mode of Arrival:   ED Arrival Information     Expected Arrival Acuity Means of Arrival Escorted By Service Admission Type    - 12/9/2018 10:04 Urgent Walk-In Family Member Hospitalist Urgent    Arrival Complaint    Blood In Stool          Chief Complaint:   Chief Complaint   Patient presents with    Black or Bloody Stool     Pt reports having blood in his stool beginning yesterday  Pt reports bright red blood when wiping and filling up the topilet bowl  Pt denies abd pain, N/V, reports taking plavix and asa daily  History of Illness: 66 y o  male who presented to [facility] ER from home with the above chief complaint  Patient reports since Friday several daily bowel movements of black stools with at times bright red blood which does change the toilet water color  Patient reports having prior colonoscopies which revealed polyps; however no other concerns    He denies frequent use of NSAIDs, additionally denies  fevers, chills, abdominal pains, nausea or vomiting        Patients PMH most significant for COPD, CAD, PVD as well as Angioplasty and stenting to the bilateral lower extremities, maintained on ASA and Plavix        Workup in the ER included labs revealing sodium 137, potassium 4 2, chloride 102, bicarb 22, BUN 68 increased from prior May 2018 of 30, creatinine 2 08 which is increased from 1 61 also May of 2018  Baseline creatinine does appear to be consistent with stage III with a GFR of 34-38  LFTs normal except for elevated lipase of 422  CBC reveals a white blood cell count of 16, no bandemia, hemoglobin 9 8 which is decreased from May 2018 of 12 6, platelets 848       Patient while being assessed in the emergency department complained of significant sudden onset of chest discomfort  Repeat 12 lead EKG was obtained which was changed from his initial presenting EKG revealing lateral ST segment depressions as well as T-wave inversions  Patient was provided 1 sublingual nitroglycerin patient post administration completely pain free with resolution of ST segment depressions  On 12/10- complaints of chest pain  Nitroglycerin ointment applied to ACW  Sat 77% and patient placed on NRM  ED Vital Signs:   ED Triage Vitals [12/09/18 1015]   Temperature Pulse Respirations Blood Pressure SpO2   97 5 °F (36 4 °C) 80 (!) 24 140/63 97 %      Temp Source Heart Rate Source Patient Position - Orthostatic VS BP Location FiO2 (%)   Oral Monitor Sitting Right arm --      Pain Score       No Pain        Wt Readings from Last 1 Encounters:   12/10/18 65 8 kg (145 lb)       Vital Signs (abnormal): low /48  Exam epigastric tenderness    Abnormal Labs/Diagnostic Test Results:   UA 1/10% glucose  Lactic acid 3 1    Bun 68  Creatinine 2 08  Glucose 203  Albumin 3 2     Lipase 422  Wbc 16 14   hgb 9 8, hct 29 5    Ct abdomen - Limited exam without IV and oral contrast   1   Main pancreatic duct appears focally dilated at the head   Recommend follow-up with nonemergent MRCP or ERCP for further evaluation to exclude underlying lesion   No obvious findings of pancreatitis based on this limited exam   2   Colonic diverticulosis without acute diverticulitis  3   Stable hyperdense left renal lesion   Recommend follow-up with nonemergent ultrasound    CxR- Asymmetric density noted in the right hilar region   Question focal consolidation or mass    2256 troponin 0 24  0452 troponin 0 35    12/10/2018-  Wbc 14 30  hgb 8 8,, hct 26 2    Bun 42  Creatinine 1 75  Total protein 5 5  Albumin 2 6  ED Treatment: blood cultures     Medication Administration from 12/09/2018 1004 to 12/09/2018 1525       Date/Time Order Dose Route Action Comments     12/09/2018 1247 sodium chloride 0 9 % infusion 0 mL/hr Intravenous Stopped      12/09/2018 1042 sodium chloride 0 9 % infusion 125 mL/hr Intravenous New Bag      12/09/2018 1220 pantoprazole (PROTONIX) 80 mg in sodium chloride 0 9 % 100 mL IVPB 0 mg Intravenous Stopped      12/09/2018 1202 pantoprazole (PROTONIX) 80 mg in sodium chloride 0 9 % 100 mL IVPB 80 mg Intravenous New Bag      12/09/2018 1345 sodium chloride 0 9 % bolus 1,000 mL 0 mL Intravenous Stopped      12/09/2018 1247 sodium chloride 0 9 % bolus 1,000 mL 1,000 mL Intravenous New Bag      12/09/2018 1415 sodium chloride 0 9 % bolus 1,000 mL 0 mL Intravenous Stopped      12/09/2018 1346 sodium chloride 0 9 % bolus 1,000 mL 1,000 mL Intravenous New Bag      12/09/2018 1431 sodium chloride 0 9 % bolus 1,000 mL 0 mL Intravenous Stopped      12/09/2018 1416 sodium chloride 0 9 % bolus 1,000 mL 500 mL Intravenous New Bag 109 mls to infuse per order     12/09/2018 1413 nitroglycerin (NITROSTAT) SL tablet 0 4 mg 0 4 mg Sublingual Given pain 8/10, bp 145/60          Past Medical/Surgical History:   Past Medical History:   Diagnosis Date    BPH (benign prostatic hyperplasia)     CKD (chronic kidney disease)     [facility]     Coronary artery disease     Diabetes mellitus ([facility] )     History of heart attack     Hyperlipidemia     Hypertension     PAD (peripheral artery disease) ([facility])        Admitting Diagnosis: Blood in stool [K92 1]  Unstable angina ([facility]) [I20 0]  Melena [K92 1]  GI bleed [K92 2]  SIRS (systemic inflammatory response syndrome) ([facility]) [R65 10]  Acute kidney injury ([facility] ) [N17 9]    Age/Sex: 66 y o  male    Assessment/Plan: 40-year-old male presenting with 2 days of dark tarry stool and episode of bright red blood per rectum  Patient denies excessive NSAID use  He also denies any epigastric pain or discomfort, no nausea or vomiting  While in the ED patient had 1 episode of mild substernal chest pain that resolved with nitroglycerin  An EKG at that time also showed possible lateral ischemia, on 12/10 with increasing troponin and cardiology consulted  Hemoglobin greater than 9 at this point will continue to monitor and consider transfusion for hemoglobin less than 8  Agree with step-down monitoring with telemetry  Trend troponins consider additional evaluation prior to EGD if patient were to develop repeat symptoms  ASA and plavix are on hold  Patient is consented for blood if needed  Will continue PPI drip; appreciate GI input  Mild NIDHI likely 2/2 anemia; ACEi and other anti-hypertensives on hold in the setting of active GI bleed   CT findings noted and will need follow-up on discharge      Admission Orders:  12/9/2018  1445 INPATIENT   Scheduled Meds:   Current Facility-Administered Medications:  albuterol 2 5 mg Nebulization Q4H PRN    budesonide-formoterol 2 puff Inhalation BID    insulin lispro 1-5 Units Subcutaneous Q6H [facility]    levalbuterol 1 25 mg Nebulization TID    multi-electrolyte 75 mL/hr Intravenous Continuous Last Rate: 75 mL/hr (12/10/18 0549)   nicotine 14 mg Transdermal Daily    pantoprozole (PROTONIX) infusion (Continuous) 8 mg/hr Intravenous Continuous Last Rate: 8 mg/hr (12/10/18 0049)   sodium chloride 125 mL/hr Intravenous Continuous Last Rate: Stopped (12/09/18 1619)   sodium chloride 3 mL Nebulization TID      Continuous Infusions:   multi-electrolyte 75 mL/hr Last Rate: 75 mL/hr (12/10/18 0549)   pantoprozole (PROTONIX) infusion (Continuous) 8 mg/hr Last Rate: 8 mg/hr (12/10/18 0049)   sodium chloride 125 mL/hr Last Rate: Stopped (12/09/18 1619)     PRN Meds:   albuterol - not used  OTHER ORDERS: fingerstick glucose q 6h  scds  neuroc checks q shift  Cardio pulmonary monitoring   hgb q 6h  Serial troponin   Stool enteric bacterial panel, clostridium difficile  Consult cardiology, GI  Respiratory protocol - oxygen titrated to 6 liters  [address] Utilization Review Department  Phone: [phone]; Fax [phone]  [email or web address]  ATTENTION: Please call with any questions or concerns to [phone]  and carefully listen to the prompts so that you are directed to the right person  Send all requests for admission clinical reviews, approved or denied determinations and any other requests to fax [phone]   All voicemails are confidential

## 2018-12-10 NOTE — PLAN OF CARE
Problem: Potential for Falls  Goal: Patient will remain free of falls  INTERVENTIONS:  - Assess patient frequently for physical needs  -  Identify cognitive and physical deficits and behaviors that affect risk of falls    -  [place] fall precautions as indicated by assessment   - Educate patient/family on patient safety including physical limitations  - Instruct patient to call for assistance with activity based on assessment  - Modify environment to reduce risk of injury  - Consider OT/PT consult to assist with strengthening/mobility   Outcome: Progressing      Problem: Prexisting or High Potential for Compromised Skin Integrity  Goal: Skin integrity is maintained or improved  INTERVENTIONS:  - Identify patients at risk for skin breakdown  - Assess and monitor skin integrity  - Assess and monitor nutrition and hydration status  - Monitor labs (i e  albumin)  - Assess for incontinence   - Turn and reposition patient  - Assist with mobility/ambulation  - Relieve pressure over bony prominences  - Avoid friction and shearing  - Provide appropriate hygiene as needed including keeping skin clean and dry  - Evaluate need for skin moisturizer/barrier cream  - Collaborate with interdisciplinary team (i e  Nutrition, Rehabilitation, etc )   - Patient/family teaching   Outcome: Progressing      Problem: PAIN - ADULT  Goal: Verbalizes/displays adequate comfort level or baseline comfort level  Interventions:  - Encourage patient to monitor pain and request assistance  - Assess pain using appropriate pain scale  - Administer analgesics based on type and severity of pain and evaluate response  - Implement non-pharmacological measures as appropriate and evaluate response  - Consider cultural and social influences on pain and pain management  - Notify physician/advanced practitioner if interventions unsuccessful or patient reports new pain  Outcome: Progressing      Problem: SAFETY ADULT  Goal: Maintain or return to baseline ADL function  INTERVENTIONS:  -  Assess patient's ability to carry out ADLs; assess patient's baseline for ADL function and identify physical deficits which impact ability to perform ADLs (bathing, care of mouth/teeth, toileting, grooming, dressing, etc )  - Assess/evaluate cause of self-care deficits   - Assess range of motion  - Assess patient's mobility; develop plan if impaired  - Assess patient's need for assistive devices and provide as appropriate  - Encourage maximum independence but intervene and supervise when necessary  ¯ Involve family in performance of ADLs  ¯ Assess for home care needs following discharge   ¯ Request OT consult to assist with ADL evaluation and planning for discharge  ¯ Provide patient education as appropriate  Outcome: Progressing    Goal: Maintain or return mobility status to optimal level  INTERVENTIONS:  - Assess patient's baseline mobility status (ambulation, transfers, stairs, etc )    - Identify cognitive and physical deficits and behaviors that affect mobility  - Identify mobility aids required to assist with transfers and/or ambulation (gait belt, sit-to-stand, lift, walker, cane, etc )  - [place] fall precautions as indicated by assessment  - Record patient progress and toleration of activity level on Mobility SBAR; progress patient to next Phase/Stage  - Instruct patient to call for assistance with activity based on assessment  - Request Rehabilitation consult to assist with strengthening/weightbearing, etc   Outcome: Progressing      Problem: DISCHARGE PLANNING  Goal: Discharge to home or other facility with appropriate resources  INTERVENTIONS:  - Identify barriers to discharge w/patient and caregiver  - Arrange for needed discharge resources and transportation as appropriate  - Identify discharge learning needs (meds, wound care, etc )  - Refer to Case Management Department for coordinating discharge planning if the patient needs post-hospital services based on physician/advanced practitioner order or complex needs related to functional status, cognitive ability, or social support system  Outcome: Progressing      Problem: GASTROINTESTINAL - ADULT  Goal: Maintains or returns to baseline bowel function  INTERVENTIONS:  - Assess bowel function  - Encourage oral fluids to ensure adequate hydration  - Administer IV fluids as ordered to ensure adequate hydration  - Administer ordered medications as needed  - Encourage mobilization and activity  - Nutrition services referral to assist patient with appropriate food choices  Outcome: Progressing      Problem: METABOLIC, FLUID AND ELECTROLYTES - ADULT  Goal: Electrolytes maintained within normal limits  INTERVENTIONS:  - Monitor labs and assess patient for signs and symptoms of electrolyte imbalances  - Administer electrolyte replacement as ordered  - Monitor response to electrolyte replacements, including repeat lab results as appropriate  - Instruct patient on fluid and nutrition as appropriate  Outcome: Progressing    Goal: Fluid balance maintained  INTERVENTIONS:  - Monitor labs and assess for signs and symptoms of volume excess or deficit  - Monitor I/O and WT  - Instruct patient on fluid and nutrition as appropriate  Outcome: Progressing    Goal: Glucose maintained within target range  INTERVENTIONS:  - Monitor Blood Glucose as ordered  - Assess for signs and symptoms of hyperglycemia and hypoglycemia  - Administer ordered medications to maintain glucose within target range  - Assess nutritional intake and initiate nutrition service referral as needed  Outcome: Progressing

## 2018-12-10 NOTE — ASSESSMENT & PLAN NOTE
· 12 Lead EKG on presentation without lateral ischemic change  While in ED developed Chest pain with noted concern for lateral ischemia in setting of ST Segment depression with TWave inversion      · Patient administered 1SL nitro in ER, pain subsequently pain free, repeated 12 Lead with EKG resolution  · Suspect secondary to anemia vs unstable angina  · Will transfuse 1 unit   · On ASA/Plavix, will hold in setting of Melena / suspected GI Bleeding  · Troponin trend 0 03-->0 21-->0 35  · Appreciate cardiology input

## 2018-12-10 NOTE — CONSULTS
Consultation - General Cardiology   Donavan Cox 66 y o  male MRN: [id number]  Unit/Bed#: -01 Encounter: [id number]    Inpatient consult to Cardiology  Consult performed by: Rachna Jones ordered by: Tri Sawant        Chief Complaint   Patient presents with    Black or Bloody Stool       Pt reports having blood in his stool beginning yesterday  Pt reports bright red blood when wiping and filling up the topilet bowl  Pt denies abd pain, N/V, reports taking plavix and asa daily  Physician Requesting Consult: Munir Fofana MD  Reason for Consult / Principal Problem:  Angina      Assessment  Chest pain  Elevated troponin to 0 35-this is likely a type 2 non STEMI secondary to acute anemia  Melena-an EGD is planned by GI today  Acute anemia-hemoglobin last night around 7 was 7 2; baseline is around 12  5 in May 2018  Currently 8 8  He is on a Protonix drip receiving IV normal saline fluids  CAD, status post inferior MI x 2-he had underwent PTCA of a 90% LAD lesion in 1992  Sonia Plascencia Hypertension  Average blood pressure 141/64  As an outpatient on amlodipine 10 mg daily, lisinopril 40 mg daily metoprolol tartrate 25 mg q 12 hours  All held given acute anemia with melena  Hyperlipidemia-on pravastatin 40 mg daily  His LDL in October was 62, non HDL 81  Diabetes mellitus, type 2; unclear of his diabetic control  Will order hemoglobin A1c, if not already done  COPD/emphysema  CKD, stage III  Baseline creatinine appears to be 1 7 to 2 0; currently 1 75  PVD   · status post bilateral iliac stenting September 2014  · Chronically on aspirin 81 and Plavix 75 mg daily; all held given acute anemia and melena  · Right carotid artery stenosis-70%; left carotid artery stenosis-less than 50%, asymptomatic  Follows with [facility] vascular surgery as an outpatient   Tobacco abuse-advised and counseled to quit  He tells me he is ready to quit    He had a nicotine patch has been ordered  Allergy to an unknown contrast    Plan/discussion  Would agree with transfusing a unit of packed cells  Restart his beta-blocker at a low dose  Would agree with EGD  Once he is medically stable, he will benefit from an ischemic evaluation  Optimize his risk factors-check hemoglobin A1c and lipid profile  Restart aspirin, when able from a GI standpoint  Advise complete smoking cessation  From a cardiovascular standpoint, he would benefit from adding either liraglutide  (Victoza), canagliflozin  ( Invokana) or empagliflozin)  jardiance) for his diabetic ontrol  Will get an echocardiogram to assess for wall motion abnormalities and LV function      He will continue to follow with [facility] once discharged      History of Present Illness   HPI: Lamonte Reese is a 66y o  year old male who has a history of coronary disease  He also has peripheral vascular disease with prior bilateral lower extremity iliac stenting in 2014  He is a known diabetic, who continues to smoke  He has been diagnosed as having emphysema  He has treated hypertension, CKD ( baseline Cr 1 7-2 0) and hyperlipidemia; he follows with cardiologist Dr Sabra Riedel MD/[facility]  In 1992 he reportedly presented with an M I  He underwent PTCA of a 90% LAD lesion  I do not have the cardiac catheterization report  In June 2017 he underwent a Lexiscan Myoview demonstrating no ischemia  TID ratio was 1  13  An echocardiogram at that time demonstrated a preserved ejection fraction, without wall motion abnormality and no significant valve disease  He resides with his son  He reports at baseline he does have some dyspnea on exertion  He does not wear oxygen  He presents with 2 days of melena as well as an episode of bright red blood on the toilet paper  His hemoglobin was low as 7 2, baseline 12 5  He has been ordered some blood  While hospitalized he has had 2 episodes of chest pain for which Cardiology is consulted    The last he reports this morning around 6:00 a m, across his anterior chest, lasting 15 min, improved with Nitro-Bid ointment  His initial troponin was 0 03 and has increased to 0 35; is not yet peaked  His EKGs have shown sinus rhythm with first-degree AV block  There is a prior inferior MI and anterior MI, similar to a prior report, from 6/5/17 by Dr Rosa Martinez has been consulted and an EGD is anticipated for today  EKG reviewed personally:   Sinus rhythm with first-degree AV block  Nonspecific ST T wave changes most pronounced inferolaterally    Telemetry reviewed personally:  Sinus rhythm, average rate 70s  Occasional PVCs; AIVR    Per Care everywhere:  Shaye Wilcox June 2017:  No ischemia  TID ratio 1 13  Normal ventricular size and ejection fraction    Echocardiogram June 2017:  Ejection fraction 55%  There was mild concentric left ventricular hypertrophy  There was normal regional wall motion  Review of Systems   Constitution: Negative for chills and weakness  Cardiovascular: Positive for chest pain  Negative for claudication, cyanosis, dyspnea on exertion, irregular heartbeat, leg swelling, near-syncope, orthopnea, palpitations, paroxysmal nocturnal dyspnea and syncope  See HPI   Respiratory: Negative for cough and shortness of breath  Gastrointestinal: Negative for heartburn and nausea  Neurological: Negative for dizziness, focal weakness, headaches and light-headedness  All other systems reviewed and are negative      Historical Information   Past Medical History:   Diagnosis Date    BPH (benign prostatic hyperplasia)     CKD (chronic kidney disease)     COPD (chronic obstructive pulmonary disease) (HCC)     Coronary artery disease     [facility] mellitus [facility] [address] [facility]     History of heart attack     Hyperlipidemia     Hypertension     [facility]      Past Surgical History:   Procedure Laterality Date    APPENDECTOMY      BACK SURGERY      x2 - lumbar spine    VASCULAR SURGERY      stent     History   Alcohol Use No     History   Drug Use No     History   Smoking Status    Current Every Day Smoker    Packs/day: 0 50    Years: 60 00   Smokeless Tobacco    Never Used     Comment: heavy tobacco smoker per [employer]     Family History: History reviewed  No pertinent family history  Meds/Allergies   all current active meds have been reviewed and current meds:   Current Facility-Administered Medications   Medication Dose Route Frequency    albuterol inhalation solution 2 5 mg  2 5 mg Nebulization Q4H PRN    budesonide-formoterol (SYMBICORT) 160-4 5 mcg/act inhaler 2 puff  2 puff Inhalation BID    insulin lispro (HumaLOG) 100 units/mL subcutaneous injection 1-5 Units  1-5 Units Subcutaneous Q6H [facility]    levalbuterol (XOPENEX) inhalation solution 1 25 mg  1 25 mg Nebulization TID    multi-electrolyte (ISOLYTE-S PH 7 4 equivalent) IV solution  75 mL/hr Intravenous Continuous    nicotine (NICODERM CQ) 14 mg/24hr TD 24 hr patch 14 mg  14 mg Transdermal Daily    pantoprazole (PROTONIX) 80 mg in sodium chloride 0 9 % 100 mL infusion  8 mg/hr Intravenous Continuous    sodium chloride 0 9 % infusion  125 mL/hr Intravenous Continuous    sodium chloride 0 9 % inhalation solution 3 mL  3 mL Nebulization TID       multi-electrolyte 75 mL/hr Last Rate: 75 mL/hr (12/10/18 0556)   pantoprozole (PROTONIX) infusion (Continuous) 8 mg/hr Last Rate: 8 mg/hr (12/10/18 0049)   sodium chloride 125 mL/hr Last Rate: Stopped (18 1619)       Allergies   Allergen Reactions    Contrast [Iodinated Diagnostic Agents]        Objective   Vitals: Blood pressure 120/60, pulse 78, temperature 98 2 °F (36 8 °C), temperature source Oral, resp  rate 20, height 5' 2" (1 575 m), weight 65 8 kg (145 lb), SpO2 94 %  ,     Body mass index is 26 52 kg/m²  ,     Systolic (53EIV), WQH:686 , Min:108 , LRU:620     Diastolic (50GDQ), RI, Min:48, Max:80            Intake/Output Summary (Last 24 hours) at 12/10/18 0820  Last data filed at 12/10/18 0701   Gross per 24 hour   Intake          3461 33 ml   Output             2050 ml   Net          1411 33 ml     Weight (last 2 days)     Date/Time   Weight    12/10/18 0555  65 8 (145)    12/09/18 1528  69 2 (152 56)    12/09/18 1457  69 2 (152 56)    12/09/18 1015  70 3 (154 98)            Invasive Devices     Peripheral Intravenous Line            Peripheral IV 12/09/18 Left Arm less than 1 day    Peripheral IV 12/09/18 Left Forearm less than 1 day                  Physical Exam   Constitutional: He is oriented to person, place, and time  No distress  HENT:   Head: Normocephalic and atraumatic  Poor dentition   Eyes: Pupils are equal, round, and reactive to light  Neck: Normal range of motion  Neck supple  Cardiovascular: Normal rate, regular rhythm, normal heart sounds and intact distal pulses  Pulmonary/Chest: Tachypnea noted  He has rhonchi  Abdominal: Soft  Bowel sounds are normal    Musculoskeletal: He exhibits no edema  Neurological: He is alert and oriented to person, place, and time  Skin: Skin is warm and dry  He is not diaphoretic  Nursing note and vitals reviewed          LABORATORY RESULTS:    Results from last 7 days  Lab Units 12/10/18  0452 12/09/18  2256 12/09/18  1937   TROPONIN I ng/mL 0 35* 0 24* 0 11*     CBC with diff:   Results from last 7 days  Lab Units 12/10/18  0452 12/09/18 2256 12/09/18 1937 12/09/18  1037   WBC Thousand/uL 14 30*  --   --  16 14*   HEMOGLOBIN g/dL 8 8* 8 4* 7 2* 9 8*   HEMATOCRIT % 26 2* 25 2*  --  29 5*   MCV fL 102*  --   --  102*   PLATELETS Thousands/uL 212  --   --  265   MCH pg 34 2  --   --  33 8   MCHC g/dL 33 6  --   --  33 2   RDW % 14 6  --   --  14 2   MPV fL 11 0  --   --  11 9   NRBC AUTO /100 WBCs 0  --   --  0       CMP:  Results from last 7 days  Lab Units 12/10/18  0452 12/09/18  1037   POTASSIUM mmol/L 3 7 4 2   CHLORIDE mmol/L 108 102   CO2 mmol/L 21 22   BUN mg/dL 42* 68*   CREATININE mg/dL 1 75* 2 08*   CALCIUM mg/dL 8 5 8 7   AST U/L 17 19   ALT U/L 20 26   ALK PHOS U/L 49 55   EGFR ml/min/1 73sq m 36 30       BMP:  Results from last 7 days  Lab Units 12/10/18  0452 12/09/18  1037   POTASSIUM mmol/L 3 7 4 2   CHLORIDE mmol/L 108 102   CO2 mmol/L 21 22   BUN mg/dL 42* 68*   CREATININE mg/dL 1 75* 2 08*   CALCIUM mg/dL 8 5 8 7          No results found for: NTBNP                             Results from last 7 days  Lab Units 12/09/18  1037   INR  1 05     Lipid Profile:   No results found for: CHOL  No results found for: HDL  No results found for: LDLCALC  No results found for: TRIG        Imaging: I have personally reviewed pertinent reports  and I have personally reviewed pertinent films in PACS  Ct Abdomen Pelvis Wo Contrast  Result Date: 12/9/2018  Narrative: CT ABDOMEN AND PELVIS WITHOUT IV CONTRAST INDICATION:   Abdominal pain, unspecified  COMPARISON:  CT abdomen pelvis 5/11/2018 TECHNIQUE:  CT examination of the abdomen and pelvis was performed without intravenous contrast   Axial, sagittal, and coronal 2D reformatted images were created from the source data and submitted for interpretation  Radiation dose length product (DLP) for this visit:  330 mGy-cm   This examination, like all CT scans performed in the [facility], was performed utilizing techniques to minimize radiation dose exposure, including the use of iterative reconstruction and automated exposure control  Enteric contrast was not administered  FINDINGS: The exam is limited without IV and oral contrast  ABDOMEN LOWER CHEST:  Peripheral interstitial prominence noted at the lung bases which may represent interstitial lung disease  This is similar to the prior exam  LIVER/BILIARY TREE:  Unremarkable  GALLBLADDER:  No calcified gallstones  No pericholecystic inflammatory change  SPLEEN:  Unremarkable   PANCREAS:  Main pancreatic duct appears focally dilated at the head, not clearly seen on the prior examination  Region is obscured by streak artifact on the prior study  Tiny calcifications at the pancreatic head and uncinate process  Remaining pancreatic duct at the body and tail the pancreas is not dilated  No obvious peripancreatic fat stranding  ADRENAL GLANDS:  Unremarkable  KIDNEYS/URETERS:  No hydronephrosis  Slight renal cortical thinning on the left  Stable exophytic renal lesion on the left lower pole measuring up to 1 7 cm  This measures higher than simple fluid attenuation  STOMACH AND BOWEL:  Limited evaluation without contrast   No bowel obstruction  There is colonic diverticulosis without findings for acute diverticulitis  Sigmoid colon is redundant  APPENDIX:  There are expected postoperative changes of appendectomy  ABDOMINOPELVIC CAVITY:  No ascites or free intraperitoneal air  No lymphadenopathy  VESSELS:  Dense wall calcification along the aorta and arterial branches  Ectasia of the abdominal aorta without hans aneurysm  Stents in the bilateral common iliac arteries and bilateral external iliac artery  PELVIS REPRODUCTIVE ORGANS:  Prostate is mild moderately prominent  URINARY BLADDER:  Unremarkable  ABDOMINAL WALL/INGUINAL REGIONS:  Small fat-containing inguinal hernias  Tiny fat-containing umbilical hernia  OSSEOUS STRUCTURES:  No acute fracture or destructive osseous lesion  Multilevel degenerative spurring of the spine  Prior posterior fusion of the lumbar spine, L2-L5  Grade 2 anterolisthesis of L4 and L5  Impression: Limited exam without IV and oral contrast    1   Main pancreatic duct appears focally dilated at the head  Recommend follow-up with nonemergent MRCP or ERCP for further evaluation to exclude underlying lesion  No obvious findings of pancreatitis based on this limited exam    2   Colonic diverticulosis without acute diverticulitis  3   Stable hyperdense left renal lesion  Recommend follow-up with nonemergent ultrasound   The study was marked in [facility] for significant notification  Workstation performed: [id number]     Xr Chest 1 View Portable  Result Date: 12/9/2018  Narrative: CHEST INDICATION:   Cough  COMPARISON:  None EXAM PERFORMED/VIEWS:  XR CHEST PORTABLE FINDINGS: Cardiac silhouette appears unremarkable  Asymmetric prominence noted of the right hilum  The lungs are clear  Emphysematous changes suggested of the upper lobes  No pneumothorax or pleural effusion  Osseous structures appear within normal limits for patient age  Impression: Asymmetric density noted in the right hilar region  Question focal consolidation or mass  Recommend follow-up with contrast chest CT with IV contrast if feasible  The study was marked in EPIC for significant notification  Workstation performed: [id number]         Assessment  Principal Problem:    Melena  Active Problems:    COPD (chronic obstructive pulmonary disease) (HCC)    CAD (coronary artery disease)    Stage 3 chronic kidney disease (HCC)    DM2 (diabetes mellitus, type 2) (HCC)    Hypertension    Ischemic chest pain    Leukocytosis    Acute kidney injury ([facility] [address] )    Elevated lipase          Thank you for allowing us to participate in this patient's care  Counseling / Coordination of Care  Total floor / unit time spent today 45 minutes  Greater than 50% of total time was spent with the patient and / or family counseling and / or coordination of care  A description of the counseling / coordination of care: Review of history, current assessment, development of a plan  Code Status: Level 1 - Full Code    ** Please Note: Dragon 360 Dictation voice to text software may have been used in the creation of this document   **

## 2018-12-10 NOTE — ASSESSMENT & PLAN NOTE
No results found for: HGBA1C    Recent Labs      12/09/18   1615  12/09/18   1928  12/09/18   2358  12/10/18   0553   POCGLU  123  103  106  150*       Blood Sugar Average: Last 72 hrs:  (P) 120 5  · Obtain accu-checks q6h as will be NPO in anticipation of scope tomorrow  · Place on SSI coverage, holding oral agents

## 2018-12-10 NOTE — PLAN OF CARE
DISCHARGE PLANNING     Discharge to home or other facility with appropriate resources Progressing        GASTROINTESTINAL - ADULT     Maintains or returns to baseline bowel function Progressing        METABOLIC, FLUID AND ELECTROLYTES - ADULT     Electrolytes maintained within normal limits Progressing     Fluid balance maintained Progressing     Glucose maintained within target range Progressing        PAIN - ADULT     Verbalizes/displays adequate comfort level or baseline comfort level Progressing        Potential for Falls     Patient will remain free of falls Progressing        Prexisting or High Potential for Compromised Skin Integrity     Skin integrity is maintained or improved Progressing        SAFETY ADULT     Maintain or return to baseline ADL function Progressing     Maintain or return mobility status to optimal level Progressing

## 2018-12-10 NOTE — PROGRESS NOTES
Progress Note - Tatiana Smith 1940, 66 y o  male MRN: [id number]    Unit/Bed#: -01 Encounter: [id number]    Primary Care Provider: Alanis Michaud MD   Date and time admitted to hospital: 12/9/2018 10:10 AM    * Melena   Assessment & Plan    · Suspected GI bleed in setting of Melena as well as episodes of BRBPR  · Hgb (May 2018) 12 9--> 9 8 (on admission) now 8 8  · Cont protonix gtt  · On daily ASA/Plavix, will hold in setting of suspected GI bleeding  · Monitor Hgb q6h, would consider transfusion for <8 0 in setting of lateral ischemic EKG changes while in ED which improved post nitroglycerin administration   · Obtain type and screen  · Will transfuse given chest pain as below   · GI consult, discussed with Dr Lawanda Brady, team aware     Ischemic chest pain   Assessment & Plan    · 12 Lead EKG on presentation without lateral ischemic change  While in ED developed Chest pain with noted concern for lateral ischemia in setting of ST Segment depression with TWave inversion      · Patient administered 1SL nitro in ER, pain subsequently pain free, repeated 12 Lead with EKG resolution  · Suspect secondary to anemia vs unstable angina  · Will transfuse 1 unit   · On ASA/Plavix, will hold in setting of Melena / suspected GI Bleeding  · Troponin trend 0 03-->0 21-->0 35  · Appreciate cardiology input       Acute kidney injury [facility])   Assessment & Plan    · History of CKD appears unspecified; however Stage III based on GFR  · Improving with IVF   · Will hold ACEI  · Avoid nephrotoxic agents  · Monitor U/O as well as renal function     Stage 3 chronic kidney disease ([facility] [address] )   Assessment & Plan    · As noted above under NIDHI     Leukocytosis   Assessment & Plan    · Doubtful of infectious etiology as no fevers, chills or symptoms concerning for such  · Believe stress related  · Closely monitor temps as well as WBC count off antibiotics  · CT A/P obtained within the ER Main pancreatic duct appears focally dilated at the head  Recommending follow-up with nonemergent MRCP or ERCP for further evaluation  Colonic diverticulosis without acute diverticulitis  Stable hyperdense left renal lesion  CAD (coronary artery disease)   Assessment & Plan    · History of CAD on ASA/Plavix with prior history in Care Everywhere   · ECHO reviewed from June 2017 revealed EF 55%  · Will hold antihypertensives including Norvasc/Zestril  · Consider resuming lopressor      DM2 (diabetes mellitus, type 2) ([facility] [address] )   Assessment & Plan    No results found for: HGBA1C    Recent Labs      12/09/18   1615  12/09/18   1928  12/09/18   2358  12/10/18   0553   POCGLU  123  103  106  150*       Blood Sugar Average: Last 72 hrs:  (P) 120 5  · Obtain accu-checks q6h as will be NPO in anticipation of scope tomorrow  · Place on SSI coverage, holding oral agents       COPD (chronic obstructive pulmonary disease) ([facility])   Assessment & Plan    · Per [facility] chart review, patient known to [facility]  · Mild COPD well controlled on Symbicort and does not require supplemental home O2  · Continues to smoke, will encourage smoking cessation and will order nicotine patch  · Monitor respiratory status, no acute exacerbation, will place on Xopenex / Atrovent tid prn  VTE Pharmacologic Prophylaxis:   Pharmacologic: on hold due to GI bleed  Mechanical VTE Prophylaxis in Place: Yes    Patient Centered Rounds: I have performed bedside rounds with nursing staff today  Discussions with Specialists or Other Care Team Provider:     Education and Discussions with Family / Patient:     Time Spent for Care: 30 minutes  More than 50% of total time spent on counseling and coordination of care as described above      Current Length of Stay: 1 day(s)    Current Patient Status: Inpatient   Certification Statement: The patient will continue to require additional inpatient hospital stay due to GI bleed, suspected ischemic chest pain     Discharge Plan / Estimated Discharge Date: TBD based on clinical course    Code Status: Level 1 - Full Code    Subjective:   Pt feels okay  No LH or dizziness  No further episodes of melena overnight  Did have 2 more occurrences of chest pain  Objective:     Vitals:   Temp (24hrs), Av 4 °F (36 9 °C), Min:97 5 °F (36 4 °C), Max:98 9 °F (37 2 °C)    Temp:  [97 5 °F (36 4 °C)-98 9 °F (37 2 °C)] 98 2 °F (36 8 °C)  HR:  [65-89] 78  Resp:  [18-24] 20  BP: (108-168)/(48-80) 120/60  SpO2:  [90 %-99 %] 94 %  Body mass index is 26 52 kg/m²  Input and Output Summary (last 24 hours): Intake/Output Summary (Last 24 hours) at 12/10/18 0840  Last data filed at 12/10/18 0701   Gross per 24 hour   Intake          3461 33 ml   Output             2050 ml   Net          1411 33 ml       Physical Exam:     Physical Exam   Constitutional: No distress  HENT:   Head: Normocephalic and atraumatic  Cardiovascular: Normal rate and regular rhythm  Pulmonary/Chest: Effort normal and breath sounds normal  No respiratory distress  He has no wheezes  Abdominal: Soft  Bowel sounds are normal  He exhibits no distension  There is no tenderness  There is no rebound  Musculoskeletal: He exhibits no edema  Neurological: He is alert  Skin: Skin is warm and dry  He is not diaphoretic  Psychiatric: He has a normal mood and affect  His behavior is normal    Nursing note and vitals reviewed      Additional Data:     Labs:      Results from last 7 days  Lab Units 12/10/18  0452   WBC Thousand/uL 14 30*   HEMOGLOBIN g/dL 8 8*   HEMATOCRIT % 26 2*   PLATELETS Thousands/uL 212   NEUTROS PCT % 71   LYMPHS PCT % 17   MONOS PCT % 7   EOS PCT % 2       Results from last 7 days  Lab Units 12/10/18  0452   POTASSIUM mmol/L 3 7   CHLORIDE mmol/L 108   CO2 mmol/L 21   BUN mg/dL 42*   CREATININE mg/dL 1 75*   CALCIUM mg/dL 8 5   ALK PHOS U/L 49   ALT U/L 20   AST U/L 17       Results from last 7 days  Lab Units 18  1037   INR  1 05       * I Have Reviewed All Lab Data Listed Above  * Additional Pertinent Lab Tests Reviewed: All Labs Within Last 24 Hours Reviewed    Imaging:    Imaging Reports Reviewed Today Include:   Imaging Personally Reviewed by Myself Includes:      Recent Cultures (last 7 days):           Last 24 Hours Medication List:     Current Facility-Administered Medications:  albuterol 2 5 mg Nebulization Q4H PRN Jeanmarie Rose MD    budesonide-formoterol 2 puff Inhalation BID Cynthia Wu PA-C    insulin lispro 1-5 Units Subcutaneous Q6H [address] Cynthia Wu PA-C    levalbuterol 1 25 mg Nebulization TID Jeanmarie Rose MD    multi-electrolyte 75 mL/hr Intravenous Continuous Cynthia Wu PA-C Last Rate: 75 mL/hr (12/10/18 0549)   nicotine 14 mg Transdermal Daily Cynthia Wu PA-C    pantoprozole (PROTONIX) infusion (Continuous) 8 mg/hr Intravenous Continuous Xiao Rios PA-C Last Rate: 8 mg/hr (12/10/18 0049)   sodium chloride 125 mL/hr Intravenous Continuous Jose Garcia MD Last Rate: Stopped (12/09/18 1619)   sodium chloride 3 mL Nebulization TID Jeanmarie Rose MD         Today, Patient Was Seen By: Jeanmarie Rose MD    ** Please Note: This note has been constructed using a voice recognition system   **

## 2018-12-10 NOTE — ASSESSMENT & PLAN NOTE
· Per [facility] chart review, patient known to [facility]  · Mild COPD well controlled on Symbicort and does not require supplemental home O2  · Continues to smoke, will encourage smoking cessation and will order nicotine patch  · Monitor respiratory status, no acute exacerbation, will place on Xopenex / Atrovent tid prn

## 2018-12-10 NOTE — PROGRESS NOTES
Pt O2 was continuously dropping below 88% while sleeping  Applied 2L of O2 via NC  Continue to monitor pt on continuous pulse ox

## 2018-12-10 NOTE — RESPIRATORY THERAPY NOTE
RT Protocol Note  Nellie Whittington 66 y o  male MRN: [id number]  Unit/Bed#: -01 Encounter: [id number]    Assessment    Principal Problem:    Melena  Active Problems:    Stage 3 chronic kidney disease (HCC)    DM2 (diabetes mellitus, type 2) (HCC)    COPD (chronic obstructive pulmonary disease) (HCC)    Hypertension    CAD (coronary artery disease)    Ischemic chest pain    Leukocytosis    Lactic acidosis    Acute kidney injury ([facility] )    Elevated lipase      Home Pulmonary Medications: Alb inhaler PRN       Past Medical History:   Diagnosis Date    BPH (benign prostatic hyperplasia)     CKD (chronic kidney disease)     COPD (chronic obstructive pulmonary disease) ([place] [address] )     Coronary artery disease     Diabetes mellitus ([place] [address] )     History of heart attack     Hyperlipidemia     Hypertension     PAD (peripheral artery disease) ([place] [address] )      Social History     Social History    Marital status:      Spouse name: N/A    Number of children: N/A    Years of education: N/A     Social History Main Topics    Smoking status: Current Every Day Smoker     Packs/day: 0 50     Years: 60 00    Smokeless tobacco: Never Used      Comment: heavy tobacco smoker per [employer]    Alcohol use No    Drug use: No    Sexual activity: Not Asked     Other Topics Concern    None     Social History Narrative    None       Subjective         Objective    Physical Exam:   Assessment Type: Assess only  General Appearance: Alert  Respiratory Pattern: Dyspnea at rest  Chest Assessment: Chest expansion symmetrical  Cough: Non-productive  O2 Device: NC    Vitals:  Blood pressure (!) 108/48, pulse 70, temperature 98 6 °F (37 °C), temperature source Oral, resp  rate 18, height 5' 2" (1 575 m), weight 65 8 kg (145 lb), SpO2 90 %  Imaging and other studies: I have personally reviewed pertinent reports        O2 Device: NC     Plan    Respiratory Plan: Mild Distress pathway        Resp Comments: Assessed pt per respiratory protocol, called to pt room for SPO2 desaturation, pt complaints of chest pain, SPO2 77% pt placed on non rebreather, has hx of COPD does not take tx at home just a PRN inhaler  Will make pt TID xop/nss for current respiratory distress  RT will continue to monitor and reevalute pt needs

## 2018-12-10 NOTE — OP NOTE
ESOPHAGOGASTRODUODENOSCOPY    PROCEDURE: EGD    SEDATION: Monitored anesthesia care, check anesthesia records    ASA Class: 4    INDICATIONS:  GI bleed, melena, anemia    CONSENT:  Informed consent was obtained for the procedure, including sedation after explaining the risks and benefits of the procedure  Risks including but not limited to bleeding, perforation, infection, and missed lesion  PREPARATION:   Telemetry, pulse oximetry, blood pressure were monitored throughout the procedure  Patient was identified by myself both verbally and by visual inspection of ID band  DESCRIPTION:   Patient was placed in the left lateral decubitus position and was sedated with the above medication  The gastroscope was introduced in to the oropharynx and the esophagus was intubated under direct visualization  Scope was passed down the esophagus up to 2nd part of the duodenum  A careful inspection was made as the gastroscope was withdrawn, including a retroflexed view of the stomach; findings and interventions are described below  FINDINGS:    #1  Esophagus- nonobstructing Schatzki's ring    #2  Stomach- mild gastritis, small hiatal hernia on retroflexion    #3  Duodenum- several duodenal ulcers in the bulb extending to the 2nd portion  In the 2nd portion there is a deep crated ulcer with visible vessel  Margins ulcer were injected with epinephrine, 100,000, 7 cc  The visible vessel self was treated with gold probe with good ablation  The patient had episode of bradycardia during the procedure which resolved after glycopyrrolate         IMPRESSIONS:      Upper GI bleed secondary to duodenal ulcers, large ulcer with visible vessel was since 2nd portion treated with epinephrine and gold probe with good ablation of visible vessel    RECOMMENDATIONS:     Return to floor  Clear liquid diet  IV b i d   PPI therapy  Watch for any signs of bleeding  Proceed with cardiac workup, okay to anticoagulate as needed  Will need repeat upper endoscopy in 2 months    COMPLICATIONS:  None; patient tolerated the procedure well            DISPOSITION: PACU           CONDITION: Stable

## 2018-12-10 NOTE — ASSESSMENT & PLAN NOTE
· History of CAD on ASA/Plavix with prior history in Care Everywhere   · ECHO reviewed from June 2017 revealed EF 55%  · Will hold antihypertensives including Norvasc/Zestril  · Consider resuming lopressor

## 2018-12-10 NOTE — ANESTHESIA POSTPROCEDURE EVALUATION
Post-Op Assessment Note      CV Status:  Stable    Mental Status:  Alert and awake    Hydration Status:  Stable    PONV Controlled:  None    Airway Patency:  Patent    Post Op Vitals Reviewed: Yes          Staff: Anesthesiologist     Appears now to be in Atrial fib  C/O Chest pain going from R to L Shoulder  Discussed with his [facility] internist, Dr Nenita Brito  Will see patient in Post op GI prior to D/C to Step down  Addendum, Seen by Dr Nenita Brito in GI, OK to transfer to Step down unit        /64 (12/10/18 1500)    Temp (!) 97 4 °F (36 3 °C) (12/10/18 1500)    Pulse (!) 112 (12/10/18 1500)   Resp (!) 24 (12/10/18 1500)    SpO2 96 % (12/10/18 1500)

## 2018-12-11 ENCOUNTER — APPOINTMENT (INPATIENT)
Dept: CT IMAGING | Facility: HOSPITAL | Age: 78
DRG: 377 | End: 2018-12-11
Payer: COMMERCIAL

## 2018-12-11 ENCOUNTER — APPOINTMENT (INPATIENT)
Dept: NON INVASIVE DIAGNOSTICS | Facility: HOSPITAL | Age: 78
DRG: 377 | End: 2018-12-11
Payer: COMMERCIAL

## 2018-12-11 ENCOUNTER — APPOINTMENT (INPATIENT)
Dept: RADIOLOGY | Facility: HOSPITAL | Age: 78
DRG: 377 | End: 2018-12-11
Payer: COMMERCIAL

## 2018-12-11 PROBLEM — K92.2 GI BLEED: Status: ACTIVE | Noted: 2018-12-11

## 2018-12-11 PROBLEM — J96.01 ACUTE RESPIRATORY FAILURE WITH HYPOXIA (HCC): Status: ACTIVE | Noted: 2018-12-11

## 2018-12-11 PROBLEM — R07.9 CHEST PAIN: Status: ACTIVE | Noted: 2018-12-09

## 2018-12-11 PROBLEM — J93.9 PNEUMOTHORAX: Status: ACTIVE | Noted: 2018-12-11

## 2018-12-11 PROBLEM — J98.11 ATELECTASIS OF RIGHT LUNG: Status: ACTIVE | Noted: 2018-12-11

## 2018-12-11 LAB
ABO GROUP BLD BPU: NORMAL
ABO GROUP BLD BPU: NORMAL
ATRIAL RATE: 78 BPM
ATRIAL RATE: 91 BPM
ATRIAL RATE: 94 BPM
ATRIAL RATE: 96 BPM
BASOPHILS # BLD AUTO: 0.07 THOUSANDS/ΜL (ref 0–0.1)
BASOPHILS NFR BLD AUTO: 0 % (ref 0–1)
BPU ID: NORMAL
BPU ID: NORMAL
CHOLEST SERPL-MCNC: 136 MG/DL (ref 50–200)
CROSSMATCH: NORMAL
CROSSMATCH: NORMAL
EOSINOPHIL # BLD AUTO: 0.11 THOUSAND/ΜL (ref 0–0.61)
EOSINOPHIL NFR BLD AUTO: 1 % (ref 0–6)
ERYTHROCYTE [DISTWIDTH] IN BLOOD BY AUTOMATED COUNT: 15.4 % (ref 11.6–15.1)
GLUCOSE SERPL-MCNC: 161 MG/DL (ref 65–140)
GLUCOSE SERPL-MCNC: 167 MG/DL (ref 65–140)
GLUCOSE SERPL-MCNC: 198 MG/DL (ref 65–140)
GLUCOSE SERPL-MCNC: 222 MG/DL (ref 65–140)
GLUCOSE SERPL-MCNC: 228 MG/DL (ref 65–140)
GLUCOSE SERPL-MCNC: 250 MG/DL (ref 65–140)
HCT VFR BLD AUTO: 31.9 % (ref 36.5–49.3)
HDLC SERPL-MCNC: 47 MG/DL (ref 40–60)
HGB BLD-MCNC: 10 G/DL (ref 12–17)
HGB BLD-MCNC: 10.9 G/DL (ref 12–17)
HGB BLD-MCNC: 9.6 G/DL (ref 12–17)
HGB BLD-MCNC: 9.9 G/DL (ref 12–17)
IMM GRANULOCYTES # BLD AUTO: 0.28 THOUSAND/UL (ref 0–0.2)
IMM GRANULOCYTES NFR BLD AUTO: 2 % (ref 0–2)
LDLC SERPL CALC-MCNC: 46 MG/DL (ref 0–100)
LYMPHOCYTES # BLD AUTO: 1.75 THOUSANDS/ΜL (ref 0.6–4.47)
LYMPHOCYTES NFR BLD AUTO: 11 % (ref 14–44)
MAGNESIUM SERPL-MCNC: 1.6 MG/DL (ref 1.6–2.6)
MCH RBC QN AUTO: 33.6 PG (ref 26.8–34.3)
MCHC RBC AUTO-ENTMCNC: 34.2 G/DL (ref 31.4–37.4)
MCV RBC AUTO: 99 FL (ref 82–98)
MONOCYTES # BLD AUTO: 1.36 THOUSAND/ΜL (ref 0.17–1.22)
MONOCYTES NFR BLD AUTO: 9 % (ref 4–12)
NEUTROPHILS # BLD AUTO: 12.34 THOUSANDS/ΜL (ref 1.85–7.62)
NEUTS SEG NFR BLD AUTO: 77 % (ref 43–75)
NONHDLC SERPL-MCNC: 89 MG/DL
NRBC BLD AUTO-RTO: 0 /100 WBCS
P AXIS: 105 DEGREES
P AXIS: 23 DEGREES
PLATELET # BLD AUTO: 246 THOUSANDS/UL (ref 149–390)
PMV BLD AUTO: 11.1 FL (ref 8.9–12.7)
PR INTERVAL: 232 MS
PR INTERVAL: 256 MS
PROCALCITONIN SERPL-MCNC: 0.12 NG/ML
QRS AXIS: -16 DEGREES
QRS AXIS: -17 DEGREES
QRS AXIS: -2 DEGREES
QRS AXIS: -40 DEGREES
QRSD INTERVAL: 78 MS
QRSD INTERVAL: 82 MS
QRSD INTERVAL: 86 MS
QRSD INTERVAL: 92 MS
QT INTERVAL: 364 MS
QT INTERVAL: 384 MS
QT INTERVAL: 412 MS
QT INTERVAL: 416 MS
QTC INTERVAL: 448 MS
QTC INTERVAL: 457 MS
QTC INTERVAL: 469 MS
QTC INTERVAL: 552 MS
RBC # BLD AUTO: 3.24 MILLION/UL (ref 3.88–5.62)
T WAVE AXIS: 103 DEGREES
T WAVE AXIS: 141 DEGREES
T WAVE AXIS: 40 DEGREES
T WAVE AXIS: 71 DEGREES
TRIGL SERPL-MCNC: 214 MG/DL
UNIT DISPENSE STATUS: NORMAL
UNIT DISPENSE STATUS: NORMAL
UNIT PRODUCT CODE: NORMAL
UNIT PRODUCT CODE: NORMAL
UNIT RH: NORMAL
UNIT RH: NORMAL
VENTRICULAR RATE: 106 BPM
VENTRICULAR RATE: 78 BPM
VENTRICULAR RATE: 82 BPM
VENTRICULAR RATE: 95 BPM
WBC # BLD AUTO: 15.91 THOUSAND/UL (ref 4.31–10.16)

## 2018-12-11 PROCEDURE — 94660 CPAP INITIATION&MGMT: CPT

## 2018-12-11 PROCEDURE — 82948 REAGENT STRIP/BLOOD GLUCOSE: CPT

## 2018-12-11 PROCEDURE — 85018 HEMOGLOBIN: CPT | Performed by: INTERNAL MEDICINE

## 2018-12-11 PROCEDURE — 99232 SBSQ HOSP IP/OBS MODERATE 35: CPT | Performed by: INTERNAL MEDICINE

## 2018-12-11 PROCEDURE — 99223 1ST HOSP IP/OBS HIGH 75: CPT | Performed by: INTERNAL MEDICINE

## 2018-12-11 PROCEDURE — 85018 HEMOGLOBIN: CPT | Performed by: PHYSICIAN ASSISTANT

## 2018-12-11 PROCEDURE — 0W9930Z DRAINAGE OF RIGHT PLEURAL CAVITY WITH DRAINAGE DEVICE, PERCUTANEOUS APPROACH: ICD-10-PCS | Performed by: INTERNAL MEDICINE

## 2018-12-11 PROCEDURE — 94760 N-INVAS EAR/PLS OXIMETRY 1: CPT

## 2018-12-11 PROCEDURE — 71250 CT THORAX DX C-: CPT

## 2018-12-11 PROCEDURE — 93306 TTE W/DOPPLER COMPLETE: CPT | Performed by: INTERNAL MEDICINE

## 2018-12-11 PROCEDURE — 71045 X-RAY EXAM CHEST 1 VIEW: CPT

## 2018-12-11 PROCEDURE — C1729 CATH, DRAINAGE: HCPCS

## 2018-12-11 PROCEDURE — 93306 TTE W/DOPPLER COMPLETE: CPT

## 2018-12-11 PROCEDURE — 99233 SBSQ HOSP IP/OBS HIGH 50: CPT | Performed by: INTERNAL MEDICINE

## 2018-12-11 PROCEDURE — 83735 ASSAY OF MAGNESIUM: CPT | Performed by: INTERNAL MEDICINE

## 2018-12-11 PROCEDURE — 32557 INSERT CATH PLEURA W/ IMAGE: CPT

## 2018-12-11 PROCEDURE — C9113 INJ PANTOPRAZOLE SODIUM, VIA: HCPCS | Performed by: PHYSICIAN ASSISTANT

## 2018-12-11 PROCEDURE — 94762 N-INVAS EAR/PLS OXIMTRY CONT: CPT

## 2018-12-11 PROCEDURE — 93010 ELECTROCARDIOGRAM REPORT: CPT | Performed by: INTERNAL MEDICINE

## 2018-12-11 PROCEDURE — 80061 LIPID PANEL: CPT | Performed by: NURSE PRACTITIONER

## 2018-12-11 PROCEDURE — C1769 GUIDE WIRE: HCPCS

## 2018-12-11 PROCEDURE — 84145 PROCALCITONIN (PCT): CPT | Performed by: INTERNAL MEDICINE

## 2018-12-11 PROCEDURE — 32557 INSERT CATH PLEURA W/ IMAGE: CPT | Performed by: RADIOLOGY

## 2018-12-11 PROCEDURE — 94640 AIRWAY INHALATION TREATMENT: CPT

## 2018-12-11 PROCEDURE — 85025 COMPLETE CBC W/AUTO DIFF WBC: CPT | Performed by: INTERNAL MEDICINE

## 2018-12-11 RX ORDER — AMLODIPINE BESYLATE 5 MG/1
5 TABLET ORAL DAILY
Status: DISCONTINUED | OUTPATIENT
Start: 2018-12-11 | End: 2018-12-15

## 2018-12-11 RX ORDER — FUROSEMIDE 10 MG/ML
20 INJECTION INTRAMUSCULAR; INTRAVENOUS ONCE
Status: COMPLETED | OUTPATIENT
Start: 2018-12-11 | End: 2018-12-11

## 2018-12-11 RX ORDER — CLOPIDOGREL BISULFATE 75 MG/1
75 TABLET ORAL DAILY
Status: DISCONTINUED | OUTPATIENT
Start: 2018-12-11 | End: 2018-12-16 | Stop reason: HOSPADM

## 2018-12-11 RX ORDER — PANTOPRAZOLE SODIUM 40 MG/1
40 INJECTION, POWDER, FOR SOLUTION INTRAVENOUS EVERY 12 HOURS
Status: DISCONTINUED | OUTPATIENT
Start: 2018-12-11 | End: 2018-12-12

## 2018-12-11 RX ADMIN — ISODIUM CHLORIDE 3 ML: 0.03 SOLUTION RESPIRATORY (INHALATION) at 19:53

## 2018-12-11 RX ADMIN — PANTOPRAZOLE SODIUM 40 MG: 40 INJECTION, POWDER, FOR SOLUTION INTRAVENOUS at 22:56

## 2018-12-11 RX ADMIN — INSULIN LISPRO 2 UNITS: 100 INJECTION, SOLUTION INTRAVENOUS; SUBCUTANEOUS at 13:17

## 2018-12-11 RX ADMIN — INSULIN LISPRO 1 UNITS: 100 INJECTION, SOLUTION INTRAVENOUS; SUBCUTANEOUS at 08:28

## 2018-12-11 RX ADMIN — METOPROLOL TARTRATE 12.5 MG: 25 TABLET ORAL at 08:26

## 2018-12-11 RX ADMIN — PANTOPRAZOLE SODIUM 40 MG: 40 INJECTION, POWDER, FOR SOLUTION INTRAVENOUS at 09:15

## 2018-12-11 RX ADMIN — INSULIN LISPRO 2 UNITS: 100 INJECTION, SOLUTION INTRAVENOUS; SUBCUTANEOUS at 22:54

## 2018-12-11 RX ADMIN — SODIUM CHLORIDE 8 MG/HR: 9 INJECTION, SOLUTION INTRAVENOUS at 01:01

## 2018-12-11 RX ADMIN — BUDESONIDE AND FORMOTEROL FUMARATE DIHYDRATE 2 PUFF: 160; 4.5 AEROSOL RESPIRATORY (INHALATION) at 17:32

## 2018-12-11 RX ADMIN — METOPROLOL TARTRATE 12.5 MG: 25 TABLET ORAL at 22:56

## 2018-12-11 RX ADMIN — LEVALBUTEROL HYDROCHLORIDE 1.25 MG: 1.25 SOLUTION, CONCENTRATE RESPIRATORY (INHALATION) at 14:27

## 2018-12-11 RX ADMIN — INSULIN LISPRO 1 UNITS: 100 INJECTION, SOLUTION INTRAVENOUS; SUBCUTANEOUS at 17:38

## 2018-12-11 RX ADMIN — ISODIUM CHLORIDE 3 ML: 0.03 SOLUTION RESPIRATORY (INHALATION) at 14:27

## 2018-12-11 RX ADMIN — CLOPIDOGREL 75 MG: 75 TABLET, FILM COATED ORAL at 09:16

## 2018-12-11 RX ADMIN — BUDESONIDE AND FORMOTEROL FUMARATE DIHYDRATE 2 PUFF: 160; 4.5 AEROSOL RESPIRATORY (INHALATION) at 08:27

## 2018-12-11 RX ADMIN — AMLODIPINE BESYLATE 5 MG: 5 TABLET ORAL at 09:16

## 2018-12-11 RX ADMIN — ISODIUM CHLORIDE 3 ML: 0.03 SOLUTION RESPIRATORY (INHALATION) at 08:44

## 2018-12-11 RX ADMIN — FUROSEMIDE 20 MG: 10 INJECTION, SOLUTION INTRAMUSCULAR; INTRAVENOUS at 00:06

## 2018-12-11 RX ADMIN — FUROSEMIDE 20 MG: 10 INJECTION, SOLUTION INTRAMUSCULAR; INTRAVENOUS at 09:15

## 2018-12-11 RX ADMIN — INSULIN LISPRO 1 UNITS: 100 INJECTION, SOLUTION INTRAVENOUS; SUBCUTANEOUS at 00:21

## 2018-12-11 RX ADMIN — LEVALBUTEROL HYDROCHLORIDE 1.25 MG: 1.25 SOLUTION, CONCENTRATE RESPIRATORY (INHALATION) at 19:53

## 2018-12-11 RX ADMIN — LEVALBUTEROL HYDROCHLORIDE 1.25 MG: 1.25 SOLUTION, CONCENTRATE RESPIRATORY (INHALATION) at 08:44

## 2018-12-11 NOTE — PROGRESS NOTES
Called by patient's RN  Patient complaining of substernal chest pain rated 10 10  All vital signs stable  EKG reveals lateral ischemia with ST segment depression-- this is similar to EKG upon admission to the hospital   Of note, patient is maintained on both isolyte at 75 cc/hour and normal saline at 125 cc/hour at this time  Will discontinue normal saline and isolyte at this time as patient is eating/drinking appropriately  Patient provided with morphine and nitroglycerin and repeat EKG was obtained  Repeat EKG shows improvement of ST depression in lateral leads    Chest x-ray does not reveal much acute changed compared to prior one from this afternoon  Troponin is less than prior (0 4-->0 35)  Physical exam reveals faint bibasilar rales  Noted to have hypoxia with drops to mid 80s on NC  Patient is in no respiratory distress at this time-- will place order for HFNC for now  Received Lasix 20 mg IV x1  Monitor response and give additional diuresis at this time

## 2018-12-11 NOTE — PROGRESS NOTES
General Cardiology   Progress Note -  Team One   Judy Bolden 66 y o  male MRN: [id number]    Unit/Bed#: -[facility] Encounter: [id number]    Assessment/ Plan  Chest pain-likely secondary to anemia given hx CAD  He had an episode of chest pain last night around 8:00 p m  felt by Internal Medicine to be related to hypoxia and volume overload  He did require treatment with high-flow nasal cannula and IV Lasix 20 mg  He is net positive 1 6 L  he did undergo EGD yesterday and likely receive some fluids there  He was anemic and was receiving fluids as well  He also received packed cells  A portable chest x-ray was done which demonstrated some vascular congestion when compared to prior  Will give another 1 time dose of Lasix 20 mg IV this morning  Elevated troponin to 0 4-this is likely a type 2 non STEMI secondary to acute anemia  · Echo pending  Upper GI bleed secondary to duodenal ulcers, large ulcer with visible vessel was since 2nd portion treated with epinephrine and gold probe with good ablation of visible vessel  Acute anemia-hemoglobin  Stable around 10, improved from 7 2 after PRBC; baseline is around 12  5 in May 2018  He is on n IV Protonix drip   CAD, status post inferior MI x 2-he had underwent PTCA of a 90% LAD lesion in 1992  [facility] Hypertension  Average blood pressure 146/67, on BB  As an outpatient on amlodipine 10 mg daily, lisinopril 40 mg daily and BB  Hyperlipidemia-on pravastatin 40 mg daily  His LDL in October was 58, non HDL 81  Diabetes mellitus, type 2; HgA1c 8 7  Consider adding Victoza, Jardiance or Invokana for Cardiovascular Prevention ( 2018 [facility] Expert Consensus Decision Pathway on Novel Therapies for Cardiovascular Risk Reduction in Patients with Type 2 Diabetes and ASCVD)  COPD/emphysema  CKD, stage III    Baseline creatinine appears to be 1 7 to 2 0; currently 1 75  PVD   · status post bilateral iliac stenting September 2014  · Chronically on aspirin 81 and Plavix 75 mg daily; all held given acute anemia and melena  · Right carotid artery stenosis-70%; left carotid artery stenosis-less than 50%, asymptomatic  Follows with [facility] vascular surgery as an outpatient   Tobacco abuse-advised and counseled to quit  He tells me he is ready to quit  He had a nicotine patch has been ordered  Allergy to an unknown contrast    Discussion  Okay to Restart antiplatelet, per GI  Will start Plavix 75 mg daily  He had been on dual antiplatelet therapy its given prior iliac stenting in 2014  Monitor telemetry  He has had some brief 2 to 1 block at HS  He is on low-dose beta-blocker  Will resume Norvasc/ antianginal, at a reduced dose        Subjective  Review of Systems   Constitution: Negative for chills and weakness  Cardiovascular: Negative for chest pain, claudication, cyanosis, dyspnea on exertion, irregular heartbeat, leg swelling, near-syncope, orthopnea, palpitations, paroxysmal nocturnal dyspnea and syncope  Respiratory: Positive for cough and shortness of breath  Gastrointestinal: Negative for heartburn and nausea  Neurological: Negative for dizziness, focal weakness, headaches and light-headedness  All other systems reviewed and are negative  Objective:   Vitals: Blood pressure 155/69, pulse 77, temperature 98 1 °F (36 7 °C), temperature source Oral, resp  rate 18, height 5' 2" (1 575 m), weight 63 4 kg (139 lb 12 4 oz), SpO2 90 %  ,       Body mass index is 25 56 kg/m²  ,     Systolic (77BSM), KYB:515 , Min:125 , QT     Diastolic (62MBN), UUD:65, Min:57, Max:81          Intake/Output Summary (Last 24 hours) at 18 0802  Last data filed at 18 0511   Gross per 24 hour   Intake          1851 91 ml   Output             1620 ml   Net           231 91 ml     Weight (last 2 days)     Date/Time   Weight    18 0537  63 4 (139 77)    12/10/18 0555  65 8 (145)    18 1528  69 2 (152 56)    18 1457  69 2 (152 56)    18 1015  70 3 (154 98) Telemetry Review:  Normal sinus rhythm  Brief 2 to 1 block at HS  Otherwise, normal sinus rhythm    EKG December 10 at 11:39 p m :  Sinus rhythm 78 beats per minute with first-degree AV block  Pr interval 256m sec and nonspecific ST T wave changes    Physical Exam   Constitutional: He is oriented to person, place, and time  No distress  HENT:   Head: Normocephalic and atraumatic  Eyes: Pupils are equal, round, and reactive to light  Neck: Normal range of motion  Neck supple  Cardiovascular: Normal rate, regular rhythm, normal heart sounds and intact distal pulses  Pulmonary/Chest: Effort normal  He has decreased breath sounds  He has rhonchi  Abdominal: Soft  Bowel sounds are normal    Neurological: He is alert and oriented to person, place, and time  Skin: Skin is warm and dry  He is not diaphoretic  Psychiatric: He has a normal mood and affect  Nursing note and vitals reviewed        LABORATORY RESULTS    Results from last 7 days  Lab Units 12/10/18  2024 12/10/18  1912 12/10/18  1543   TROPONIN I ng/mL 0 35* 0 40* 0 27*     CBC with diff:   Results from last 7 days  Lab Units 12/11/18  0512 12/11/18  0005 12/10/18  1912 12/10/18  1247 12/10/18  0452 12/09/18  2256 12/09/18  1937 12/09/18  1037   WBC Thousand/uL  --   --   --   --  14 30*  --   --  16 14*   HEMOGLOBIN g/dL 10 0* 9 6* 9 4* 9 6* 8 8* 8 4* 7 2* 9 8*   HEMATOCRIT %  --   --   --   --  26 2* 25 2*  --  29 5*   MCV fL  --   --   --   --  102*  --   --  102*   PLATELETS Thousands/uL  --   --   --   --  212  --   --  265   MCH pg  --   --   --   --  34 2  --   --  33 8   MCHC g/dL  --   --   --   --  33 6  --   --  33 2   RDW %  --   --   --   --  14 6  --   --  14 2   MPV fL  --   --   --   --  11 0  --   --  11 9   NRBC AUTO /100 WBCs  --   --   --   --  0  --   --  0       CMP:  Results from last 7 days  Lab Units 12/10/18  0452 12/09/18  1037   POTASSIUM mmol/L 3 7 4 2   CHLORIDE mmol/L 108 102   CO2 mmol/L 21 22   BUN mg/dL 42* 68*   CREATININE mg/dL 1 75* 2 08*   CALCIUM mg/dL 8 5 8 7   AST U/L 17 19   ALT U/L 20 26   ALK PHOS U/L 49 55   EGFR ml/min/1 73sq m 36 30       BMP:  Results from last 7 days  Lab Units 12/10/18  0452 12/09/18  1037   POTASSIUM mmol/L 3 7 4 2   CHLORIDE mmol/L 108 102   CO2 mmol/L 21 22   BUN mg/dL 42* 68*   CREATININE mg/dL 1 75* 2 08*   CALCIUM mg/dL 8 5 8 7       No results found for: NTBNP                 Results from last 7 days  Lab Units 12/10/18  1247   HEMOGLOBIN A1C % 8 7*                Results from last 7 days  Lab Units 12/09/18  1037   INR  1 05       Lipid Profile:   No results found for: CHOL  Lab Results   Component Value Date    HDL 47 12/11/2018     Lab Results   Component Value Date    LDLCALC 46 12/11/2018     Lab Results   Component Value Date    TRIG 214 (H) 12/11/2018         Meds/Allergies   all current active meds have been reviewed and current meds:   Current Facility-Administered Medications   Medication Dose Route Frequency    albuterol inhalation solution 2 5 mg  2 5 mg Nebulization Q4H PRN    budesonide-formoterol (SYMBICORT) 160-4 5 mcg/act inhaler 2 puff  2 puff Inhalation BID    insulin lispro (HumaLOG) 100 units/mL subcutaneous injection 1-5 Units  1-5 Units Subcutaneous Q6H [address]    levalbuterol (XOPENEX) inhalation solution 1 25 mg  1 25 mg Nebulization TID    metoprolol tartrate (LOPRESSOR) partial tablet 12 5 mg  12 5 mg Oral Q12H [address]    multi-electrolyte (ISOLYTE-S PH 7 4 equivalent) IV solution  75 mL/hr Intravenous Continuous    nicotine (NICODERM CQ) 14 mg/24hr TD 24 hr patch 14 mg  14 mg Transdermal Daily    nitroglycerin (NITROSTAT) SL tablet 0 4 mg  0 4 mg Sublingual Q5 Min PRN    pantoprazole (PROTONIX) 80 mg in sodium chloride 0 9 % 100 mL infusion  8 mg/hr Intravenous Continuous    sodium chloride 0 9 % inhalation solution 3 mL  3 mL Nebulization TID     Prescriptions Prior to Admission   Medication    amLODIPine (NORVASC) 10 mg tablet    aspirin (ECOTRIN LOW STRENGTH) 81 mg EC tablet    Budesonide-Formoterol Fumarate (SYMBICORT IN)    calcitriol (ROCALTROL) 0 25 mcg capsule    chlorthalidone 25 mg tablet    clopidogrel (PLAVIX) 75 mg tablet    metoprolol tartrate (LOPRESSOR) 25 mg tablet    pantoprazole (PROTONIX) 40 mg tablet    pravastatin (PRAVACHOL) 40 mg tablet    Linagliptin (TRADJENTA) 5 MG TABS    lisinopril (ZESTRIL) 40 mg tablet    repaglinide (PRANDIN) 0 5 mg tablet    SODIUM BICARBONATE PO    tamsulosin (FLOMAX) 0 4 mg         multi-electrolyte 75 mL/hr Last Rate: Stopped (12/10/18 1959)   pantoprozole (PROTONIX) infusion (Continuous) 8 mg/hr Last Rate: 8 mg/hr (12/11/18 0101)       Assessment:  Principal Problem:    Melena  Active Problems:    COPD (chronic obstructive pulmonary disease) (HCC)    CAD (coronary artery disease)    Stage 3 chronic kidney disease (HCC)    DM2 (diabetes mellitus, type 2) ([facility])    Hypertension    Ischemic chest pain    Leukocytosis    Acute kidney injury ([facility] [address] )    Elevated lipase        Counseling / Coordination of Care  Total floor / unit time spent today 20 minutes  Greater than 50% of total time was spent with the patient and / or family counseling and / or coordination of care  ** Please Note: Dragon 360 Dictation voice to text software may have been used in the creation of this document   **

## 2018-12-11 NOTE — PLAN OF CARE
Problem: DISCHARGE PLANNING - CARE MANAGEMENT  Goal: Discharge to post-acute care or home with appropriate resources  INTERVENTIONS:  - Conduct assessment to determine patient/family and health care team treatment goals, and need for post-acute services based on payer coverage, community resources, and patient preferences, and barriers to discharge  - Address psychosocial, clinical, and financial barriers to discharge as identified in assessment in conjunction with the patient/family and health care team  - Arrange appropriate level of post-acute services according to patients   needs and preference and payer coverage in collaboration with the physician and health care team  - Communicate with and update the patient/family, physician, and health care team regarding progress on the discharge plan  - Arrange appropriate transportation to post-acute venues  Outcome: Progressing  LOS 2 days, Bundle Patient: No, 30 Day Readmission: No    CM met with pt at bedside  Pt is awake, alert, oriented  Pt stated he lives with his son in a one story home with 3 steps to enter  Pt stated he has a walker and a cane at home  Pt is independent with his ADL's  Pt denied VNA services in past but stated he has been in [facility] in past for STR  Pt's pharmacy is [facility] on [address]  Pt denied any history or hospitalization for MH, drug or alcohol abuse  Pt is retired and no longer drives  Pt's son is available to drive pt to appointments  Pt denies any DC needs  CM will continue to follow for possible O2 needs and any additional needs pt may have at DC  CM name and role reviewed  Discharge Checklist reviewed and CM will continue to monitor for progress toward discharge goals in nursing and provider rounds        CM reviewed discharge planning process including the following: identifying caregivers at home, preference for d/c planning needs, [facility] Meds to Bed program, availability of treatment team to discuss questions or concerns patient and/or family may have regarding diagnosis, plan of care, old or new medications and discharge planning   CM will continue to follow for care coordination and update assessment as necessary

## 2018-12-11 NOTE — CONSULTS
Consultation - Pulmonary Medicine   Lovett Cowden 66 y o  male MRN: [id number]  Unit/Bed#: -01 Encounter: [id number]      Assessment/Plan:    Acute hypoxic respiratory failure, multifactorial due to below   Titrate FiO2 to maintain saturations greater than or equal to 88%  Has been weaned off high-flow nasal cannula  Out of bed as tolerated  Add IS post-chest tube placement if done as below  Abnormal chest x-ray with right-sided apical pneumothorax and concern for right hilar density   Patient is asymptomatic from a pulmonary standpoint aside from hypoxia  Check CT of the chest now to evaluate: CT done and given size of pneumothorax, will need chest tube placement by IR  Order placed  Pending final results of CT chest, will delineate planned for concern of right hilar density  May need bronchoscopy for further evaluation once pneumothorax is addressed  COPD of unknown severity without acute exacerbation   Continue Symbicort and Xopenex TID  No needs for steroids at this time  He would benefit from outpatient pulmonary follow-up and PFTs  Acute blood loss anemia due to GI bleed in the setting of duodenal ulcer   Management per primary team and GI  Hemoglobin stable  Active tobacco abuse   Smoking cessation and nicotine replacement therapy  Outpatient follow-up and repeat imaging pending hospital course  Discussed with nursing and Internal Medicine  History of Present Illness   Physician Requesting Consult: Divya Diaz MD  Reason for Consult / Principal Problem: Hypoxia/Abnormal CXR  Hx and PE limited by: None  Chief Complaint: Dark Stools per notes  HPI: Lovett Cowden is a 66 y o   male who presented to [address] [place] [address] with complaints of black stools on 12/09/2018  EGD was done showing bleeding duodenal ulcer  Today, pulmonary is consulted due to finding of right pneumothorax  Harjit reports that his breathing is at baseline for him    He is not short of breath with short walks around his room, but is dyspneic at times with prolonged ambulation  This is his baseline  He has a baseline chronic cough productive of occasional white mucus  This is unchanged  He does not report any increased coughing  He denies chest tightness  He denies fevers, chills, or night sweats  After questioning, he does report he had an episode of chest pain last night while he was sitting and watching TV  He does not recall that he was doing anything at that time and had no significant coughing episodes  That symptom is now resolved  From a pulmonary standpoint, loose does not follow with a pulmonologist   He does have COPD and maintains on Symbicort and as needed albuterol  He does not wear oxygen at home  Inpatient consult to Pulmonology  Consult performed by: Monica Ireland ordered by: Reyes Castro        Review of Systems   All other systems reviewed and are negative  A full 12-point review of systems was completed and is negative except for those outlined in the HPI  Historical Information   Past Medical History:   Diagnosis Date    BPH (benign prostatic hyperplasia)     CKD (chronic kidney disease)     [facility]     Coronary artery disease     Diabetes mellitus ([facility] [address] )     History of heart attack     Hyperlipidemia     Hypertension     [facility]      Past Surgical History:   Procedure Laterality Date    APPENDECTOMY      BACK SURGERY      x2 - lumbar spine    ESOPHAGOGASTRODUODENOSCOPY N/A 12/10/2018    Procedure: ESOPHAGOGASTRODUODENOSCOPY (EGD); Surgeon: Som Sheehan MD;  [facility];   Service: Gastroenterology    VASCULAR SURGERY      stent     Social History   History   Alcohol Use No     History   Drug Use No     History   Smoking Status    Current Every Day Smoker    Packs/day: 0 50    Years: 60 00    Types: Cigarettes   Smokeless Tobacco    Never Used     Comment: Previously 1-2 ppd     Occupational History:  Retired  Lives with son  Family History:   Family History   Problem Relation Age of Onset    COPD Mother        Meds/Allergies   all current active meds have been reviewed, pertinent pulmonary meds have been reviewed, current meds:   Current Facility-Administered Medications   Medication Dose Route Frequency    albuterol inhalation solution 2 5 mg  2 5 mg Nebulization Q4H PRN    amLODIPine (NORVASC) tablet 5 mg  5 mg Oral Daily    budesonide-formoterol (SYMBICORT) 160-4 5 mcg/act inhaler 2 puff  2 puff Inhalation BID    clopidogrel (PLAVIX) tablet 75 mg  75 mg Oral Daily    insulin lispro (HumaLOG) 100 units/mL subcutaneous injection 1-5 Units  1-5 Units Subcutaneous TID AC    insulin lispro (HumaLOG) 100 units/mL subcutaneous injection 1-5 Units  1-5 Units Subcutaneous HS    levalbuterol (XOPENEX) inhalation solution 1 25 mg  1 25 mg Nebulization TID    metoprolol tartrate (LOPRESSOR) partial tablet 12 5 mg  12 5 mg Oral Q12H [facility]    nicotine (NICODERM CQ) 14 mg/24hr TD 24 hr patch 14 mg  14 mg Transdermal Daily    nitroglycerin (NITROSTAT) SL tablet 0 4 mg  0 4 mg Sublingual Q5 Min PRN    pantoprazole (PROTONIX) injection 40 mg  40 mg Intravenous Q12H    sodium chloride 0 9 % inhalation solution 3 mL  3 mL Nebulization TID    and PTA meds:   Prior to Admission Medications   Prescriptions Last Dose Informant Patient Reported? Taking?    Budesonide-Formoterol Fumarate (SYMBICORT IN)   Yes Yes   Sig: Inhale 2 Inhalers 2 (two) times a day     Linagliptin (TRADJENTA) 5 MG TABS   Yes No   Sig: Take 5 mg by mouth daily   SODIUM BICARBONATE PO   Yes No   Sig: Take by mouth   amLODIPine (NORVASC) 10 mg tablet   Yes Yes   Sig: Take 10 mg by mouth 2 (two) times a day     aspirin (ECOTRIN LOW STRENGTH) 81 mg EC tablet   Yes Yes   Sig: Take 81 mg by mouth daily   calcitriol (ROCALTROL) 0 25 mcg capsule   Yes Yes   Sig: Take 0 25 mcg by mouth 3 times per week   chlorthalidone 25 mg tablet   Yes Yes   Sig: Take 25 mg by mouth daily   clopidogrel (PLAVIX) 75 mg tablet   Yes Yes   Sig: Take 75 mg by mouth daily   lisinopril (ZESTRIL) 40 mg tablet   Yes No   Sig: Take 40 mg by mouth daily   metoprolol tartrate (LOPRESSOR) 25 mg tablet   Yes Yes   Sig: Take 25 mg by mouth every 12 (twelve) hours   pantoprazole (PROTONIX) 40 mg tablet   No Yes   Sig: Take 1 tablet (40 mg total) by mouth 2 (two) times a day before meals   pravastatin (PRAVACHOL) 40 mg tablet   Yes Yes   Sig: Take 40 mg by mouth daily   repaglinide (PRANDIN) 0 5 mg tablet   Yes No   Sig: Take 0 5 mg by mouth 3 (three) times a day before meals   tamsulosin (FLOMAX) 0 4 mg   Yes No   Sig: Take 0 4 mg by mouth daily with dinner      Facility-Administered Medications: None       Allergies   Allergen Reactions    Contrast [Iodinated Diagnostic Agents]        Objective   Vitals: Blood pressure 134/63, pulse 73, temperature 98 6 °F (37 °C), temperature source Oral, resp  rate 20, height 5' 2" (1 575 m), weight 63 4 kg (139 lb 12 4 oz), SpO2 96 %  6 liters nasal cannula,Body mass index is 25 56 kg/m²  Intake/Output Summary (Last 24 hours) at 12/11/18 1534  Last data filed at 12/11/18 0511   Gross per 24 hour   Intake          1701 91 ml   Output             1520 ml   Net           181 91 ml     Invasive Devices     Peripheral Intravenous Line            Peripheral IV 12/09/18 Left Forearm 2 days    Long-Dwell Peripheral IV (Midline) 51/59/54 Right Basilic 1 day                Physical Exam   Constitutional: He is oriented to person, place, and time  He appears well-developed and well-nourished  He is cooperative  Non-toxic appearance  No distress  HENT:   Head: Normocephalic and atraumatic  Mouth/Throat: No oropharyngeal exudate  Eyes: EOM are normal  No scleral icterus  Neck: Neck supple  No JVD present  No tracheal deviation present     Cardiovascular: Normal rate, regular rhythm, S1 normal and S2 normal   Exam reveals no gallop and no friction rub  No murmur heard  Pulmonary/Chest: Effort normal and breath sounds normal  No accessory muscle usage or stridor  No tachypnea  No respiratory distress  He has no decreased breath sounds  He has no wheezes  He has no rhonchi  He has no rales  He exhibits no tenderness  Abdominal: Soft  Bowel sounds are normal  He exhibits no distension  There is no tenderness  There is no rebound and no guarding  Musculoskeletal: He exhibits no edema or tenderness  Neurological: He is alert and oriented to person, place, and time  He has normal strength  GCS eye subscore is 4  GCS verbal subscore is 5  GCS motor subscore is 6  Skin: Skin is warm and dry  No abrasion, no ecchymosis, no lesion and no rash noted  He is not diaphoretic  No cyanosis or erythema  Nails show no clubbing  Psychiatric: He has a normal mood and affect  His speech is normal and behavior is normal    Vitals reviewed  Lab Results:  Procalcitonin less than 0 05    Blood cultures x2 showed no growth at 24 hours    CBC:   Lab Results   Component Value Date    WBC 15 91 (H) 12/11/2018    HGB 10 9 (L) 12/11/2018    HCT 31 9 (L) 12/11/2018    MCV 99 (H) 12/11/2018     12/11/2018    MCH 33 6 12/11/2018    MCHC 34 2 12/11/2018    RDW 15 4 (H) 12/11/2018    MPV 11 1 12/11/2018    NRBC 0 12/11/2018     Lab Results   Component Value Date    CALCIUM 8 5 12/10/2018    K 3 7 12/10/2018    CO2 21 12/10/2018     12/10/2018    BUN 42 (H) 12/10/2018    CREATININE 1 75 (H) 12/10/2018     Troponin:   Lab Results   Component Value Date    TROPONINI 0 35 (H) 12/10/2018     Imaging Studies: I have personally reviewed pertinent reports   , I have personally reviewed pertinent films in PACS and Chest x-ray done on 12/09/2018 shows questionable asymmetric density of the right hilar region; chest x-ray done on 12/10 in the a m  Unchanged; chest x-ray done on 12/10 in the p m   Showed persistent right middle lobe opacification with suspicion of small right apical pneumothorax no more than 5%; chest x-ray done this morning shows increase of right apical pneumothorax to approximately 20%; CT chest pending    EKG, Pathology, and Other Studies: None    Pulmonary Results (PFTs, PSG): None    VTE Prophylaxis: Sequential compression device (Venodyne)  and RX contraindicated due to: GI bleed    Code Status: Level 1 - Full Code    None    Portions of the record may have been created with voice recognition software  Occasional wrong word or "sound a like" substitutions may have occurred due to the inherent limitations of voice recognition software  Read the chart carefully and recognize, using context, where substitutions have occurred

## 2018-12-11 NOTE — PROGRESS NOTES
Progress Note - Marianne Carrion 66 y o  male MRN: [id number]    Unit/Bed#: -01 Encounter: [id number]    Assessment and Plan:   Principal Problem:    Melena  Active Problems:    Stage 3 chronic kidney disease (HCC)    DM2 (diabetes mellitus, type 2) (HCC)    COPD (chronic obstructive pulmonary disease) (HCC)    Hypertension    CAD (coronary artery disease)    Ischemic chest pain    Leukocytosis    Acute kidney injury ([address] )    Elevated lipase    #1  Acute blood loss anemia and melena secondary to bleeding duodenal ulcer:  Status post EGD yesterday which showed multiple duodenal ulcers with a large ulcer that was deep with visible vessel status post epinephrine and gold probe with ablation of vessel and no further bleeding  Hemoglobin was stable this morning  Patient did have a dark tarry bowel movement today however this is likely old blood since he has not moved his bowels since Sunday   -continue to monitor hemoglobin and transfuse as necessary  Follow-up repeat hemoglobin after tarry bowel movement  -can continue Plavix for now  -PPI b i d   -continue nonulcerogenic diet for now   -will need repeat upper endoscopy as an outpatient for further evaluation, biopsies, and to confirm healing  Ideally within the next 2 months but timing will also be determined based on when anticoagulation/antiplatelet can be held if he has any intervention from a cardiac standpoint     ----------------------------------------------------------------------------------------------------------------    Subjective:     Denies any nausea, vomiting, abdominal pain  Chest pain has improved he has not had any today  He did have a dark tarry bowel movement that I witnessed  There was no signs of active bright red blood  Objective:     Vitals: Blood pressure 159/79, pulse 77, temperature 99 1 °F (37 3 °C), temperature source Oral, resp  rate 16, height 5' 2" (1 575 m), weight 63 4 kg (139 lb 12 4 oz), SpO2 92 %  ,Body mass index is 25 56 kg/m²  Intake/Output Summary (Last 24 hours) at 12/11/18 7818  Last data filed at 12/11/18 0511   Gross per 24 hour   Intake          1851 91 ml   Output             1620 ml   Net           231 91 ml       Physical Exam:     General Appearance: Alert, appears stated age and cooperative  Lungs:  Decreased breath sounds with rhonchi bilaterally, no rales, no labored breathing/accessory muscle use  Heart: Regular rate and rhythm, S1, S2 normal, no murmur, click, rub or gallop  Abdomen: Soft, non-tender, non-distended; bowel sounds normal; no masses or no organomegaly  Extremities: No cyanosis, clubbing, or edema    Invasive Devices     Peripheral Intravenous Line            Peripheral IV 12/09/18 Left Forearm 1 day    Long-Dwell Peripheral IV (Midline) 68/45/07 Right Basilic less than 1 day                Lab Results:    Results from last 7 days  Lab Units 12/11/18  0512  12/10/18  0452   WBC Thousand/uL  --   --  14 30*   HEMOGLOBIN g/dL 10 0*  < > 8 8*   HEMATOCRIT %  --   --  26 2*   PLATELETS Thousands/uL  --   --  212   NEUTROS PCT %  --   --  71   LYMPHS PCT %  --   --  17   MONOS PCT %  --   --  7   EOS PCT %  --   --  2   < > = values in this interval not displayed  Results from last 7 days  Lab Units 12/10/18  0452   POTASSIUM mmol/L 3 7   CHLORIDE mmol/L 108   CO2 mmol/L 21   BUN mg/dL 42*   CREATININE mg/dL 1 75*   CALCIUM mg/dL 8 5   ALK PHOS U/L 49   ALT U/L 20   AST U/L 17       Results from last 7 days  Lab Units 12/09/18  1037   INR  1 05       Results from last 7 days  Lab Units 12/09/18  1037   LIPASE u/L 422*       Imaging Studies: I have personally reviewed pertinent imaging studies  Ct Abdomen Pelvis Wo Contrast    Result Date: 12/9/2018  Impression: Limited exam without IV and oral contrast  1   Main pancreatic duct appears focally dilated at the head  Recommend follow-up with nonemergent MRCP or ERCP for further evaluation to exclude underlying lesion    No obvious findings of pancreatitis based on this limited exam  2   Colonic diverticulosis without acute diverticulitis  3   Stable hyperdense left renal lesion  Recommend follow-up with nonemergent ultrasound  The study was marked in EPIC for significant notification  Workstation performed: [id number]     Xr Chest Portable    Result Date: 12/10/2018  Impression: Consolidation and atelectasis in the right middle lobe  Workstation performed: [id number]     Xr Chest 1 View Portable    Result Date: 12/9/2018  Impression: Asymmetric density noted in the right hilar region  Question focal consolidation or mass  Recommend follow-up with contrast chest CT with IV contrast if feasible  The study was marked in EPIC for significant notification   Workstation performed: [id number]

## 2018-12-11 NOTE — PROGRESS NOTES
Patient had dark, bloody stools  SLIM paged  SLIM wants GI input  GI paged  GI came to the floor to assess the patient  GI said they believe that it is old blood and that we need to draw labs  Will continue to monitor

## 2018-12-11 NOTE — SOCIAL WORK
LOS 2 days, Bundle Patient: No, 30 Day Readmission: No    CM met with pt at bedside  Pt is awake, alert, oriented  Pt stated he lives with his son in a one story home with 3 steps to enter  Pt stated he has a walker and a cane at home  Pt is independent with his ADL's  Pt denied VNA services in past but stated he has been in [facility] in past for STR  Pt's pharmacy is [facility] [address]  Pt denied any history or hospitalization for MH, drug or alcohol abuse  Pt is retired and no longer drives  Pt's son is available to drive pt to appointments  Pt denies any DC needs  CM will continue to follow for possible O2 needs and any additional needs pt may have at DC  CM name and role reviewed  Discharge Checklist reviewed and CM will continue to monitor for progress toward discharge goals in nursing and provider rounds  CM reviewed discharge planning process including the following: identifying caregivers at home, preference for d/c planning needs, [facility] Meds to Bed program, availability of treatment team to discuss questions or concerns patient and/or family may have regarding diagnosis, plan of care, old or new medications and discharge planning   CM will continue to follow for care coordination and update assessment as necessary

## 2018-12-11 NOTE — PROGRESS NOTES
Radiology Queenie Ohara) called and said there are significant findings on the CT of the chest  SLIM notified

## 2018-12-11 NOTE — PROGRESS NOTES
Pt continuous pulse O2 keeps reading low  RN is working with respiratory and SLIM to maintain O2 above 88%  Pt has tried hi flow nasal cannula and has refused it due to discomfort  RN will continue working with respiratory to maintain stats  Will continue to monitor

## 2018-12-11 NOTE — PROGRESS NOTES
Pt complained of chest pain being 10/10 in mid chest region  RN paged SLIM and was ordered to d/c both fluids and follow protocol with EKG and medication  RN will continue to monitor

## 2018-12-12 ENCOUNTER — APPOINTMENT (INPATIENT)
Dept: RADIOLOGY | Facility: HOSPITAL | Age: 78
DRG: 377 | End: 2018-12-12
Payer: COMMERCIAL

## 2018-12-12 ENCOUNTER — TELEPHONE (OUTPATIENT)
Dept: GASTROENTEROLOGY | Facility: AMBULARY SURGERY CENTER | Age: 78
End: 2018-12-12

## 2018-12-12 PROBLEM — I47.2 NSVT (NONSUSTAINED VENTRICULAR TACHYCARDIA) (HCC): Status: ACTIVE | Noted: 2018-12-12

## 2018-12-12 LAB
ANION GAP SERPL CALCULATED.3IONS-SCNC: 12 MMOL/L (ref 4–13)
BASOPHILS # BLD AUTO: 0.03 THOUSANDS/ΜL (ref 0–0.1)
BASOPHILS NFR BLD AUTO: 0 % (ref 0–1)
BUN SERPL-MCNC: 41 MG/DL (ref 5–25)
CALCIUM SERPL-MCNC: 8.7 MG/DL (ref 8.3–10.1)
CHLORIDE SERPL-SCNC: 102 MMOL/L (ref 100–108)
CO2 SERPL-SCNC: 24 MMOL/L (ref 21–32)
CREAT SERPL-MCNC: 2.07 MG/DL (ref 0.6–1.3)
EOSINOPHIL # BLD AUTO: 0.19 THOUSAND/ΜL (ref 0–0.61)
EOSINOPHIL NFR BLD AUTO: 2 % (ref 0–6)
ERYTHROCYTE [DISTWIDTH] IN BLOOD BY AUTOMATED COUNT: 15.1 % (ref 11.6–15.1)
GFR SERPL CREATININE-BSD FRML MDRD: 30 ML/MIN/1.73SQ M
GLUCOSE SERPL-MCNC: 234 MG/DL (ref 65–140)
GLUCOSE SERPL-MCNC: 264 MG/DL (ref 65–140)
GLUCOSE SERPL-MCNC: 323 MG/DL (ref 65–140)
GLUCOSE SERPL-MCNC: 340 MG/DL (ref 65–140)
GLUCOSE SERPL-MCNC: 350 MG/DL (ref 65–140)
HCT VFR BLD AUTO: 30.4 % (ref 36.5–49.3)
HGB BLD-MCNC: 10.3 G/DL (ref 12–17)
IMM GRANULOCYTES # BLD AUTO: 0.14 THOUSAND/UL (ref 0–0.2)
IMM GRANULOCYTES NFR BLD AUTO: 1 % (ref 0–2)
LYMPHOCYTES # BLD AUTO: 1.6 THOUSANDS/ΜL (ref 0.6–4.47)
LYMPHOCYTES NFR BLD AUTO: 16 % (ref 14–44)
MAGNESIUM SERPL-MCNC: 1.7 MG/DL (ref 1.6–2.6)
MCH RBC QN AUTO: 33.3 PG (ref 26.8–34.3)
MCHC RBC AUTO-ENTMCNC: 33.9 G/DL (ref 31.4–37.4)
MCV RBC AUTO: 98 FL (ref 82–98)
MONOCYTES # BLD AUTO: 1.03 THOUSAND/ΜL (ref 0.17–1.22)
MONOCYTES NFR BLD AUTO: 10 % (ref 4–12)
NEUTROPHILS # BLD AUTO: 7.33 THOUSANDS/ΜL (ref 1.85–7.62)
NEUTS SEG NFR BLD AUTO: 71 % (ref 43–75)
NRBC BLD AUTO-RTO: 0 /100 WBCS
PLATELET # BLD AUTO: 213 THOUSANDS/UL (ref 149–390)
PMV BLD AUTO: 11.2 FL (ref 8.9–12.7)
POTASSIUM SERPL-SCNC: 3 MMOL/L (ref 3.5–5.3)
RBC # BLD AUTO: 3.09 MILLION/UL (ref 3.88–5.62)
SODIUM SERPL-SCNC: 138 MMOL/L (ref 136–145)
WBC # BLD AUTO: 10.32 THOUSAND/UL (ref 4.31–10.16)

## 2018-12-12 PROCEDURE — 82948 REAGENT STRIP/BLOOD GLUCOSE: CPT

## 2018-12-12 PROCEDURE — 94760 N-INVAS EAR/PLS OXIMETRY 1: CPT

## 2018-12-12 PROCEDURE — 99232 SBSQ HOSP IP/OBS MODERATE 35: CPT | Performed by: INTERNAL MEDICINE

## 2018-12-12 PROCEDURE — 85025 COMPLETE CBC W/AUTO DIFF WBC: CPT | Performed by: INTERNAL MEDICINE

## 2018-12-12 PROCEDURE — 71045 X-RAY EXAM CHEST 1 VIEW: CPT

## 2018-12-12 PROCEDURE — 94640 AIRWAY INHALATION TREATMENT: CPT

## 2018-12-12 PROCEDURE — C9113 INJ PANTOPRAZOLE SODIUM, VIA: HCPCS | Performed by: PHYSICIAN ASSISTANT

## 2018-12-12 PROCEDURE — 80048 BASIC METABOLIC PNL TOTAL CA: CPT | Performed by: NURSE PRACTITIONER

## 2018-12-12 PROCEDURE — 94762 N-INVAS EAR/PLS OXIMTRY CONT: CPT

## 2018-12-12 PROCEDURE — 83735 ASSAY OF MAGNESIUM: CPT | Performed by: INTERNAL MEDICINE

## 2018-12-12 RX ORDER — POTASSIUM CHLORIDE 20 MEQ/1
40 TABLET, EXTENDED RELEASE ORAL ONCE
Status: COMPLETED | OUTPATIENT
Start: 2018-12-12 | End: 2018-12-12

## 2018-12-12 RX ORDER — INSULIN GLARGINE 100 [IU]/ML
6 INJECTION, SOLUTION SUBCUTANEOUS EVERY MORNING
Status: DISCONTINUED | OUTPATIENT
Start: 2018-12-12 | End: 2018-12-12

## 2018-12-12 RX ORDER — INSULIN GLARGINE 100 [IU]/ML
10 INJECTION, SOLUTION SUBCUTANEOUS EVERY MORNING
Status: DISCONTINUED | OUTPATIENT
Start: 2018-12-13 | End: 2018-12-13

## 2018-12-12 RX ORDER — MAGNESIUM SULFATE HEPTAHYDRATE 40 MG/ML
2 INJECTION, SOLUTION INTRAVENOUS ONCE
Status: COMPLETED | OUTPATIENT
Start: 2018-12-12 | End: 2018-12-12

## 2018-12-12 RX ORDER — INSULIN GLARGINE 100 [IU]/ML
4 INJECTION, SOLUTION SUBCUTANEOUS ONCE
Status: COMPLETED | OUTPATIENT
Start: 2018-12-12 | End: 2018-12-12

## 2018-12-12 RX ORDER — PANTOPRAZOLE SODIUM 40 MG/1
40 TABLET, DELAYED RELEASE ORAL
Status: DISCONTINUED | OUTPATIENT
Start: 2018-12-12 | End: 2018-12-16 | Stop reason: HOSPADM

## 2018-12-12 RX ORDER — POTASSIUM CHLORIDE 20 MEQ/1
20 TABLET, EXTENDED RELEASE ORAL ONCE
Status: COMPLETED | OUTPATIENT
Start: 2018-12-12 | End: 2018-12-12

## 2018-12-12 RX ADMIN — POTASSIUM CHLORIDE 20 MEQ: 1500 TABLET, EXTENDED RELEASE ORAL at 13:07

## 2018-12-12 RX ADMIN — POTASSIUM CHLORIDE 40 MEQ: 1500 TABLET, EXTENDED RELEASE ORAL at 08:35

## 2018-12-12 RX ADMIN — CLOPIDOGREL 75 MG: 75 TABLET, FILM COATED ORAL at 08:18

## 2018-12-12 RX ADMIN — BUDESONIDE AND FORMOTEROL FUMARATE DIHYDRATE 2 PUFF: 160; 4.5 AEROSOL RESPIRATORY (INHALATION) at 08:18

## 2018-12-12 RX ADMIN — METOPROLOL TARTRATE 25 MG: 25 TABLET, FILM COATED ORAL at 21:50

## 2018-12-12 RX ADMIN — BUDESONIDE AND FORMOTEROL FUMARATE DIHYDRATE 2 PUFF: 160; 4.5 AEROSOL RESPIRATORY (INHALATION) at 17:27

## 2018-12-12 RX ADMIN — ISODIUM CHLORIDE 3 ML: 0.03 SOLUTION RESPIRATORY (INHALATION) at 08:11

## 2018-12-12 RX ADMIN — INSULIN LISPRO 3 UNITS: 100 INJECTION, SOLUTION INTRAVENOUS; SUBCUTANEOUS at 13:08

## 2018-12-12 RX ADMIN — LEVALBUTEROL HYDROCHLORIDE 1.25 MG: 1.25 SOLUTION, CONCENTRATE RESPIRATORY (INHALATION) at 14:37

## 2018-12-12 RX ADMIN — PANTOPRAZOLE SODIUM 40 MG: 40 TABLET, DELAYED RELEASE ORAL at 16:24

## 2018-12-12 RX ADMIN — LEVALBUTEROL HYDROCHLORIDE 1.25 MG: 1.25 SOLUTION, CONCENTRATE RESPIRATORY (INHALATION) at 19:28

## 2018-12-12 RX ADMIN — INSULIN LISPRO 2 UNITS: 100 INJECTION, SOLUTION INTRAVENOUS; SUBCUTANEOUS at 08:27

## 2018-12-12 RX ADMIN — AMLODIPINE BESYLATE 5 MG: 5 TABLET ORAL at 08:18

## 2018-12-12 RX ADMIN — INSULIN LISPRO 3 UNITS: 100 INJECTION, SOLUTION INTRAVENOUS; SUBCUTANEOUS at 16:25

## 2018-12-12 RX ADMIN — LEVALBUTEROL HYDROCHLORIDE 1.25 MG: 1.25 SOLUTION, CONCENTRATE RESPIRATORY (INHALATION) at 08:11

## 2018-12-12 RX ADMIN — METOPROLOL TARTRATE 12.5 MG: 25 TABLET ORAL at 08:17

## 2018-12-12 RX ADMIN — ISODIUM CHLORIDE 3 ML: 0.03 SOLUTION RESPIRATORY (INHALATION) at 14:37

## 2018-12-12 RX ADMIN — INSULIN LISPRO 3 UNITS: 100 INJECTION, SOLUTION INTRAVENOUS; SUBCUTANEOUS at 21:52

## 2018-12-12 RX ADMIN — INSULIN LISPRO 3 UNITS: 100 INJECTION, SOLUTION INTRAVENOUS; SUBCUTANEOUS at 16:24

## 2018-12-12 RX ADMIN — ISODIUM CHLORIDE 3 ML: 0.03 SOLUTION RESPIRATORY (INHALATION) at 19:28

## 2018-12-12 RX ADMIN — PANTOPRAZOLE SODIUM 40 MG: 40 INJECTION, POWDER, FOR SOLUTION INTRAVENOUS at 08:18

## 2018-12-12 RX ADMIN — INSULIN GLARGINE 6 UNITS: 100 INJECTION, SOLUTION SUBCUTANEOUS at 08:35

## 2018-12-12 RX ADMIN — INSULIN GLARGINE 4 UNITS: 100 INJECTION, SOLUTION SUBCUTANEOUS at 13:07

## 2018-12-12 RX ADMIN — MAGNESIUM SULFATE HEPTAHYDRATE 2 G: 40 INJECTION, SOLUTION INTRAVENOUS at 09:25

## 2018-12-12 RX ADMIN — POTASSIUM CHLORIDE 40 MEQ: 1500 TABLET, EXTENDED RELEASE ORAL at 09:24

## 2018-12-12 NOTE — PLAN OF CARE
DISCHARGE PLANNING     Discharge to home or other facility with appropriate resources Progressing        DISCHARGE PLANNING - CARE MANAGEMENT     Discharge to post-acute care or home with appropriate resources Progressing        GASTROINTESTINAL - ADULT     Maintains or returns to baseline bowel function Progressing        METABOLIC, FLUID AND ELECTROLYTES - ADULT     Electrolytes maintained within normal limits Progressing     Fluid balance maintained Progressing     Glucose maintained within target range Progressing        Nutrition/Hydration-ADULT     Nutrient/Hydration intake appropriate for improving, restoring or maintaining nutritional needs Progressing        PAIN - ADULT     Verbalizes/displays adequate comfort level or baseline comfort level Progressing        Potential for Falls     Patient will remain free of falls Progressing        Prexisting or High Potential for Compromised Skin Integrity     Skin integrity is maintained or improved Progressing        SAFETY ADULT     Maintain or return to baseline ADL function Progressing     Maintain or return mobility status to optimal level Progressing

## 2018-12-12 NOTE — PROGRESS NOTES
The pantoprazole has  been converted to Oral per [facility] IV-to-PO Auto-Conversion Protocol for Adults as approved by the Pharmacy and Therapeutics Committee  The patient met all eligible criteria:  3 Age = 25years old   2) Received at least one dose of the IV form   3) Receiving at least one other scheduled oral/enteral medication   4) Tolerating an oral/enteral diet   and did not have any exclusions:   1) Critical care patient   2) Active GI bleed (IF assessing H2RAs or PPIs)   3) Continuous tube feeding (IF assessing cipro, doxycycline, levofloxacin, minocycline, rifampin, or voriconazole)   4) Receiving PO vancomycin (IF assessing metronidazole)   5) Persistent nausea and/or vomiting   6) Ileus or gastrointestinal obstruction   7) Polo/nasogastric tube set for continuous suction   8) Specific order not to automatically convert to PO (in the order's comments or if discussed in the most recent Infectious Disease or primary team's progress notes)

## 2018-12-12 NOTE — PROGRESS NOTES
Progress Note - Pulmonary   Cathie Organ 66 y o  male MRN: [id number]  Unit/Bed#: -01 Encounter: [id number]    Assessment/Plan:    Acute hypoxic respiratory failure, multifactorial due to below - improved              Titrate FiO2 to maintain saturations greater than or equal to 88%  Out of bed as tolerated  Continue IS      Abnormal chest x-ray/CT Chest with Right Hydropneumothorax S/P CT 12/12/18 and scattered parenchymal opacities (atelectasis versus pneumonitis) with 4 mm RENETTA nodule   Check CXR this AM: done and noted below  Place CT to -20 cm water seal now  Repeat CXR  1500 today  Will need repeat CT Chest ~ 8-12 weeks      Emphysema/COPD of unknown severity without acute exacerbation              Continue Symbicort and Xopenex TID  No needs for steroids at this time  He would benefit from outpatient pulmonary follow-up and PFTs     Acute blood loss anemia due to GI bleed in the setting of duodenal ulcer              Management per primary team and GI  Hemoglobin stable      Active tobacco abuse              Smoking cessation and nicotine replacement therapy      Outpatient follow-up and repeat imaging pending hospital course  Discussed with nursing and Internal Medicine  Chief Complaint:    "I have some pain where they put my tube "    Subjective:    Harjit reports he is feeling good aside from some tenderness/pain around his CT site  He denies SOB or cough different from baseline  He denies anterior chest pain  He denies any other complaints  Objective:    Vitals: Blood pressure 154/64, pulse 74, temperature 98 3 °F (36 8 °C), temperature source Oral, resp  rate 18, height 5' 2" (1 575 m), weight 62 7 kg (138 lb 3 2 oz), SpO2 96 %  3L NC,Body mass index is 25 28 kg/m²        Intake/Output Summary (Last 24 hours) at 12/12/18 0908  Last data filed at 12/12/18 0400   Gross per 24 hour   Intake            172 5 ml   Output 325 ml   Net           -152 5 ml       Invasive Devices     Peripheral Intravenous Line            Peripheral IV 12/09/18 Left Forearm 2 days    Long-Dwell Peripheral IV (Midline) 01/53/97 Right Basilic 1 day          Drain            Chest Tube 1 Anterior Midaxillary 8 Fr  less than 1 day                Physical Exam:     Physical Exam   Constitutional: He is oriented to person, place, and time  He appears well-developed  He is cooperative  Non-toxic appearance  No distress  HENT:   Head: Normocephalic and atraumatic  Mouth/Throat: No oropharyngeal exudate  Eyes: EOM are normal  No scleral icterus  Neck: Neck supple  No JVD present  No tracheal deviation present  Cardiovascular: Normal rate, regular rhythm, S1 normal and S2 normal   Exam reveals no gallop and no friction rub  No murmur heard  Pulmonary/Chest: Effort normal and breath sounds normal  No accessory muscle usage or stridor  No tachypnea  No respiratory distress  He has no decreased breath sounds  He has no wheezes  He has no rhonchi  He has no rales  He exhibits no tenderness  Right apical CT in place with dressing intact  No airleak or crepitus  Abdominal: Soft  Bowel sounds are normal  He exhibits no distension  There is no tenderness  There is no rebound and no guarding  Musculoskeletal: He exhibits no edema or tenderness  Neurological: He is alert and oriented to person, place, and time  He has normal strength  GCS eye subscore is 4  GCS verbal subscore is 5  GCS motor subscore is 6  Skin: Skin is warm and dry  No abrasion, no ecchymosis, no lesion and no rash noted  He is not diaphoretic  No cyanosis or erythema  Nails show no clubbing  Psychiatric: He has a normal mood and affect  His speech is normal and behavior is normal    Vitals reviewed        Labs:   CBC:   Lab Results   Component Value Date    WBC 10 32 (H) 12/12/2018    HGB 10 3 (L) 12/12/2018    HCT 30 4 (L) 12/12/2018    MCV 98 12/12/2018     12/12/2018    MCH 33 3 12/12/2018    MCHC 33 9 12/12/2018    RDW 15 1 12/12/2018    MPV 11 2 12/12/2018    NRBC 0 12/12/2018   , CMP:   Lab Results   Component Value Date    SODIUM 138 12/12/2018    K 3 0 (L) 12/12/2018     12/12/2018    CO2 24 12/12/2018    BUN 41 (H) 12/12/2018    CREATININE 2 07 (H) 12/12/2018    CALCIUM 8 7 12/12/2018    EGFR 30 12/12/2018     Procalcitonin 0 12    Imaging and other studies: I have personally reviewed pertinent reports   , I have personally reviewed pertinent films in PACS and CT Chest shows moderate right hydropneumothorax with scattered multifocal parenchymal opacities, emphysema, and 4 mm fissural nodule RENETTA   CXR done today shows resolution of right pneumothorax

## 2018-12-12 NOTE — ASSESSMENT & PLAN NOTE
· Acute upper GI bleed due to duodenal ulcer with visible vessel treated endoscopically  · No recurrence of bleed at present  · Hemoglobin stable  · Continue IV Protonix 40 mg twice daily  · Monitor hemoglobin  · Cleared by Gastroenterology to restart Plavix and for cardiac evaluation

## 2018-12-12 NOTE — PROGRESS NOTES
Patient displaying 25 beat run of VTach on telemetry monitor  VS stable, patient denies any chest pain  SLIM notified, no new orders at this time

## 2018-12-12 NOTE — PROGRESS NOTES
Progress Note - Cathie Organ 1940, 66 y o  male MRN: [id number]    Unit/Bed#: -01 Encounter: [id number]    Primary Care Provider: Rona Erickson MD   Date and time admitted to hospital: 12/9/2018 10:10 AM        GI bleed   Assessment & Plan    · Acute upper GI bleed due to duodenal ulcer with visible vessel treated endoscopically  · No recurrence of bleed at present  · Hemoglobin stable  · Continue IV Protonix 40 mg twice daily  · Monitor hemoglobin  · Cleared by Gastroenterology to restart Plavix and for cardiac evaluation     Chest pain   Assessment & Plan    · Felt to be likely due to anemia in the setting of coronary artery disease per Cardiology  · Elevated troponin to 0 4 felt to be likely a type 2 non STEMI due to acute anemia  · Transfuse to keep hemoglobin greater than 9  · Plavix restarted today  · Cardiology following - input appreciated     Pneumothorax   Assessment & Plan    · Right-sided apical pneumothorax  · Status post chest tube placement by IR  · Pulmonology input appreciated     Atelectasis of right lung   Assessment & Plan    · Collapse of right middle lobe  · If persistent on repeat imaging may need bronchoscopy     Acute respiratory failure with hypoxia (HCC)   Assessment & Plan    · Due to above  + volume overload  · S/p IV lasix 20 mg x 2  · S/p chest tube  · Ct O2 to maintain sats greater than or equal to 88%     Acute kidney injury ([facility] [address] )   Assessment & Plan    · CKD-3 with baseline creatinine 1 7 to 2  · Creatinine improved to 1 75  · Monitor     CAD (coronary artery disease)   Assessment & Plan    · Plavix 75 mg daily restarted today  · Continue low-dose beta-blocker     Hypertension   Assessment & Plan    · Low-dose amlodipine restarted along with low-dose metoprolol  · Monitor BP     DM2 (diabetes mellitus, type 2) ([address] )   Assessment & Plan    Lab Results   Component Value Date    HGBA1C 8 7 (H) 12/10/2018       Recent Labs      12/11/18   0510  12/11/18   3946 18   1106  18   1705   POCGLU  167*  161*  228*  222*       Blood Sugar Average: Last 72 hrs:  (P) 156 3    Oral medications held  Continue SSI         VTE Pharmacologic Prophylaxis:   Pharmacologic: Pharmacologic VTE Prophylaxis contraindicated due to Acute GI bleed  Mechanical VTE Prophylaxis in Place: Yes    Patient Centered Rounds: I have performed bedside rounds with nursing staff today  Discussions with Specialists or Other Care Team Provider:  Discussed with Gastroenterology and pulmonology    Education and Discussions with Family / Patient:  Discussed with son on the phone    Time Spent for Care: 45 minutes  More than 50% of total time spent on counseling and coordination of care as described above  Current Length of Stay: 2 day(s)    Current Patient Status: Inpatient   Certification Statement: The patient will continue to require additional inpatient hospital stay due to Acute pneumothorax with GI bleed and chest pain likely ischemic    Code Status: Level 1 - Full Code    Subjective:   Had 1 bowel movement earlier today day with dark blood  No abdominal pain  No nausea or vomiting  No shortness of breath  Chest imaging showed right-sided pneumothorax and he had a chest tube placed by Interventional Radiology    Objective:     Vitals:   Temp (24hrs), Av 3 °F (36 8 °C), Min:97 5 °F (36 4 °C), Max:99 1 °F (37 3 °C)    Temp:  [97 5 °F (36 4 °C)-99 1 °F (37 3 °C)] 98 4 °F (36 9 °C)  HR:  [73-91] 75  Resp:  [16-20] 20  BP: (134-172)/(54-79) 149/67  SpO2:  [87 %-97 %] 92 %  Body mass index is 25 56 kg/m²  Physical Exam:     Physical Exam   Constitutional: He is oriented to person, place, and time  HENT:   Head: Atraumatic  Eyes: EOM are normal    Neck: Neck supple  No JVD present  No tracheal deviation present  No thyromegaly present  Cardiovascular: Normal rate, regular rhythm and normal heart sounds      Pulmonary/Chest: Effort normal and breath sounds normal  No respiratory distress  He has no wheezes  He has no rales  Abdominal: Soft  Bowel sounds are normal  He exhibits no distension  There is no tenderness  There is no rebound  Musculoskeletal: He exhibits no edema  Neurological: He is alert and oriented to person, place, and time  Skin: Skin is warm and dry  Psychiatric: He has a normal mood and affect  Additional Data:     Labs:      Results from last 7 days  Lab Units 12/11/18  2019 12/11/18  1259   WBC Thousand/uL  --  15 91*   HEMOGLOBIN g/dL 9 9* 10 9*   HEMATOCRIT %  --  31 9*   PLATELETS Thousands/uL  --  246   NEUTROS PCT %  --  77*   LYMPHS PCT %  --  11*   MONOS PCT %  --  9   EOS PCT %  --  1       Results from last 7 days  Lab Units 12/10/18  0452   SODIUM mmol/L 140   POTASSIUM mmol/L 3 7   CHLORIDE mmol/L 108   CO2 mmol/L 21   BUN mg/dL 42*   CREATININE mg/dL 1 75*   ANION GAP mmol/L 11   CALCIUM mg/dL 8 5   ALBUMIN g/dL 2 6*   TOTAL BILIRUBIN mg/dL 0 30   ALK PHOS U/L 49   ALT U/L 20   AST U/L 17   GLUCOSE RANDOM mg/dL 132       Results from last 7 days  Lab Units 12/09/18  1037   INR  1 05       Results from last 7 days  Lab Units 12/11/18  1705 12/11/18  1106 12/11/18  0826 12/11/18  0510 12/11/18  0015 12/10/18  1206 12/10/18  0553 12/09/18  2358 12/09/18  1928 12/09/18  1615   POC GLUCOSE mg/dl 222* 228* 161* 167* 198* 105 150* 106 103 123       Results from last 7 days  Lab Units 12/10/18  1247   HEMOGLOBIN A1C % 8 7*       Results from last 7 days  Lab Units 12/11/18  1259 12/10/18  1247 12/09/18  1351 12/09/18  1140   LACTIC ACID mmol/L  --   --  0 9 3 1*   PROCALCITONIN ng/ml 0 12 <0 05 0 06  --        * I Have Reviewed All Lab Data Listed Above  * Additional Pertinent Lab Tests Reviewed: Roberto 66 Admission Reviewed    Recent Cultures (last 7 days):       Results from last 7 days  Lab Units 12/09/18  1140   BLOOD CULTURE  No Growth at 48 hrs  No Growth at 48 hrs         Last 24 Hours Medication List:     Current Facility-Administered Medications:  albuterol 2 5 mg Nebulization Q4H PRN Miracle Gonzalez MD   amLODIPine 5 mg Oral Daily Gaurav Fort, CRKARENA   budesonide-formoterol 2 puff Inhalation BID Tatiana Benitez PA-C   clopidogrel 75 mg Oral Daily Gaurav Fort, CRNP   insulin lispro 1-5 Units Subcutaneous TID [facility] James Clemente MD   insulin lispro 1-5 Units Subcutaneous HS James Clemente MD   levalbuterol 1 25 mg Nebulization TID Miracle Gonzalez MD   metoprolol tartrate 12 5 mg Oral Q12H [address] [place] [address], NP   nicotine 14 mg Transdermal Daily Tatiana Benitez PA-C   nitroglycerin 0 4 mg Sublingual Q5 Min PRN Diane Wharton PA-C   pantoprazole 40 mg Intravenous Q12H Marisel Munroe PA-C   sodium chloride 3 mL Nebulization TID Miracle Gonzalez MD        Today, Patient Was Seen By: James Clemente MD    ** Please Note: Dictation voice to text software may have been used in the creation of this document   **

## 2018-12-12 NOTE — ASSESSMENT & PLAN NOTE
· Felt to be likely due to anemia in the setting of coronary artery disease per Cardiology  · Elevated troponin to 0 4 felt to be likely a type 2 non STEMI due to acute anemia  · Transfuse to keep hemoglobin greater than 9  · Plavix restarted today  · Cardiology following - input appreciated

## 2018-12-12 NOTE — PROGRESS NOTES
Cardiology Progress Note - Donavan Cox 66 y o  male MRN: [id number]    Unit/Bed#: -01 Encounter: [id number]      Assessment/Recommendations:  1  NSVT: K very low this morning - replete to keep >4  Continue B-blocker - will uptitrate  2   Chest pain: no further episodes overnight  Perhaps related to anemia in setting of underlying CAD  Hgb now stable and asymptomatic  3   Type 2 NSTEMI: due to acute anemia and underlying CAD  4   CAD: with prior inferior MI, also confirmed on echo  For now, will continue on plavix and B-blocker  Will need eventual ASA to be started and outpatient ischemic evaluation including likely cardiac cath  5   HTN: elevated this AM, will uptitrate B-blocker  6   HLD: continued on statin  Subjective:   Patient seen and examined  No significant events overnight   ; pertinent negatives - chest pain, chest pressure/discomfort, dyspnea, exertional chest pressure/discomfort, irregular heart beat and palpitations  Objective:     Vitals: Blood pressure 154/64, pulse 74, temperature 98 3 °F (36 8 °C), temperature source Oral, resp  rate 18, height 5' 2" (1 575 m), weight 62 7 kg (138 lb 3 2 oz), SpO2 96 %  , Body mass index is 25 28 kg/m² , Orthostatic Blood Pressures      Most Recent Value   Blood Pressure  154/64 filed at 12/12/2018 0814   Patient Position - Orthostatic VS  Lying filed at 12/12/2018 0814            Intake/Output Summary (Last 24 hours) at 12/12/18 0849  Last data filed at 12/12/18 0400   Gross per 24 hour   Intake            172 5 ml   Output              325 ml   Net           -152 5 ml       TELE: 11 seconds NSVT this morning      Physical Exam:    GEN: Donavan Cox appears well, alert and oriented x 3, pleasant and cooperative   HEENT: pupils equal, round, and reactive to light; extraocular muscles intact  NECK: supple, no carotid bruits   HEART: regular rhythm, normal S1 and S2, no murmurs, clicks, gallops or rubs   LUNGS: clear to auscultation bilaterally; no wheezes, rales, or rhonchi   ABDOMEN: normal bowel sounds, soft, no tenderness, no distention  EXTREMITIES: peripheral pulses normal; no clubbing, cyanosis, or edema  NEURO: no focal findings   SKIN: normal without suspicious lesions on exposed skin    Medications:      Current Facility-Administered Medications:     albuterol inhalation solution 2 5 mg, 2 5 mg, Nebulization, Q4H PRN, Ellyn Henderson MD    amLODIPine (NORVASC) tablet 5 mg, 5 mg, Oral, Daily, ANDREINA Han, 5 mg at 12/12/18 0818    budesonide-formoterol (SYMBICORT) 160-4 5 mcg/act inhaler 2 puff, 2 puff, Inhalation, BID, Rachael Puentes PA-C, 2 puff at 12/12/18 0818    clopidogrel (PLAVIX) tablet 75 mg, 75 mg, Oral, Daily, ANDREINA Han, 75 mg at 12/12/18 0818    insulin glargine (LANTUS) subcutaneous injection 6 Units 0 06 mL, 6 Units, Subcutaneous, QAM, Starla Suggs MD, 6 Units at 12/12/18 0835    insulin lispro (HumaLOG) 100 units/mL subcutaneous injection 1-5 Units, 1-5 Units, Subcutaneous, TID AC, 2 Units at 12/12/18 0827 **AND** Fingerstick Glucose (POCT), , , TID AC, Starla Suggs MD    insulin lispro (HumaLOG) 100 units/mL subcutaneous injection 1-5 Units, 1-5 Units, Subcutaneous, HS, Starla Suggs MD, 2 Units at 12/11/18 2254    levalbuterol [facility]) inhalation solution 1 25 mg, 1 25 mg, Nebulization, TID, Ellyn Henderson MD, 1 25 mg at 12/12/18 9922    magnesium sulfate 2 g/50 mL IVPB (premix) 2 g, 2 g, Intravenous, Once, Starla Suggs MD    metoprolol tartrate (LOPRESSOR) partial tablet 12 5 mg, 12 5 mg, Oral, Q12H [address], ANDREINA Han, 12 5 mg at 12/12/18 0817    nitroglycerin (NITROSTAT) SL tablet 0 4 mg, 0 4 mg, Sublingual, Q5 Min PRN, Diane Wharton PA-C, 0 4 mg at 12/10/18 2334    pantoprazole (PROTONIX) injection 40 mg, 40 mg, Intravenous, Q12H, Heaven Tran PA-C, 40 mg at 12/12/18 0818    potassium chloride (K-DUR,KLOR-CON) CR tablet 20 mEq, 20 mEq, Oral, Once, Solomon Zepeda MD    sodium chloride 0 9 % inhalation solution 3 mL, 3 mL, Nebulization, TID, Saúl Guevara MD, 3 mL at 12/12/18 0811     Labs & Results:      Results from last 7 days  Lab Units 12/10/18  2024 12/10/18  1912 12/10/18  1543   TROPONIN I ng/mL 0 35* 0 40* 0 27*       Results from last 7 days  Lab Units 12/12/18  0616 12/11/18  2019 12/11/18  1259  12/10/18  0452   WBC Thousand/uL 10 32*  --  15 91*  --  14 30*   HEMOGLOBIN g/dL 10 3* 9 9* 10 9*  < > 8 8*   HEMATOCRIT % 30 4*  --  31 9*  --  26 2*   PLATELETS Thousands/uL 213  --  246  --  212   < > = values in this interval not displayed      Results from last 7 days  Lab Units 12/11/18  0512   TRIGLYCERIDES mg/dL 214*   HDL mg/dL 47       Results from last 7 days  Lab Units 12/12/18  0616 12/10/18  0452 12/09/18  1037   POTASSIUM mmol/L 3 0* 3 7 4 2   CHLORIDE mmol/L 102 108 102   CO2 mmol/L 24 21 22   BUN mg/dL 41* 42* 68*   CREATININE mg/dL 2 07* 1 75* 2 08*   CALCIUM mg/dL 8 7 8 5 8 7   ALK PHOS U/L  --  49 55   ALT U/L  --  20 26   AST U/L  --  17 19       Results from last 7 days  Lab Units 12/09/18  1037   INR  1 05   PTT seconds 24*       Results from last 7 days  Lab Units 12/12/18  0616 12/11/18  1259   MAGNESIUM mg/dL 1 7 1 6       Echo: personally reviewed - EF 60%, G1DD, basal-mind inf HK    EKG personally reviewed by Lucetta Bosworth, MD

## 2018-12-12 NOTE — ASSESSMENT & PLAN NOTE
Lab Results   Component Value Date    HGBA1C 8 7 (H) 12/10/2018       Recent Labs      12/11/18   0510  12/11/18   0826  12/11/18   1106  12/11/18   1705   POCGLU  167*  161*  228*  222*       Blood Sugar Average: Last 72 hrs:  (P) 156 3    Oral medications held  Continue SSI

## 2018-12-12 NOTE — ASSESSMENT & PLAN NOTE
· Due to above  + volume overload  · S/p IV lasix 20 mg x 2  · S/p chest tube  · Ct O2 to maintain sats greater than or equal to 88%

## 2018-12-12 NOTE — ASSESSMENT & PLAN NOTE
· Right-sided apical pneumothorax  · Status post chest tube placement by IR  · Pulmonology input appreciated

## 2018-12-13 ENCOUNTER — APPOINTMENT (INPATIENT)
Dept: RADIOLOGY | Facility: HOSPITAL | Age: 78
DRG: 377 | End: 2018-12-13
Payer: COMMERCIAL

## 2018-12-13 ENCOUNTER — APPOINTMENT (INPATIENT)
Dept: ULTRASOUND IMAGING | Facility: HOSPITAL | Age: 78
DRG: 377 | End: 2018-12-13
Payer: COMMERCIAL

## 2018-12-13 PROBLEM — K86.89 DILATED PANCREATIC DUCT: Status: ACTIVE | Noted: 2018-12-13

## 2018-12-13 LAB
ANION GAP SERPL CALCULATED.3IONS-SCNC: 12 MMOL/L (ref 4–13)
BUN SERPL-MCNC: 34 MG/DL (ref 5–25)
CALCIUM SERPL-MCNC: 8.9 MG/DL (ref 8.3–10.1)
CHLORIDE SERPL-SCNC: 104 MMOL/L (ref 100–108)
CO2 SERPL-SCNC: 22 MMOL/L (ref 21–32)
CREAT SERPL-MCNC: 2.21 MG/DL (ref 0.6–1.3)
GFR SERPL CREATININE-BSD FRML MDRD: 28 ML/MIN/1.73SQ M
GLUCOSE SERPL-MCNC: 206 MG/DL (ref 65–140)
GLUCOSE SERPL-MCNC: 261 MG/DL (ref 65–140)
GLUCOSE SERPL-MCNC: 266 MG/DL (ref 65–140)
GLUCOSE SERPL-MCNC: 272 MG/DL (ref 65–140)
GLUCOSE SERPL-MCNC: 281 MG/DL (ref 65–140)
HCT VFR BLD AUTO: 32.3 % (ref 36.5–49.3)
HGB BLD-MCNC: 10.8 G/DL (ref 12–17)
MAGNESIUM SERPL-MCNC: 2 MG/DL (ref 1.6–2.6)
POTASSIUM SERPL-SCNC: 4 MMOL/L (ref 3.5–5.3)
SODIUM SERPL-SCNC: 138 MMOL/L (ref 136–145)

## 2018-12-13 PROCEDURE — 94762 N-INVAS EAR/PLS OXIMTRY CONT: CPT

## 2018-12-13 PROCEDURE — 99232 SBSQ HOSP IP/OBS MODERATE 35: CPT | Performed by: INTERNAL MEDICINE

## 2018-12-13 PROCEDURE — 94640 AIRWAY INHALATION TREATMENT: CPT

## 2018-12-13 PROCEDURE — 94660 CPAP INITIATION&MGMT: CPT

## 2018-12-13 PROCEDURE — 99232 SBSQ HOSP IP/OBS MODERATE 35: CPT | Performed by: NURSE PRACTITIONER

## 2018-12-13 PROCEDURE — 83735 ASSAY OF MAGNESIUM: CPT | Performed by: INTERNAL MEDICINE

## 2018-12-13 PROCEDURE — 80048 BASIC METABOLIC PNL TOTAL CA: CPT | Performed by: INTERNAL MEDICINE

## 2018-12-13 PROCEDURE — 82948 REAGENT STRIP/BLOOD GLUCOSE: CPT

## 2018-12-13 PROCEDURE — 85018 HEMOGLOBIN: CPT | Performed by: INTERNAL MEDICINE

## 2018-12-13 PROCEDURE — 71045 X-RAY EXAM CHEST 1 VIEW: CPT

## 2018-12-13 PROCEDURE — 94760 N-INVAS EAR/PLS OXIMETRY 1: CPT

## 2018-12-13 PROCEDURE — 85014 HEMATOCRIT: CPT | Performed by: INTERNAL MEDICINE

## 2018-12-13 PROCEDURE — 76770 US EXAM ABDO BACK WALL COMP: CPT

## 2018-12-13 RX ORDER — LIDOCAINE HYDROCHLORIDE 40 MG/ML
5 INJECTION, SOLUTION RETROBULBAR; TOPICAL ONCE
Status: CANCELLED | OUTPATIENT
Start: 2018-12-13 | End: 2018-12-13

## 2018-12-13 RX ORDER — INSULIN GLARGINE 100 [IU]/ML
5 INJECTION, SOLUTION SUBCUTANEOUS ONCE
Status: COMPLETED | OUTPATIENT
Start: 2018-12-13 | End: 2018-12-13

## 2018-12-13 RX ORDER — INSULIN GLARGINE 100 [IU]/ML
15 INJECTION, SOLUTION SUBCUTANEOUS EVERY MORNING
Status: DISCONTINUED | OUTPATIENT
Start: 2018-12-14 | End: 2018-12-15

## 2018-12-13 RX ADMIN — ISODIUM CHLORIDE 3 ML: 0.03 SOLUTION RESPIRATORY (INHALATION) at 11:53

## 2018-12-13 RX ADMIN — INSULIN LISPRO 2 UNITS: 100 INJECTION, SOLUTION INTRAVENOUS; SUBCUTANEOUS at 08:35

## 2018-12-13 RX ADMIN — METOPROLOL TARTRATE 25 MG: 25 TABLET, FILM COATED ORAL at 21:44

## 2018-12-13 RX ADMIN — LEVALBUTEROL HYDROCHLORIDE 1.25 MG: 1.25 SOLUTION, CONCENTRATE RESPIRATORY (INHALATION) at 19:21

## 2018-12-13 RX ADMIN — INSULIN GLARGINE 5 UNITS: 100 INJECTION, SOLUTION SUBCUTANEOUS at 09:11

## 2018-12-13 RX ADMIN — INSULIN LISPRO 2 UNITS: 100 INJECTION, SOLUTION INTRAVENOUS; SUBCUTANEOUS at 12:40

## 2018-12-13 RX ADMIN — INSULIN LISPRO 3 UNITS: 100 INJECTION, SOLUTION INTRAVENOUS; SUBCUTANEOUS at 08:35

## 2018-12-13 RX ADMIN — ISODIUM CHLORIDE 3 ML: 0.03 SOLUTION RESPIRATORY (INHALATION) at 06:19

## 2018-12-13 RX ADMIN — INSULIN GLARGINE 10 UNITS: 100 INJECTION, SOLUTION SUBCUTANEOUS at 08:34

## 2018-12-13 RX ADMIN — LEVALBUTEROL HYDROCHLORIDE 1.25 MG: 1.25 SOLUTION, CONCENTRATE RESPIRATORY (INHALATION) at 11:53

## 2018-12-13 RX ADMIN — BUDESONIDE AND FORMOTEROL FUMARATE DIHYDRATE 2 PUFF: 160; 4.5 AEROSOL RESPIRATORY (INHALATION) at 08:34

## 2018-12-13 RX ADMIN — METOPROLOL TARTRATE 25 MG: 25 TABLET, FILM COATED ORAL at 08:34

## 2018-12-13 RX ADMIN — ISODIUM CHLORIDE 3 ML: 0.03 SOLUTION RESPIRATORY (INHALATION) at 19:21

## 2018-12-13 RX ADMIN — BUDESONIDE AND FORMOTEROL FUMARATE DIHYDRATE 2 PUFF: 160; 4.5 AEROSOL RESPIRATORY (INHALATION) at 17:58

## 2018-12-13 RX ADMIN — LEVALBUTEROL HYDROCHLORIDE 1.25 MG: 1.25 SOLUTION, CONCENTRATE RESPIRATORY (INHALATION) at 06:18

## 2018-12-13 RX ADMIN — INSULIN LISPRO 2 UNITS: 100 INJECTION, SOLUTION INTRAVENOUS; SUBCUTANEOUS at 21:44

## 2018-12-13 RX ADMIN — AMLODIPINE BESYLATE 5 MG: 5 TABLET ORAL at 08:34

## 2018-12-13 RX ADMIN — PANTOPRAZOLE SODIUM 40 MG: 40 TABLET, DELAYED RELEASE ORAL at 17:58

## 2018-12-13 RX ADMIN — INSULIN LISPRO 1 UNITS: 100 INJECTION, SOLUTION INTRAVENOUS; SUBCUTANEOUS at 17:57

## 2018-12-13 RX ADMIN — PANTOPRAZOLE SODIUM 40 MG: 40 TABLET, DELAYED RELEASE ORAL at 06:10

## 2018-12-13 RX ADMIN — CLOPIDOGREL 75 MG: 75 TABLET, FILM COATED ORAL at 08:34

## 2018-12-13 NOTE — ASSESSMENT & PLAN NOTE
Lab Results   Component Value Date    HGBA1C 8 7 (H) 12/10/2018       Recent Labs      12/12/18   0818  12/12/18   1102  12/12/18   1551  12/12/18   2112   POCGLU  264*  350*  340*  323*       Blood Sugar Average: Last 72 hrs:  (P) 206    Oral medications held  Blood sugars increasing with increased oral intake  Lantus 10 units daily and Humalog 3 units t i d added    Continue SSI  Monitor blood sugars and increase dose of insulin as needed

## 2018-12-13 NOTE — PROGRESS NOTES
Vascular access called about putting another midline in  Venous access said that, they wouldn't put another midline in because they can't guarantee blood return

## 2018-12-13 NOTE — ASSESSMENT & PLAN NOTE
· Felt to be likely due to anemia in the setting of coronary artery disease per Cardiology  · Elevated troponin to 0 4 felt to be likely a type 2 non STEMI due to acute anemia  · Transfuse to keep hemoglobin greater than 9  · Plavix restarted on 12/11  · Cardiology following - input appreciated

## 2018-12-13 NOTE — ASSESSMENT & PLAN NOTE
· CKD-3 with baseline creatinine 1 7 to 2  · Creatinine improved to 1 75 but increased to 2  today post Lasix  · Monitor

## 2018-12-13 NOTE — PROGRESS NOTES
Progress Note - Kenya Stratton 1940, 66 y o  male MRN: [id number]    Unit/Bed#: -01 Encounter: [id number]    Primary Care Provider: Julita Pulido MD   Date and time admitted to hospital: 12/9/2018 10:10 AM        GI bleed   Assessment & Plan    · Acute upper GI bleed due to duodenal ulcer with visible vessel treated endoscopically  · No recurrence of bleed at present  · Hemoglobin stable  · Continue IV Protonix 40 mg twice daily  · Monitor hemoglobin  · Cleared by Gastroenterology to restart Plavix and for cardiac evaluation     Pneumothorax   Assessment & Plan    · Right-sided apical pneumothorax  · Status post chest tube placement by IR on 12/11/18  · Chest tube on water seal - no air leak  · Chest x-ray done today shows improvement in pneumothorax  · Discussed with pulmonology - chest tube likely will be discontinued in 24 to 48 hours     Chest pain   Assessment & Plan    · Felt to be likely due to anemia in the setting of coronary artery disease per Cardiology  · Elevated troponin to 0 4 felt to be likely a type 2 non STEMI due to acute anemia  · Transfuse to keep hemoglobin greater than 9  · Plavix restarted today  · Cardiology following - input appreciated     Atelectasis of right lung   Assessment & Plan    · Collapse of right middle lobe - persistent  · In the setting of active smoking  · Bronchoscopy planned as inpatient per pulmonology to rule out lung mass versus foreign body versus mucus plug     Acute respiratory failure with hypoxia ([facility] [address] )   Assessment & Plan    · Due to above  + volume overload  · S/p IV lasix   · S/p chest tube  · Ct O2 to maintain sats greater than or equal to 88%     NSVT (nonsustained ventricular tachycardia) ([facility])   Assessment & Plan    · Keep potassium greater than 4 and magnesium greater than 2  · Continue telemetry  · Continue metoprolol     Acute kidney injury ([address] )   Assessment & Plan    · CKD-3 with baseline creatinine 1 7 to 2  · Creatinine improved to 1 75 but increased to 2  today post Lasix  · Monitor     CAD (coronary artery disease)   Assessment & Plan    · Plavix 75 mg daily restarted today  · Continue low-dose beta-blocker     DM2 (diabetes mellitus, type 2) [facility])   Assessment & Plan    Lab Results   Component Value Date    HGBA1C 8 7 (H) 12/10/2018       Recent Labs      18   0818  18   1102  18   1551  18   2112   POCGLU  264*  350*  340*  323*       Blood Sugar Average: Last 72 hrs:  (P) 206    Oral medications held  Blood sugars increasing with increased oral intake  Lantus 10 units daily and Humalog 3 units t i d added  Continue SSI  Monitor blood sugars and increase dose of insulin as needed         VTE Pharmacologic Prophylaxis:   Pharmacologic: Pharmacologic VTE Prophylaxis contraindicated due to GI bleed  Mechanical VTE Prophylaxis in Place: Yes    Patient Centered Rounds: I have performed bedside rounds with nursing staff today  Discussions with Specialists or Other Care Team Provider:  Discussed with pulmonology    Education and Discussions with Family / Patient:  Discussed with son on the phone    Time Spent for Care: 20 minutes  More than 50% of total time spent on counseling and coordination of care as described above  Current Length of Stay: 3 day(s)    Current Patient Status: Inpatient   Certification Statement: The patient will continue to require additional inpatient hospital stay due to Pneumothorax and persistent right middle lobe collapse requiring inpatient bronchoscopy    Code Status: Level 1 - Full Code    Subjective:   Minimal discomfort at chest tube site  No shortness of breath  No abdominal pain, nausea vomiting       Objective:     Vitals:   Temp (24hrs), Av 4 °F (36 9 °C), Min:98 1 °F (36 7 °C), Max:99 °F (37 2 °C)    Temp:  [98 1 °F (36 7 °C)-99 °F (37 2 °C)] 98 5 °F (36 9 °C)  HR:  [73-85] 73  Resp:  [18-22] 20  BP: (140-170)/(48-83) 140/48  SpO2:  [91 %-96 %] 93 %  Body mass index is 25 28 kg/m²  Physical Exam:     Physical Exam   Constitutional: He is oriented to person, place, and time  HENT:   Head: Atraumatic  Eyes: EOM are normal    Neck: Neck supple  No JVD present  No tracheal deviation present  No thyromegaly present  Cardiovascular: Normal rate, regular rhythm and normal heart sounds  Pulmonary/Chest: Effort normal    Decreased breath sounds bilaterally   Abdominal: Soft  Bowel sounds are normal  He exhibits no distension  There is no tenderness  There is no rebound  Musculoskeletal: He exhibits no edema  Neurological: He is alert and oriented to person, place, and time  Skin: Skin is warm and dry  Psychiatric: He has a normal mood and affect         Additional Data:     Labs:      Results from last 7 days  Lab Units 12/12/18  0616   WBC Thousand/uL 10 32*   HEMOGLOBIN g/dL 10 3*   HEMATOCRIT % 30 4*   PLATELETS Thousands/uL 213   NEUTROS PCT % 71   LYMPHS PCT % 16   MONOS PCT % 10   EOS PCT % 2       Results from last 7 days  Lab Units 12/12/18  0616 12/10/18  0452   SODIUM mmol/L 138 140   POTASSIUM mmol/L 3 0* 3 7   CHLORIDE mmol/L 102 108   CO2 mmol/L 24 21   BUN mg/dL 41* 42*   CREATININE mg/dL 2 07* 1 75*   ANION GAP mmol/L 12 11   CALCIUM mg/dL 8 7 8 5   ALBUMIN g/dL  --  2 6*   TOTAL BILIRUBIN mg/dL  --  0 30   ALK PHOS U/L  --  49   ALT U/L  --  20   AST U/L  --  17   GLUCOSE RANDOM mg/dL 234* 132       Results from last 7 days  Lab Units 12/09/18  1037   INR  1 05       Results from last 7 days  Lab Units 12/12/18  2112 12/12/18  1551 12/12/18  1102 12/12/18  0818 12/11/18  2126 12/11/18  1705 12/11/18  1106 12/11/18  0826 12/11/18  0510 12/11/18  0015 12/10/18  1206 12/10/18  0553   POC GLUCOSE mg/dl 323* 340* 350* 264* 250* 222* 228* 161* 167* 198* 105 150*       Results from last 7 days  Lab Units 12/10/18  1247   HEMOGLOBIN A1C % 8 7*       Results from last 7 days  Lab Units 12/11/18  1259 12/10/18  1247 12/09/18  1351 12/09/18  1140   LACTIC ACID mmol/L  -- --  0 9 3 1*   PROCALCITONIN ng/ml 0 12 <0 05 0 06  --            * I Have Reviewed All Lab Data Listed Above  * Additional Pertinent Lab Tests Reviewed: Roberto 66 Admission Reviewed    Recent Cultures (last 7 days):       Results from last 7 days  Lab Units 12/09/18  1140   BLOOD CULTURE  No Growth at 72 hrs  No Growth at 72 hrs  Last 24 Hours Medication List:     Current Facility-Administered Medications:  albuterol 2 5 mg Nebulization Q4H PRN Nicky Cr MD   amLODIPine 5 mg Oral Daily ANDREINA Theodore   budesonide-formoterol 2 puff Inhalation BID Zachary Lawson PA-C   clopidogrel 75 mg Oral Daily Юлия Fox, [address]   [START ON 12/13/2018] insulin glargine 10 Units Subcutaneous QAM Mita Freeman MD   insulin lispro 1-5 Units Subcutaneous TID [facility] [place] Mita Freeman MD   insulin lispro 1-5 Units Subcutaneous HS Mita Freeman MD   insulin lispro 3 Units Subcutaneous TID With Meals Mita Freeman MD   levalbuterol 1 25 mg Nebulization TID Nicky Cr MD   metoprolol tartrate 25 mg Oral Q12H [address] Maged Alarcon MD   nitroglycerin 0 4 mg Sublingual Q5 Min PRN Diane Wharton PA-C   pantoprazole 40 mg Oral BID AC Martha Deng MD   sodium chloride 3 mL Nebulization TID [place], MD        Today, Patient Was Seen By: Mita Freeman MD    ** Please Note: Dictation voice to text software may have been used in the creation of this document   **

## 2018-12-13 NOTE — ASSESSMENT & PLAN NOTE
· Acute upper GI bleed due to duodenal ulcer with visible vessel treated endoscopically  · No recurrence of bleed at present  · Hemoglobin stable  · Continue Protonix 40 mg twice daily  · Monitor hemoglobin  · Plavix restarted on 12/11/18

## 2018-12-13 NOTE — PROGRESS NOTES
Cardiology Progress Note - Tatiana Simth 66 y o  male MRN: [id number]    Unit/Bed#: -01 Encounter: [id number]      Assessment/Recommendations:  1  NSVT: K was low - now improved and without episodes on monitor - replete to keep >4  Continue B-blocker  2   Chest pain: no further episodes overnight  Perhaps related to anemia in setting of underlying CAD  Hgb now stable and asymptomatic  3   Type 2 NSTEMI: due to acute anemia and underlying CAD  4   CAD: with prior inferior MI, also confirmed on echo  For now, will continue on plavix and B-blocker  Will need eventual ASA to be started and outpatient ischemic evaluation including likely cardiac cath as an outpatient considering acute issues with GI bleed and PTX - not emergent/urgent, this can wait a few months in needed  5   HTN: improved this AM, continue B-blocker  6   HLD: continued on statin  7   Pneumothorax with atelectasis and collapse of RML: for bronch per pulm, continues with chest tube  8   GI Bleed: duodenal ulcer and visible vessel seen - s/p endoscopic treatment of vessel, but ulcer remains and will need more definitive fix relatively soon  Subjective:   Patient seen and examined  No significant events overnight   ; pertinent negatives - chest pain, chest pressure/discomfort, dyspnea, exertional chest pressure/discomfort, irregular heart beat and palpitations  Objective:     Vitals: Blood pressure 137/60, pulse 78, temperature 98 6 °F (37 °C), temperature source Oral, resp  rate 20, height 5' 2" (1 575 m), weight 65 4 kg (144 lb 2 9 oz), SpO2 99 %  , Body mass index is 26 37 kg/m² ,   Orthostatic Blood Pressures      Most Recent Value   Blood Pressure  137/60 filed at 12/13/2018 3913   Patient Position - Orthostatic VS  Lying filed at 12/13/2018 8041            Intake/Output Summary (Last 24 hours) at 12/13/18 0850  Last data filed at 12/13/18 0158   Gross per 24 hour   Intake              180 ml   Output              723 ml Net             -543 ml       TELE: No further NSVT overnight      Physical Exam:    GEN: Radha Duggan appears well, alert and oriented x 3, pleasant and cooperative   HEENT: pupils equal, round, and reactive to light; extraocular muscles intact  NECK: supple, no carotid bruits   HEART: regular rhythm, normal S1 and S2, no murmurs, clicks, gallops or rubs   LUNGS: diminished breath sounds, chest tube in place   ABDOMEN: normal bowel sounds, soft, no tenderness, no distention  EXTREMITIES: peripheral pulses normal; no clubbing, cyanosis, or edema  NEURO: no focal findings   SKIN: normal without suspicious lesions on exposed skin    Medications:      Current Facility-Administered Medications:     albuterol inhalation solution 2 5 mg, 2 5 mg, Nebulization, Q4H PRN, Charlotte Lan MD    amLODIPine (NORVASC) tablet 5 mg, 5 mg, Oral, Daily, Amberly Trae CRNP, 5 mg at 12/13/18 7370    budesonide-formoterol (SYMBICORT) 160-4 5 mcg/act inhaler 2 puff, 2 puff, Inhalation, BID, Ramin Collazo PA-C, 2 puff at 12/13/18 0834    clopidogrel (PLAVIX) tablet 75 mg, 75 mg, Oral, Daily, Amberly Trae, CRNP, 75 mg at 12/13/18 0834    [START ON 12/14/2018] insulin glargine (LANTUS) subcutaneous injection 15 Units 0 15 mL, 15 Units, Subcutaneous, QAM, Jose Thompson MD    insulin lispro (HumaLOG) 100 units/mL subcutaneous injection 1-5 Units, 1-5 Units, Subcutaneous, TID AC, 2 Units at 12/13/18 0835 **AND** Fingerstick Glucose (POCT), , , TID AC, Jose Thompson MD    insulin lispro (HumaLOG) 100 units/mL subcutaneous injection 1-5 Units, 1-5 Units, Subcutaneous, HS, Jose Thompson MD, 3 Units at 12/12/18 2152    insulin lispro (HumaLOG) 100 units/mL subcutaneous injection 3 Units, 3 Units, Subcutaneous, TID With Meals, Jose Thompson MD, 3 Units at 12/13/18 0835    levalbuterol [facility]) inhalation solution 1 25 mg, 1 25 mg, Nebulization, TID, Charlotte Lan MD, 1 25 mg at 12/13/18 0618    metoprolol tartrate (LOPRESSOR) tablet 25 mg, 25 mg, Oral, Q12H [facility], Abhishek Bates MD, 25 mg at 12/13/18 0834    nitroglycerin (NITROSTAT) SL tablet 0 4 mg, 0 4 mg, Sublingual, Q5 Min PRN, Diane Wharton PA-C, 0 4 mg at 12/10/18 2334    pantoprazole (PROTONIX) EC tablet 40 mg, 40 mg, Oral, BID AC, Joshua Pickard MD, 40 mg at 12/13/18 0610    sodium chloride 0 9 % inhalation solution 3 mL, 3 mL, Nebulization, TID, Maureen Allen MD, 3 mL at 12/13/18 7321     Labs & Results:      Results from last 7 days  Lab Units 12/10/18  2024 12/10/18  1912 12/10/18  1543   TROPONIN I ng/mL 0 35* 0 40* 0 27*       Results from last 7 days  Lab Units 12/13/18  0633 12/12/18  0616 12/11/18  2019 12/11/18  1259  12/10/18  0452   WBC Thousand/uL  --  10 32*  --  15 91*  --  14 30*   HEMOGLOBIN g/dL 10 8* 10 3* 9 9* 10 9*  < > 8 8*   HEMATOCRIT % 32 3* 30 4*  --  31 9*  --  26 2*   PLATELETS Thousands/uL  --  213  --  246  --  212   < > = values in this interval not displayed      Results from last 7 days  Lab Units 12/11/18  0512   TRIGLYCERIDES mg/dL 214*   HDL mg/dL 47       Results from last 7 days  Lab Units 12/13/18  0633 12/12/18  0616 12/10/18  0452 12/09/18  1037   POTASSIUM mmol/L 4 0 3 0* 3 7 4 2   CHLORIDE mmol/L 104 102 108 102   CO2 mmol/L 22 24 21 22   BUN mg/dL 34* 41* 42* 68*   CREATININE mg/dL 2 21* 2 07* 1 75* 2 08*   CALCIUM mg/dL 8 9 8 7 8 5 8 7   ALK PHOS U/L  --   --  49 55   ALT U/L  --   --  20 26   AST U/L  --   --  17 19       Results from last 7 days  Lab Units 12/09/18  1037   INR  1 05   PTT seconds 24*       Results from last 7 days  Lab Units 12/13/18  0633 12/12/18  0616 12/11/18  1259   MAGNESIUM mg/dL 2 0 1 7 1 6       Echo: personally reviewed - EF 60%, G1DD, basal-mind inf HK    EKG personally reviewed by Abhishek Bates MD

## 2018-12-13 NOTE — ASSESSMENT & PLAN NOTE
· Collapse of right middle lobe - persistent  · In the setting of active smoking  · Bronchoscopy planned as inpatient per pulmonology to rule out lung mass versus foreign body versus mucus plug

## 2018-12-13 NOTE — PROGRESS NOTES
Progress Note - Pulmonary   Epimenio Carlo 66 y o  male MRN: [id number]  Unit/Bed#: -01 Encounter: [id number]    Assessment/Plan:    Acute hypoxic respiratory failure, multifactorial due to below - improved              Titrate FiO2 to maintain saturations greater than or equal to 88%              Out of bed as tolerated               Continue IS      Abnormal chest x-ray/CT Chest with Right Hydropneumothorax S/P CT 12/12/18 and scattered parenchymal opacities (atelectasis versus pneumonitis) with 4 mm RENETTA nodule   Continue chest tube to water seal today  NPO after midnight for bronchoscopy tomorrow for evaluation of RML atelectasis  Not on AC due to GI bleed  Will need repeat CT Chest ~ 8-12 weeks      Emphysema/COPD of unknown severity without acute exacerbation              Continue Symbicort and Xopenex TID              GC needs for steroids at this time               KR would benefit from outpatient pulmonary follow-up and PFTs     Acute blood loss anemia due to GI bleed in the setting of duodenal ulcer              Management per primary team and GI               Hemoglobin stable      Active tobacco abuse              Smoking cessation and nicotine replacement therapy      Outpatient follow-up and repeat imaging pending hospital course   Discussed with Internal Medicine  Son updated at bedside  Chief Complaint:    I feel okay I guess      Subjective:    Harjit reports that he feels okay  He reports he has had no change since yesterday  He was placed on high-flow nasal cannula overnight; however, he does not feel short of breath  He has no change in his cough  He denies chest pain  His chest tube remains to water seal     Objective:    Vitals: Blood pressure (!) 177/79, pulse 74, temperature 98 5 °F (36 9 °C), temperature source Oral, resp  rate 20, height 5' 2" (1 575 m), weight 65 4 kg (144 lb 2 9 oz), SpO2 93 %    40% 35 liters high-flow nasal cannula,Body mass index is 26 37 kg/m²  Intake/Output Summary (Last 24 hours) at 12/13/18 1414  Last data filed at 12/13/18 4786   Gross per 24 hour   Intake                0 ml   Output              723 ml   Net             -723 ml       Invasive Devices     Peripheral Intravenous Line            Long-Dwell Peripheral IV (Midline) 00/78/03 Right Basilic 3 days          Drain            Chest Tube 1 Anterior Midaxillary 8 Fr  1 day                Physical Exam:     Physical Exam   Constitutional: He is oriented to person, place, and time  He appears well-developed and well-nourished  He is cooperative  Non-toxic appearance  No distress  HENT:   Head: Normocephalic and atraumatic  Mouth/Throat: No oropharyngeal exudate  Eyes: EOM are normal  No scleral icterus  Neck: Neck supple  No JVD present  No tracheal deviation present  Cardiovascular: Normal rate, regular rhythm, S1 normal and S2 normal   Exam reveals no gallop and no friction rub  No murmur heard  Pulmonary/Chest: Effort normal and breath sounds normal  No accessory muscle usage or stridor  No tachypnea  No respiratory distress  He has no decreased breath sounds  He has no wheezes  He has no rhonchi  He has no rales  He exhibits no tenderness  Right chest tube in place with dressing intact  No crepitus or air leak  Abdominal: Soft  Bowel sounds are normal  He exhibits no distension  There is no tenderness  There is no rebound and no guarding  Musculoskeletal: He exhibits no edema or tenderness  Neurological: He is alert and oriented to person, place, and time  He has normal strength  GCS eye subscore is 4  GCS verbal subscore is 5  GCS motor subscore is 6  Skin: Skin is warm and dry  No abrasion, no ecchymosis, no lesion and no rash noted  He is not diaphoretic  No cyanosis or erythema  Nails show no clubbing  Psychiatric: He has a normal mood and affect  His speech is normal and behavior is normal    Vitals reviewed      Labs:   CBC:   Lab Results   Component Value Date    HGB 10 8 (L) 12/13/2018    HCT 32 3 (L) 12/13/2018   , CMP:   Lab Results   Component Value Date    SODIUM 138 12/13/2018    K 4 0 12/13/2018     12/13/2018    CO2 22 12/13/2018    BUN 34 (H) 12/13/2018    CREATININE 2 21 (H) 12/13/2018    CALCIUM 8 9 12/13/2018    EGFR 28 12/13/2018       Imaging and other studies: I have personally reviewed pertinent reports   , I have personally reviewed pertinent films in PACS and Chest x-ray today shows continued resolution of right apical pneumothorax, but does show small right pleural effusion

## 2018-12-13 NOTE — ASSESSMENT & PLAN NOTE
Lab Results   Component Value Date    HGBA1C 8 7 (H) 12/10/2018       Recent Labs      12/12/18   2112  12/13/18   0813  12/13/18   1136  12/13/18   1713   POCGLU  323*  261*  281*  206*       Blood Sugar Average: Last 72 hrs:  (P) 444 [id number]    Oral medications held  Blood sugars increasing with increased oral intake  Lantus increased to 15 units daily, humalog 4 TID & SSI  Hold Lantus in am for bronchoscopy

## 2018-12-13 NOTE — ASSESSMENT & PLAN NOTE
· Right-sided hydropneumothorax   · Status post chest tube placement by IR on 12/11/18  · Chest tube on water seal   · Chest x-ray done today shows no pneumothorax  · Pulmonology following - input appreciated

## 2018-12-13 NOTE — ASSESSMENT & PLAN NOTE
· Due to above  + volume overload  · S/p IV lasix   · S/p chest tube  · Ct O2 to maintain sats greater than or equal to 88%

## 2018-12-13 NOTE — PROGRESS NOTES
Progress Note - Mamie Ace 1940, 66 y o  male MRN: [id number]    Unit/Bed#: -01 Encounter: [id number]    Primary Care Provider: Tomasa Denis MD   Date and time admitted to hospital: 12/9/2018 10:10 AM        GI bleed   Assessment & Plan    · Acute upper GI bleed due to duodenal ulcer with visible vessel treated endoscopically  · No recurrence of bleed at present  · Hemoglobin stable  · Continue Protonix 40 mg twice daily  · Monitor hemoglobin  · Plavix restarted on 12/11/18     Pneumothorax   Assessment & Plan    · Right-sided hydropneumothorax   · Status post chest tube placement by IR on 12/11/18  · Chest tube on water seal   · Chest x-ray done today shows no pneumothorax  · Pulmonology following - input appreciated        Atelectasis of right lung   Assessment & Plan    · Collapse of right middle lobe - persistent  · In the setting of active smoking  · Bronchoscopy on 12/14/18 - rule out lung mass versus foreign body versus mucus plug     Chest pain   Assessment & Plan    · Felt to be likely due to anemia in the setting of coronary artery disease per Cardiology  · Elevated troponin to 0 4 felt to be likely a type 2 non STEMI due to acute anemia  · Transfuse to keep hemoglobin greater than 9  · Plavix restarted on 12/11  · Cardiology following - input appreciated     Acute respiratory failure with hypoxia [facility])   Assessment & Plan    · Due to above  + volume overload  · S/p IV lasix   · S/p chest tube  · Ct O2 to maintain sats greater than or equal to 88%  · Placed on high flow this am  · Bronchoscopy on 12/14     Acute kidney injury [facility])   Assessment & Plan    · CKD-3 with baseline creatinine 1 7 to 2  · Creatinine improved to 1 75 but increased to 2 2  · No Lasix since 12/11  · Renal US - no hydronephrosis  · Monitor     Dilated pancreatic duct   Assessment & Plan    · Dilated main pancreatic duct on CT  · Non emergent MRCP or ERCP     NSVT (nonsustained ventricular tachycardia) ([address] ) Assessment & Plan    · No recurrence after repletion of K/Mag  · Keep potassium greater than 4 and magnesium greater than 2  · Continue telemetry  · Continue metoprolol     Leukocytosis   Assessment & Plan    · Improved  · No evidence of infection  · Likely reactive       CAD (coronary artery disease)   Assessment & Plan    · Ct Plavix, BB     Hypertension   Assessment & Plan    · Intermittent increase when agitated  · Ct Amlodipine 5 mg daily, Metoprolol tartrate 25 mg BID     COPD (chronic obstructive pulmonary disease) ([facility])   Assessment & Plan    · No exacerbation  · Ct Symbicort (160/4 5) 2 puffs BID, Xopenex neb 1 25 mg TID  ·        DM2 (diabetes mellitus, type 2) ([facility] )   Assessment & Plan    Lab Results   Component Value Date    HGBA1C 8 7 (H) 12/10/2018       Recent Labs      12/12/18   2112  12/13/18   0813  12/13/18   1136  12/13/18   1713   POCGLU  323*  261*  281*  206*       Blood Sugar Average: Last 72 hrs:  (P) 193 [id number]    Oral medications held  Blood sugars increasing with increased oral intake  Lantus increased to 15 units daily, humalog 4 TID & SSI  Hold Lantus in am for bronchoscopy     Stage 3 chronic kidney disease ([facility] )   Assessment & Plan    · As noted above under NIDHI         VTE Pharmacologic Prophylaxis:   Pharmacologic: Pharmacologic VTE Prophylaxis contraindicated due to GI bleed  Mechanical VTE Prophylaxis in Place: Yes    Patient Centered Rounds: I have performed bedside rounds with nursing staff today  Discussions with Specialists or Other Care Team Provider:  Discussed with pulmonology    Education and Discussions with Family / Patient:  Discussed with son on the phone    Time Spent for Care: 20 minutes  More than 50% of total time spent on counseling and coordination of care as described above      Current Length of Stay: 4 day(s)    Current Patient Status: Inpatient   Certification Statement: The patient will continue to require additional inpatient hospital stay due to pneumothorax, right middle lobe collapse requiring bronchoscopic evaluation     Code Status: Level 1 - Full Code    Subjective:   Placed back on high flow nasal canula this morning  Irritated as he is not able to walk to the bathroom while on high flow  Transient shortness of breath this morning  No chest pain  Objective:     Vitals:   Temp (24hrs), Av 6 °F (37 °C), Min:98 4 °F (36 9 °C), Max:98 8 °F (37 1 °C)    Temp:  [98 4 °F (36 9 °C)-98 8 °F (37 1 °C)] 98 7 °F (37 1 °C)  HR:  [63-81] 81  Resp:  [20] 20  BP: (137-177)/(48-83) 174/72  SpO2:  [89 %-99 %] 92 %  Body mass index is 26 37 kg/m²  Physical Exam:     Physical Exam   Constitutional: He is oriented to person, place, and time  HENT:   Head: Atraumatic  Eyes: EOM are normal    Neck: Neck supple  No JVD present  No tracheal deviation present  No thyromegaly present  Cardiovascular: Normal rate, regular rhythm and normal heart sounds  Pulmonary/Chest: Effort normal and breath sounds normal  No respiratory distress  He has no wheezes  He has no rales  Abdominal: Soft  Bowel sounds are normal  He exhibits no distension  There is no tenderness  There is no rebound  Musculoskeletal: He exhibits no edema  Neurological: He is alert and oriented to person, place, and time  Skin: Skin is warm and dry         Additional Data:     Labs:      Results from last 7 days  Lab Units 18  0633 18  0616   WBC Thousand/uL  --  10 32*   HEMOGLOBIN g/dL 10 8* 10 3*   HEMATOCRIT % 32 3* 30 4*   PLATELETS Thousands/uL  --  213   NEUTROS PCT %  --  71   LYMPHS PCT %  --  16   MONOS PCT %  --  10   EOS PCT %  --  2       Results from last 7 days  Lab Units 18  0633  12/10/18  0452   SODIUM mmol/L 138  < > 140   POTASSIUM mmol/L 4 0  < > 3 7   CHLORIDE mmol/L 104  < > 108   CO2 mmol/L 22  < > 21   BUN mg/dL 34*  < > 42*   CREATININE mg/dL 2 21*  < > 1 75*   ANION GAP mmol/L 12  < > 11   CALCIUM mg/dL 8 9  < > 8 5   ALBUMIN g/dL  -- --  2 6*   TOTAL BILIRUBIN mg/dL  --   --  0 30   ALK PHOS U/L  --   --  49   ALT U/L  --   --  20   AST U/L  --   --  17   GLUCOSE RANDOM mg/dL 266*  < > 132   < > = values in this interval not displayed  Results from last 7 days  Lab Units 12/09/18  1037   INR  1 05       Results from last 7 days  Lab Units 12/13/18  1713 12/13/18  1136 12/13/18  0813 12/12/18  2112 12/12/18  1551 12/12/18  1102 12/12/18  0818 12/11/18  2126 12/11/18  1705 12/11/18  1106 12/11/18  0826 12/11/18  0510   POC GLUCOSE mg/dl 206* 281* 261* 323* 340* 350* 264* 250* 222* 228* 161* 167*       Results from last 7 days  Lab Units 12/10/18  1247   HEMOGLOBIN A1C % 8 7*       Results from last 7 days  Lab Units 12/11/18  1259 12/10/18  1247 12/09/18  1351 12/09/18  1140   LACTIC ACID mmol/L  --   --  0 9 3 1*   PROCALCITONIN ng/ml 0 12 <0 05 0 06  --      * I Have Reviewed All Lab Data Listed Above  * Additional Pertinent Lab Tests Reviewed: Roberto 66 Admission Reviewed    Recent Cultures (last 7 days):       Results from last 7 days  Lab Units 12/09/18  1140   BLOOD CULTURE  No Growth at 72 hrs  No Growth at 72 hrs         Last 24 Hours Medication List:     Current Facility-Administered Medications:  albuterol 2 5 mg Nebulization Q4H PRN Mamadou Mtz MD   amLODIPine 5 mg Oral Daily Lisa Levo, CRNP   budesonide-formoterol 2 puff Inhalation BID Matt Sharma PA-C   clopidogrel 75 mg Oral Daily Lisa Levo, [address]   [START ON 12/14/2018] insulin glargine 15 Units Subcutaneous QAM Babs Taylor MD   insulin lispro 1-5 Units Subcutaneous TID [facility] Babs Taylor MD   insulin lispro 1-5 Units Subcutaneous HS Babs Taylor MD   insulin lispro 4 Units Subcutaneous TID With Meals Babs Taylor MD   levalbuterol 1 25 mg Nebulization TID Mamadou Mtz MD   metoprolol tartrate 25 mg Oral Q12H [facility] Raul Wesley MD   nitroglycerin 0 4 mg Sublingual Q5 Min PRN Diane Wharton PA-C   pantoprazole 40 mg Oral BID AC Oksana Nick MD   sodium chloride 3 mL Nebulization TID Tate Pepper MD        Today, Patient Was Seen By: Barb Keller MD    ** Please Note: Dictation voice to text software may have been used in the creation of this document   **

## 2018-12-13 NOTE — ASSESSMENT & PLAN NOTE
· Collapse of right middle lobe - persistent  · In the setting of active smoking  · Bronchoscopy on 12/14/18 - rule out lung mass versus foreign body versus mucus plug

## 2018-12-13 NOTE — ASSESSMENT & PLAN NOTE
· No recurrence after repletion of K/Mag  · Keep potassium greater than 4 and magnesium greater than 2  · Continue telemetry  · Continue metoprolol

## 2018-12-13 NOTE — ASSESSMENT & PLAN NOTE
· Due to above  + volume overload  · S/p IV lasix   · S/p chest tube  · Ct O2 to maintain sats greater than or equal to 88%  · Placed on high flow this am  · Bronchoscopy on 12/14

## 2018-12-13 NOTE — ASSESSMENT & PLAN NOTE
· CKD-3 with baseline creatinine 1 7 to 2  · Creatinine improved to 1 75 but increased to 2 2  · No Lasix since 12/11  · Renal US - no hydronephrosis  · Monitor

## 2018-12-13 NOTE — ASSESSMENT & PLAN NOTE
· Right-sided apical pneumothorax  · Status post chest tube placement by IR on 12/11/18  · Chest tube on water seal - no air leak  · Chest x-ray done today shows improvement in pneumothorax  · Discussed with pulmonology - chest tube likely will be discontinued in 24 to 48 hours

## 2018-12-13 NOTE — PROGRESS NOTES
Patient had pa and lateral chest xray ordered from this AM by pulmonoolgy service  Spoke with xray tech, Junie Art, to confirm time to bring patient down for test due to stepdown status  Informed by Junie Art that portable xray was done this AM ordered by [facility] and that per the day time RN the pa and lateral chest xray was to be canceled by the ordering provider    Attempted to make contact with ordering provider for clarification but was unsuccessful  Will continue to monitor

## 2018-12-13 NOTE — ASSESSMENT & PLAN NOTE
· Keep potassium greater than 4 and magnesium greater than 2  · Continue telemetry  · Continue metoprolol

## 2018-12-14 ENCOUNTER — ANESTHESIA EVENT (INPATIENT)
Dept: GASTROENTEROLOGY | Facility: HOSPITAL | Age: 78
DRG: 377 | End: 2018-12-14
Payer: COMMERCIAL

## 2018-12-14 ENCOUNTER — ANESTHESIA (INPATIENT)
Dept: GASTROENTEROLOGY | Facility: HOSPITAL | Age: 78
DRG: 377 | End: 2018-12-14
Payer: COMMERCIAL

## 2018-12-14 PROBLEM — R74.8 ELEVATED LIPASE: Status: RESOLVED | Noted: 2018-12-09 | Resolved: 2018-12-14

## 2018-12-14 LAB
ANION GAP SERPL CALCULATED.3IONS-SCNC: 10 MMOL/L (ref 4–13)
BACTERIA BLD CULT: NORMAL
BACTERIA BLD CULT: NORMAL
BUN SERPL-MCNC: 36 MG/DL (ref 5–25)
CALCIUM SERPL-MCNC: 9.1 MG/DL (ref 8.3–10.1)
CHLORIDE SERPL-SCNC: 105 MMOL/L (ref 100–108)
CO2 SERPL-SCNC: 26 MMOL/L (ref 21–32)
CREAT SERPL-MCNC: 1.84 MG/DL (ref 0.6–1.3)
GFR SERPL CREATININE-BSD FRML MDRD: 34 ML/MIN/1.73SQ M
GLUCOSE SERPL-MCNC: 206 MG/DL (ref 65–140)
GLUCOSE SERPL-MCNC: 220 MG/DL (ref 65–140)
GLUCOSE SERPL-MCNC: 245 MG/DL (ref 65–140)
GLUCOSE SERPL-MCNC: 250 MG/DL (ref 65–140)
GLUCOSE SERPL-MCNC: 260 MG/DL (ref 65–140)
POTASSIUM SERPL-SCNC: 4.5 MMOL/L (ref 3.5–5.3)
SODIUM SERPL-SCNC: 141 MMOL/L (ref 136–145)

## 2018-12-14 PROCEDURE — 99232 SBSQ HOSP IP/OBS MODERATE 35: CPT | Performed by: INTERNAL MEDICINE

## 2018-12-14 PROCEDURE — 82948 REAGENT STRIP/BLOOD GLUCOSE: CPT

## 2018-12-14 PROCEDURE — 94762 N-INVAS EAR/PLS OXIMTRY CONT: CPT

## 2018-12-14 PROCEDURE — 94640 AIRWAY INHALATION TREATMENT: CPT

## 2018-12-14 PROCEDURE — 0B958ZZ DRAINAGE OF RIGHT MIDDLE LOBE BRONCHUS, VIA NATURAL OR ARTIFICIAL OPENING ENDOSCOPIC: ICD-10-PCS | Performed by: INTERNAL MEDICINE

## 2018-12-14 PROCEDURE — 31622 DX BRONCHOSCOPE/WASH: CPT | Performed by: INTERNAL MEDICINE

## 2018-12-14 PROCEDURE — 94760 N-INVAS EAR/PLS OXIMETRY 1: CPT

## 2018-12-14 PROCEDURE — 99233 SBSQ HOSP IP/OBS HIGH 50: CPT | Performed by: INTERNAL MEDICINE

## 2018-12-14 PROCEDURE — 80048 BASIC METABOLIC PNL TOTAL CA: CPT | Performed by: INTERNAL MEDICINE

## 2018-12-14 RX ORDER — PRAVASTATIN SODIUM 40 MG
40 TABLET ORAL
Status: DISCONTINUED | OUTPATIENT
Start: 2018-12-14 | End: 2018-12-16 | Stop reason: HOSPADM

## 2018-12-14 RX ORDER — LIDOCAINE HYDROCHLORIDE 20 MG/ML
JELLY TOPICAL AS NEEDED
Status: DISCONTINUED | OUTPATIENT
Start: 2018-12-14 | End: 2018-12-14 | Stop reason: HOSPADM

## 2018-12-14 RX ORDER — KETAMINE HCL IN NACL, ISO-OSM 100MG/10ML
SYRINGE (ML) INJECTION AS NEEDED
Status: DISCONTINUED | OUTPATIENT
Start: 2018-12-14 | End: 2018-12-14 | Stop reason: SURG

## 2018-12-14 RX ORDER — SODIUM CHLORIDE 9 MG/ML
INJECTION, SOLUTION INTRAVENOUS CONTINUOUS PRN
Status: DISCONTINUED | OUTPATIENT
Start: 2018-12-14 | End: 2018-12-14 | Stop reason: SURG

## 2018-12-14 RX ORDER — LIDOCAINE HYDROCHLORIDE 10 MG/ML
INJECTION, SOLUTION EPIDURAL; INFILTRATION; INTRACAUDAL; PERINEURAL
Status: COMPLETED
Start: 2018-12-14 | End: 2018-12-14

## 2018-12-14 RX ORDER — PROPOFOL 10 MG/ML
INJECTION, EMULSION INTRAVENOUS AS NEEDED
Status: DISCONTINUED | OUTPATIENT
Start: 2018-12-14 | End: 2018-12-14 | Stop reason: SURG

## 2018-12-14 RX ORDER — LIDOCAINE HYDROCHLORIDE 10 MG/ML
INJECTION, SOLUTION EPIDURAL; INFILTRATION; INTRACAUDAL; PERINEURAL AS NEEDED
Status: DISCONTINUED | OUTPATIENT
Start: 2018-12-14 | End: 2018-12-14 | Stop reason: HOSPADM

## 2018-12-14 RX ORDER — ASPIRIN 81 MG/1
81 TABLET ORAL DAILY
Status: DISCONTINUED | OUTPATIENT
Start: 2018-12-15 | End: 2018-12-16 | Stop reason: HOSPADM

## 2018-12-14 RX ADMIN — ISODIUM CHLORIDE 3 ML: 0.03 SOLUTION RESPIRATORY (INHALATION) at 07:39

## 2018-12-14 RX ADMIN — BUDESONIDE AND FORMOTEROL FUMARATE DIHYDRATE 2 PUFF: 160; 4.5 AEROSOL RESPIRATORY (INHALATION) at 17:04

## 2018-12-14 RX ADMIN — BUDESONIDE AND FORMOTEROL FUMARATE DIHYDRATE 2 PUFF: 160; 4.5 AEROSOL RESPIRATORY (INHALATION) at 09:58

## 2018-12-14 RX ADMIN — SODIUM CHLORIDE: 0.9 INJECTION, SOLUTION INTRAVENOUS at 13:01

## 2018-12-14 RX ADMIN — CLOPIDOGREL 75 MG: 75 TABLET, FILM COATED ORAL at 10:02

## 2018-12-14 RX ADMIN — METOPROLOL TARTRATE 25 MG: 25 TABLET, FILM COATED ORAL at 10:02

## 2018-12-14 RX ADMIN — PANTOPRAZOLE SODIUM 40 MG: 40 TABLET, DELAYED RELEASE ORAL at 17:03

## 2018-12-14 RX ADMIN — PRAVASTATIN SODIUM 40 MG: 40 TABLET ORAL at 17:03

## 2018-12-14 RX ADMIN — LEVALBUTEROL HYDROCHLORIDE 1.25 MG: 1.25 SOLUTION, CONCENTRATE RESPIRATORY (INHALATION) at 07:39

## 2018-12-14 RX ADMIN — AMLODIPINE BESYLATE 5 MG: 5 TABLET ORAL at 10:02

## 2018-12-14 RX ADMIN — ISODIUM CHLORIDE 3 ML: 0.03 SOLUTION RESPIRATORY (INHALATION) at 19:41

## 2018-12-14 RX ADMIN — INSULIN LISPRO 2 UNITS: 100 INJECTION, SOLUTION INTRAVENOUS; SUBCUTANEOUS at 17:04

## 2018-12-14 RX ADMIN — Medication 20 MG: at 13:17

## 2018-12-14 RX ADMIN — INSULIN GLARGINE 15 UNITS: 100 INJECTION, SOLUTION SUBCUTANEOUS at 10:03

## 2018-12-14 RX ADMIN — INSULIN LISPRO 1 UNITS: 100 INJECTION, SOLUTION INTRAVENOUS; SUBCUTANEOUS at 09:58

## 2018-12-14 RX ADMIN — PROPOFOL 60 MG: 10 INJECTION, EMULSION INTRAVENOUS at 13:17

## 2018-12-14 RX ADMIN — LEVALBUTEROL HYDROCHLORIDE 1.25 MG: 1.25 SOLUTION, CONCENTRATE RESPIRATORY (INHALATION) at 19:41

## 2018-12-14 RX ADMIN — PROPOFOL 20 MG: 10 INJECTION, EMULSION INTRAVENOUS at 13:19

## 2018-12-14 NOTE — OP NOTE
OPERATIVE REPORT  PATIENT NAME: Bessy Coronel    :  1940  MRN: [id number]  Pt Location: [facility] GI ROOM 01    SURGERY DATE: 2018    Surgeon(s) and Role:     * Chelo Juarez DO - Primary    Preop Diagnosis:  Other nonspecific abnormal finding of lung field [R91 8]    Post-Op Diagnosis Codes:     * Other nonspecific abnormal finding of lung field [R91 8]    Procedure(s) (LRB):  BRONCHOSCOPY FLEXIBLE (N/A)    Specimen(s):  * No specimens in log *    Estimated Blood Loss:   Minimal    Drains:  Chest Tube 1 Anterior Midaxillary 8 Fr  (Active)   Function To water seal 2018  4:00 PM   Chest Tube Air Leak No 2018  4:00 PM   Drainage Description Serosanguineous 2018  9:48 PM   Dressing Status Clean;Dry; Intact 2018  4:00 PM   Site Assessment Clean;Dry; Intact 2018  4:00 PM   Surrounding Skin Dry; Intact 2018  4:00 PM   Output (mL) 7 mL 2018  9:48 PM   Number of days: 3       Informed consent was obtained  Images reviewed prior to the procedure  A Time Out was performed  MONITORING:  Cardiac rhythm, pulse and pulse oximetry were monitored continuously during the procedure  Blood pressure was monitored every 3 minutes during the procedure  SEDATION: See Anesthesia Record for full details    PROCEDURE:  Standard airway preparation completed per respiratory therapy protocol  After appropriate level of sedation was achieved, a standard bronchoscope was inserted thru right Emily's and advanced to the level of the vocal cords  The vocal cords were normal in appearance and with symmetric motion during phonation  Topical lidocaine applied to the vocal cords  The bronchoscope was then advanced past the vocal cords into the trachea and advanced to the main judith  The trachea was normal in appearance  The main judith was normal in appearance  Airway survey was completed bilaterally to at least the second segmental level    The anatomy was normal   The mucosa was normal in appearance  There were no endobronchial masses, lesions, or obstructions noted  There was thick tenuous secretions bilateral lower lobes worse on the right  He had thick mucus plugs coming from the bronchus intermedius/right middle lobe  After aggressive suctioning requiring removal of the bronchoscope as the secretions were too thick to be suctioned, there were significant secretions also coming from the superior segment of the right lower lobe  After saline flushes and aggressive suctioning, I was able to clear all airways of thick secretions bilateral   There were no endobronchial lesions visualized  All airways were patent  PLAN:  · Sedation recovery per protocol  · Return to prior diet once topical analgesia has resolved  · Standard post bronchoscopy recovery instructions        Complications:   None    Patient Disposition:  PACU     SIGNATURE: Nando Martinez DO  DATE: December 14, 2018  TIME: 4:06 PM

## 2018-12-14 NOTE — ANESTHESIA POSTPROCEDURE EVALUATION
Post-Op Assessment Note      CV Status:  Stable    Mental Status:  Alert and awake    Hydration Status:  Euvolemic    PONV Controlled:  Controlled    Airway Patency:  Patent    Post Op Vitals Reviewed: Yes          Staff: CRNA, Anesthesiologist           BP  133/82   Temp     Pulse  67   Resp   22   SpO2   97% ON 6L

## 2018-12-14 NOTE — SOCIAL WORK
Patient not medically stable for discharge  Plan is for bronchoscopy today and it is likely that the chest tube can be discontinued after that  CM will continue to follow for possible O2 needs and any additional needs pt may have at DC

## 2018-12-14 NOTE — ASSESSMENT & PLAN NOTE
· No recurrence after repletion of K/Mag  · Keep potassium greater than 4 and magnesium greater than 2  · Continue metoprolol

## 2018-12-14 NOTE — ASSESSMENT & PLAN NOTE
· Right-sided hydropneumothorax   · Status post chest tube placement by IR on 12/11/18  · Chest tube on water seal   · Chest x-ray done today shows no pneumothorax  · Pulmonology following - input appreciated   · Likely chest tube can be discontinued after bronchoscopy today

## 2018-12-14 NOTE — ASSESSMENT & PLAN NOTE
· Currently on 6 L of supplemental oxygen via nasal cannula    ·   S/p IV lasix   · S/p chest tube  · Ct O2 to maintain sats greater than or equal to 88%  · Bronchoscopy today

## 2018-12-14 NOTE — ANESTHESIA PREPROCEDURE EVALUATION
Review of Systems/Medical History  Patient summary reviewed  Chart reviewed  No history of anesthetic complications     Cardiovascular  Hyperlipidemia, Hypertension , Past MI , CAD , PVD,    Pulmonary  Smoker cigarette smoker  , COPD moderate- medication dependent ,        GI/Hepatic    GI bleeding , history of,        Chronic kidney disease stage 3,        Endo/Other  Diabetes poorly controlled type 2 Oral agent,      GYN       Hematology  Anemia ,     Musculoskeletal  Negative musculoskeletal ROS        Neurology  Negative neurology ROS      Psychology   Negative psychology ROS              Physical Exam    Airway    Mallampati score:  I  TM Distance: >3 FB  Neck ROM: full     Dental   upper dentures and lower dentures,     Cardiovascular  Rhythm: regular, Rate: normal,     Pulmonary  Decreased breath sounds,     Other Findings      Lab Results   Component Value Date    WBC 10 32 (H) 2018    HGB 10 8 (L) 2018    HCT 32 3 (L) 2018    MCV 98 2018     2018     Lab Results   Component Value Date    K 4 5 2018    CO2 26 2018     2018    BUN 36 (H) 2018    CREATININE 1 84 (H) 2018     Lab Results   Component Value Date    INR 1 05 2018    PROTIME 13 4 2018     Lab Results   Component Value Date    PTT 24 (L) 2018       No results found for: GLUCOSE    Lab Results   Component Value Date    HGBA1C 8 7 (H) 12/10/2018       Type and Screen:  [address] [place] [address]  [phone]     Transthoracic Echocardiogram  2D, M-mode, Doppler, and Color Doppler     Study date:  11-Dec-2018     Patient: Alvaro Lafleur  MR number: [id number]  Account number: [de-[email or web address]  : 1940  Age: 66 years  Gender: Male  Status: Inpatient  Location: Bedside  Height: 62 in  Weight: 145 lb  BP: 155/ 69 mmHg     Indications: Coronary artery disease     Diagnoses: I25 10 - Atherosclerotic heart disease of native coronary artery without angina pectoris     Sonographer:  JESI Krishnan, RDCS  Primary Physician:  Lila Saenz MD  Referring Physician:  ANDREINA Ybarra  [facility]  [facility]  Interpreting Physician:  Collette Nail, MD     SUMMARY     LEFT VENTRICLE:  Systolic function was normal  Ejection fraction was estimated to be 60 %  There was akinesis of the basal-mid inferior wall(s)  There was mild concentric hypertrophy  Doppler parameters were consistent with abnormal left ventricular relaxation (grade 1 diastolic dysfunction)      RIGHT VENTRICLE:  The size was normal   Systolic function was normal      HISTORY: PRIOR HISTORY: Chest pain; Elevated troponin; Coronary artery disease; MI x 2; Hypertension; Hyperlipidemia; Diabetes Mellitus; COPD; CKD; PVD; Melena; Smoker    Anesthesia Plan  ASA Score- 4     Anesthesia Type- IV sedation with anesthesia with ASA Monitors  Additional Monitors:   Airway Plan:     Comment: Discussed with pt the possibility under sedation to have recall or mild discomfort        Plan Factors-Patient not instructed to abstain from smoking on day of procedure  Patient did not smoke on day of surgery  Induction- intravenous  Postoperative Plan-     Informed Consent- Anesthetic plan and risks discussed with patient  I personally reviewed this patient with the CRNA  Discussed and agreed on the Anesthesia Plan with the CRNA               Lab Results   Component Value Date    GLUC 245 (H) 12/14/2018    ALT 20 12/10/2018    AST 17 12/10/2018    BUN 36 (H) 12/14/2018    CALCIUM 9 1 12/14/2018     12/14/2018    CO2 26 12/14/2018    CREATININE 1 84 (H) 12/14/2018    HDL 47 12/11/2018    INR 1 05 12/09/2018    HCT 32 3 (L) 12/13/2018    HGB 10 8 (L) 12/13/2018    HGBA1C 8 7 (H) 12/10/2018    MG 2 0 12/13/2018     12/12/2018    K 4 5 12/14/2018    TRIG 214 (H) 12/11/2018    WBC 10 32 (H) 12/12/2018

## 2018-12-14 NOTE — ASSESSMENT & PLAN NOTE
Lab Results   Component Value Date    HGBA1C 8 7 (H) 12/10/2018       Recent Labs      12/13/18   1713  12/13/18   2108  12/14/18   0759  12/14/18   1055   POCGLU  206*  272*  250*  220*       Blood Sugar Average: Last 72 hrs:  (P) 249 5608    Oral medications held  Blood sugars increasing with increased oral intake  Lantus increased to 15 units daily, humalog 4 TID & SSI  Lantus held  in am for bronchoscopy today

## 2018-12-14 NOTE — PROGRESS NOTES
Progress Note - Prabhakar Mendoza 1940, 66 y o  male MRN: [id number]    Unit/Bed#: ENDO POOL Encounter: [id number]    Primary Care Provider: Neftaly Simeon MD   Date and time admitted to hospital: 12/9/2018 10:10 AM        * Acute respiratory failure with hypoxia ([address] )   Assessment & Plan    · Currently on 6 L of supplemental oxygen via nasal cannula  ·   S/p IV lasix   · S/p chest tube  · Ct O2 to maintain sats greater than or equal to 88%  · Bronchoscopy today     Dilated pancreatic duct   Assessment & Plan    · Dilated main pancreatic duct on CT  · Non emergent MRCP or ERCP     NSVT (nonsustained ventricular tachycardia) ([facility])   Assessment & Plan    · No recurrence after repletion of K/Mag  · Keep potassium greater than 4 and magnesium greater than 2  · Continue metoprolol     Right hydropneumothorax   Assessment & Plan    · Right-sided hydropneumothorax   · Status post chest tube placement by IR on 12/11/18  · Chest tube on water seal   · Chest x-ray done today shows no pneumothorax  · Pulmonology following - input appreciated   · Likely chest tube can be discontinued after bronchoscopy today  GI bleed   Assessment & Plan    · Acute upper GI bleed due to duodenal ulcer with visible vessel treated endoscopically  · No recurrence of bleed at present  · Hemoglobin stable  · Continue Protonix 40 mg twice daily  · Monitor hemoglobin  · Plavix restarted on 12/11/18  · Close outpatient GI follow-up       Acute kidney injury [facility])   Assessment & Plan    · CKD-3 with baseline creatinine 1 7 to 2  · Creatinine improved to 1 84  · Of Lasix since 12/11  · Renal US - no hydronephrosis  · Continue to Monitor BMP daily     Leukocytosis   Assessment & Plan    · Improved  · No evidence of infection  · Likely reactive       Chest pain   Assessment & Plan    · Felt to be likely due to anemia in the setting of coronary artery disease per Cardiology  · Elevated troponin to 0 4 felt to be likely a type 2 non STEMI due to acute anemia  · Transfuse to keep hemoglobin greater than 9  · Plavix restarted on 12/11  · Cardiology following - input appreciated     CAD (coronary artery disease)   Assessment & Plan    · Ct Plavix, BB     Hypertension   Assessment & Plan    · Intermittent increase when agitated  · Ct Amlodipine 5 mg daily, Metoprolol tartrate 25 mg BID     COPD (chronic obstructive pulmonary disease) ([facility])   Assessment & Plan    · No exacerbation  · Ct Symbicort (160/4 5) 2 puffs BID, Xopenex neb 1 25 mg TID  ·        DM2 (diabetes mellitus, type 2) ([facility] [address] )   Assessment & Plan    Lab Results   Component Value Date    HGBA1C 8 7 (H) 12/10/2018       Recent Labs      12/13/18   1713  12/13/18   2108  12/14/18   0759  12/14/18   1055   POCGLU  206*  272*  250*  220*       Blood Sugar Average: Last 72 hrs:  (P) 249 5625    Oral medications held  Blood sugars increasing with increased oral intake  Lantus increased to 15 units daily, humalog 4 TID & SSI  Lantus held  in am for bronchoscopy today  Stage 3 chronic kidney disease (HCC)   Assessment & Plan    · As noted above under NIDHI         VTE Pharmacologic Prophylaxis:   Pharmacologic: Heparin  Mechanical VTE Prophylaxis in Place: No    Patient Centered Rounds: I have performed bedside rounds with nursing staff today  Discussions with Specialists or Other Care Team Provider:  Discussed with pulmonology  Education and Discussions with Family / Patient:  Plan discussed with patient and son at bedside  Time Spent for Care: 30 minutes  More than 50% of total time spent on counseling and coordination of care as described above  Current Length of Stay: 5 day(s)    Current Patient Status: Inpatient   Certification Statement: The patient will continue to require additional inpatient hospital stay due to The bronchoscopy today    Discharge Plan:  Currently not stable for discharge  Code Status: Level 1 - Full Code      Subjective:   Patient denies worsening shortness of breath  Denies cough  Remains afebrile  Objective:     Vitals:   Temp (24hrs), Av 4 °F (36 9 °C), Min:96 9 °F (36 1 °C), Max:99 °F (37 2 °C)    Temp:  [96 9 °F (36 1 °C)-99 °F (37 2 °C)] 96 9 °F (36 1 °C)  HR:  [65-81] 68  Resp:  [18-20] 20  BP: (148-183)/(58-82) 183/82  SpO2:  [85 %-96 %] 90 %  Body mass index is 25 06 kg/m²  Input and Output Summary (last 24 hours): Intake/Output Summary (Last 24 hours) at 18 1233  Last data filed at 18 1001   Gross per 24 hour   Intake                0 ml   Output             1014 ml   Net            -1014 ml       Physical Exam:     Physical Exam   Constitutional: He is oriented to person, place, and time  No distress  HENT:   Head: Atraumatic  Mouth/Throat: Oropharynx is clear and moist    Eyes: Pupils are equal, round, and reactive to light  Conjunctivae are normal    Neck: Normal range of motion  Neck supple  No JVD present  Cardiovascular: Normal rate and regular rhythm  Pulmonary/Chest: Effort normal    Right-sided chest tube with no air leak  Decreased breath sounds bilaterally   Abdominal: Soft  Bowel sounds are normal    Musculoskeletal: He exhibits no edema  Neurological: He is alert and oriented to person, place, and time  Skin: Skin is warm  He is not diaphoretic  Psychiatric: He has a normal mood and affect     Additional Data:     Labs:      Results from last 7 days  Lab Units 18  0633 18  0616   WBC Thousand/uL  --  10 32*   HEMOGLOBIN g/dL 10 8* 10 3*   HEMATOCRIT % 32 3* 30 4*   PLATELETS Thousands/uL  --  213   NEUTROS PCT %  --  71   LYMPHS PCT %  --  16   MONOS PCT %  --  10   EOS PCT %  --  2       Results from last 7 days  Lab Units 18  0546  12/10/18  0452   SODIUM mmol/L 141  < > 140   POTASSIUM mmol/L 4 5  < > 3 7   CHLORIDE mmol/L 105  < > 108   CO2 mmol/L 26  < > 21   BUN mg/dL 36*  < > 42*   CREATININE mg/dL 1 84*  < > 1 75*   ANION GAP mmol/L 10  < > 11   CALCIUM mg/dL 9 1  < > 8 5   ALBUMIN g/dL --   --  2 6*   TOTAL BILIRUBIN mg/dL  --   --  0 30   ALK PHOS U/L  --   --  49   ALT U/L  --   --  20   AST U/L  --   --  17   GLUCOSE RANDOM mg/dL 245*  < > 132   < > = values in this interval not displayed  Results from last 7 days  Lab Units 12/09/18  1037   INR  1 05       Results from last 7 days  Lab Units 12/14/18  1055 12/14/18  0759 12/13/18  2108 12/13/18  1713 12/13/18  1136 12/13/18  0813 12/12/18  2112 12/12/18  1551 12/12/18  1102 12/12/18  0818 12/11/18  2126 12/11/18  1705   POC GLUCOSE mg/dl 220* 250* 272* 206* 281* 261* 323* 340* 350* 264* 250* 222*       Results from last 7 days  Lab Units 12/10/18  1247   HEMOGLOBIN A1C % 8 7*       Results from last 7 days  Lab Units 12/11/18  1259 12/10/18  1247 12/09/18  1351 12/09/18  1140   LACTIC ACID mmol/L  --   --  0 9 3 1*   PROCALCITONIN ng/ml 0 12 <0 05 0 06  --            * I Have Reviewed All Lab Data Listed Above  * Additional Pertinent Lab Tests Reviewed: Roberto 66 Admission Reviewed    Imaging:    Imaging Reports Reviewed Today Include: NA  Recent Cultures (last 7 days):       Results from last 7 days  Lab Units 12/09/18  1140   BLOOD CULTURE  No Growth After 4 Days  No Growth After 4 Days         Last 24 Hours Medication List:     Current Facility-Administered Medications:  albuterol 2 5 mg Nebulization Q4H PRN Jeanmarie Rose MD   amLODIPine 5 mg Oral Daily ANDREINA Henson   [START ON 12/15/2018] aspirin 81 mg Oral Daily Roxie Ramon MD   budesonide-formoterol 2 puff Inhalation BID Cynthia Wu PA-C   clopidogrel 75 mg Oral Daily ANDREINA Henson   insulin glargine 15 Units Subcutaneous QAM Irvin Sheehan MD   insulin lispro 1-5 Units Subcutaneous TID Elvan Alpers, MD   insulin lispro 1-5 Units Subcutaneous HS Irvin Sheehan MD   insulin lispro 4 Units Subcutaneous TID With Meals Irvin Sheehan MD   levalbuterol 1 25 mg Nebulization TID Jeanmarie Rose MD   metoprolol tartrate 25 mg Oral Q12H [address] Gale Bartlett MD   nitroglycerin 0 4 mg Sublingual Q5 Min PRN Diane Wharton PA-C   pantoprazole 40 mg Oral BID AC Pk Li MD   pravastatin 40 mg Oral Daily With Pal Mares MD   sodium chloride 3 mL Nebulization TID Arsen Mejia MD        Today, Patient Was Seen By: Amaris Stringer MD    ** Please Note: Dictation voice to text software may have been used in the creation of this document   **

## 2018-12-14 NOTE — PLAN OF CARE
Problem: DISCHARGE PLANNING - CARE MANAGEMENT  Goal: Discharge to post-acute care or home with appropriate resources  INTERVENTIONS:  - Conduct assessment to determine patient/family and health care team treatment goals, and need for post-acute services based on payer coverage, community resources, and patient preferences, and barriers to discharge  - Address psychosocial, clinical, and financial barriers to discharge as identified in assessment in conjunction with the patient/family and health care team  - Arrange appropriate level of post-acute services according to patients   needs and preference and payer coverage in collaboration with the physician and health care team  - Communicate with and update the patient/family, physician, and health care team regarding progress on the discharge plan  - Arrange appropriate transportation to post-acute venues   Outcome: Progressing  Patient not medically stable for discharge  Plan is for bronchoscopy today and it is likely that the chest tube can be discontinued after that  CM will continue to follow for possible O2 needs and any additional needs pt may have at DC

## 2018-12-14 NOTE — ASSESSMENT & PLAN NOTE
· CKD-3 with baseline creatinine 1 7 to 2  · Creatinine improved to 1 84  · Of Lasix since 12/11  · Renal US - no hydronephrosis  · Continue to Monitor BMP daily

## 2018-12-14 NOTE — PROGRESS NOTES
Progress Note - Pulmonary   Elvin Partida 66 y o  male MRN: [id number]  Unit/Bed#: -01 Encounter: [id number]    Assessment/Plan:    Acute hypoxic respiratory failure, multifactorial due to below - improved              Titrate FiO2 to maintain saturations greater than or equal to 88%              Out of bed as tolerated               Continue IS      Abnormal chest x-ray/CT Chest with Right Hydropneumothorax S/P CT 12/12/18 and scattered parenchymal opacities (atelectasis versus pneumonitis) with 4 mm RENETTA nodule              Continue chest tube to water seal today  NPO for bronchoscopy today for evaluation of RML atelectasis  Not on AC due to GI bleed               Will need repeat CT Chest ~ 8-12 weeks      Emphysema/COPD of unknown severity without acute exacerbation              Continue Symbicort and Xopenex TID              NE needs for steroids at this time               ML would benefit from outpatient pulmonary follow-up and PFTs     Acute blood loss anemia due to GI bleed in the setting of duodenal ulcer              Management per primary team and GI               Hemoglobin stable      Active tobacco abuse              Smoking cessation and nicotine replacement therapy      Outpatient follow-up as per D/C instructions  Repeat imaging pending hospital course/results of bronchoscopy  Discussed with Internal Medicine and nursing  Can likely be med surg tele after bronchoscopy today pending results  Chief Complaint:    I feel pretty good      Subjective:    Harjit reports he is feeling better  He denies any cough or sputum production above baseline  He denies any shortness of breath  He denies chest pain or chest tube site pain  He is NPO for his procedure today  Objective:    Vitals: Blood pressure 169/73, pulse 67, temperature 98 4 °F (36 9 °C), temperature source Oral, resp  rate 18, height 5' 2" (1 575 m), weight 62 2 kg (137 lb 2 oz), SpO2 (!) 85 %    95% on 5 liters nasal cannula oxygen while in the room,Body mass index is 25 08 kg/m²  Intake/Output Summary (Last 24 hours) at 12/14/18 1054  Last data filed at 12/14/18 1001   Gross per 24 hour   Intake                0 ml   Output             1019 ml   Net            -1019 ml       Invasive Devices     Peripheral Intravenous Line            Long-Dwell Peripheral IV (Midline) 87/43/03 Right Basilic 3 days          Drain            Chest Tube 1 Anterior Midaxillary 8 Fr  2 days                Physical Exam:     Physical Exam   Constitutional: He is oriented to person, place, and time  He appears well-developed and well-nourished  He is cooperative  Non-toxic appearance  No distress  HENT:   Head: Normocephalic and atraumatic  Mouth/Throat: No oropharyngeal exudate  Eyes: EOM are normal  No scleral icterus  Neck: Neck supple  No JVD present  No tracheal deviation present  Cardiovascular: Normal rate, regular rhythm, S1 normal and S2 normal   Exam reveals no gallop and no friction rub  No murmur heard  Pulmonary/Chest: Effort normal and breath sounds normal  No accessory muscle usage or stridor  No tachypnea  No respiratory distress  He has no decreased breath sounds  He has no wheezes  He has no rhonchi  He has no rales  He exhibits no tenderness  Right chest tube in place without crepitus or air leak  Dressing intact  Abdominal: Soft  Bowel sounds are normal  He exhibits no distension  There is no tenderness  There is no rebound and no guarding  Musculoskeletal: He exhibits no edema or tenderness  Neurological: He is alert and oriented to person, place, and time  He has normal strength  GCS eye subscore is 4  GCS verbal subscore is 5  GCS motor subscore is 6  Skin: Skin is warm and dry  No abrasion, no ecchymosis, no lesion and no rash noted  He is not diaphoretic  No cyanosis or erythema  Nails show no clubbing  Psychiatric: He has a normal mood and affect   His speech is normal and behavior is normal  Vitals reviewed        Labs:   CMP:   Lab Results   Component Value Date    SODIUM 141 12/14/2018    K 4 5 12/14/2018     12/14/2018    CO2 26 12/14/2018    BUN 36 (H) 12/14/2018    CREATININE 1 84 (H) 12/14/2018    CALCIUM 9 1 12/14/2018    EGFR 34 12/14/2018       Imaging and other studies: None today

## 2018-12-14 NOTE — PROGRESS NOTES
Progress Note - Cardiology   General Anamika 66 y o  male MRN: [id number]  Encounter: [id number]  12/14/18  9:27 AM        Assessment/Plan:    1  CAD with type II NSTEMI  On plavix, beta blocker, statin  No further chest pain  It is possible the chest pain was either related to his pneumothorax or because of his symptomatic anemia  Given the possible regional wall motion abnormality on his echocardiogram, however, he should have an outpatient stress test for risk stratification  Start aspirin  2  HTN  On amlodipine 5, metoprolol 25 b i d  Home chlorthalidone 25 and lisinopril 40 are held  Blood pressure is intermittently elevated, consider resuming 1 of his home medications (held on admission due to NIDHI) or increasing amlodipine to 5 b i d  If remains above goal     3  HL  Was on pravastatin 40 mg daily prior to admission, will resume now  LDL 46 12/18, TG elevated 214, likely due to DM  4  Acute GI bleed and anemia  Found to have duodenal ulcer with visible vessel treated endoscopically  Hemoglobin currently stable  5  DM  Hgb A1c 12/18 8 7%  On insulin  6  CKD III  Baseline creatinine around 1 7  Creatinine was elevated up to 2 2, now trending down  7  NSVT  No further NSVT on tele since electrolytes repleted  On beta blocker  Follows with [facility] Dr Jose Berry as outpt  Will sign off, call with questions  Subjective/Objective   Chief Complaint:   Chief Complaint   Patient presents with    Black or Bloody Stool     Pt reports having blood in his stool beginning yesterday  Pt reports bright red blood when wiping and filling up the topilet bowl  Pt denies abd pain, N/V, reports taking plavix and asa daily            Subjective:  80-year-old man with a history of diabetes, tobacco use, hypertension, hyperlipidemia, known history of underlying coronary artery disease as well as peripheral vascular disease, emphysema, CKD stage 3 with baseline creatinine of 1 72, who presented with melena as well as bright red blood in stool  He was found to be anemic with a hemoglobin as low as 7 2  In this setting he developed some chest discomfort  Peak troponin was 0 4  He also developed a right hydropneumothorax requiring placement of a chest tube  Echocardiogram during this hospitalization showed an EF of 60% with akinesis of the basal to mid inferior wall  Given his issues with recent anemia from duodenal ulcer, he is being currently medically managed as able for his CAD  He reports he has not had any further chest discomfort since that 1 episode  He denies any palpitations  He reports he is NPO for likely bronch  No issues with lower extremity edema  No issues with dizziness or lightheadedness  No issues with orthopnea or PND  He is sleeping at a relatively high incline  Chest tube remains in place  No fevers        Patient Active Problem List   Diagnosis    Stage 3 chronic kidney disease ([place] [address] )    DM2 (diabetes mellitus, type 2) ([place] [address] )    COPD (chronic obstructive pulmonary disease) ([place] [address] )    Hypertension    CAD (coronary artery disease)    Chest pain    Leukocytosis    Acute kidney injury ([place] [address] )    Elevated lipase    GI bleed    Pneumothorax    Atelectasis of right lung    [facility]    NSVT (nonsustained ventricular tachycardia) ([place] [address] )    Dilated pancreatic duct     Past Medical History:   Diagnosis Date    BPH (benign prostatic hyperplasia)     CKD (chronic kidney disease)     COPD (chronic obstructive pulmonary disease) ([place] [address] )     Coronary artery disease     Diabetes mellitus ([place] [address] )     History of heart attack     Hyperlipidemia     Hypertension     PAD (peripheral artery disease) ([facility])        Allergies   Allergen Reactions    Contrast [Iodinated Diagnostic Agents]        Current Facility-Administered Medications   Medication Dose Route Frequency Provider Last Rate Last Dose    albuterol inhalation solution 2 5 mg  2 5 mg Nebulization Q4H PRN Yamilet Cook MD        amLODIPine (NORVASC) tablet 5 mg  5 mg Oral Daily ANDREINA Ramesh   5 mg at 12/13/18 3513    budesonide-formoterol (SYMBICORT) 160-4 5 mcg/act inhaler 2 puff  2 puff Inhalation BID Queenie Baumgarten, PA-C   2 puff at 12/13/18 1758    clopidogrel (PLAVIX) tablet 75 mg  75 mg Oral Daily ANDREINA Ramesh   75 mg at 12/13/18 0834    insulin glargine (LANTUS) subcutaneous injection 15 Units 0 15 mL  15 Units Subcutaneous QAM Clarissa Gilliam MD        insulin lispro (HumaLOG) 100 units/mL subcutaneous injection 1-5 Units  1-5 Units Subcutaneous TID [facility] Clarissa Gilliam MD   1 Units at 12/13/18 1757    insulin lispro (HumaLOG) 100 units/mL subcutaneous injection 1-5 Units  1-5 Units Subcutaneous HS Clarissa Gilliam MD   2 Units at 12/13/18 2144    insulin lispro (HumaLOG) 100 units/mL subcutaneous injection 4 Units  4 Units Subcutaneous TID With Meals Clarissa Gilliam MD   4 Units at 12/13/18 1757    levalbuterol [facility]) inhalation solution 1 25 mg  1 25 mg Nebulization TID Yamilet Cook MD   1 25 mg at 12/14/18 0739    metoprolol tartrate (LOPRESSOR) tablet 25 mg  25 mg Oral Q12H [address] Yasmeen Santiago MD   25 mg at 12/13/18 2144    nitroglycerin (NITROSTAT) SL tablet 0 4 mg  0 4 mg Sublingual Q5 Min PRN Diane Wharton PA-C   0 4 mg at 12/10/18 2334    pantoprazole (PROTONIX) EC tablet 40 mg  40 mg Oral BID AC Yvan Spangler MD   40 mg at 12/13/18 1758    sodium chloride 0 9 % inhalation solution 3 mL  3 mL Nebulization TID Yamilet Cook MD   3 mL at 12/14/18 0739       Vitals: BP (!) 180/58 (BP Location: Left arm)   Pulse 65   Temp 99 °F (37 2 °C) (Oral)   Resp 18   Ht 5' 2" (1 575 m)   Wt 62 2 kg (137 lb 2 oz)   SpO2 95%   BMI 25 08 kg/m²     Intake/Output Summary (Last 24 hours) at 12/14/18 0927  Last data filed at 12/14/18 0300   Gross per 24 hour   Intake                0 ml   Output              669 ml   Net             -669 ml     Wt Readings from Last 3 Encounters:   12/14/18 62 2 kg (137 lb 2 oz)   05/11/18 64 5 kg (142 lb 3 2 oz)       Body mass index is 25 08 kg/m²  ,     Vitals:    12/13/18 1905 12/13/18 2300 12/14/18 0300 12/14/18 0800   BP: 148/66 160/60 168/58 (!) 180/58   Pulse: 79 76 68 65   Patient Position - Orthostatic VS: Lying Lying Lying Lying       Physical Exam:     GEN: Alert and oriented x 3, in no acute distress  HEENT: Sclera anicteric, conjunctivae pink, mucous membranes moist   NECK: Supple, no carotid bruits, no significant JVD  HEART: Regular rhythm, normal S1 and S2, no murmurs, clicks, gallops or rubs  LUNGS: Clear to auscultation anteriorly bilaterally; no wheezes, rales, or rhonchi   ABDOMEN: Soft, nontender, nondistended, normoactive bowel sounds  EXTREMITIES: Skin warm and well perfused, no clubbing, cyanosis, or edema  NEURO: No focal findings  SKIN: Normal without suspicious lesions on exposed skin        Lab Results:     BMP:  Results from last 7 days  Lab Units 12/14/18  0546 12/13/18  0633 12/12/18  0616 12/10/18  0452 12/09/18  1037   POTASSIUM mmol/L 4 5 4 0 3 0* 3 7 4 2   CHLORIDE mmol/L 105 104 102 108 102   CO2 mmol/L 26 22 24 21 22   BUN mg/dL 36* 34* 41* 42* 68*   CREATININE mg/dL 1 84* 2 21* 2 07* 1 75* 2 08*   CALCIUM mg/dL 9 1 8 9 8 7 8 5 8 7       CBC:   Results from last 7 days  Lab Units 12/13/18  0633 12/12/18  0616 12/11/18  2019 12/11/18  1259 12/11/18  0512 12/11/18  0005 12/10/18  1912  12/10/18  0452 12/09/18  2256  12/09/18  1037   WBC Thousand/uL  --  10 32*  --  15 91*  --   --   --   --  14 30*  --   --  16 14*   HEMOGLOBIN g/dL 10 8* 10 3* 9 9* 10 9* 10 0* 9 6* 9 4*  < > 8 8* 8 4*  < > 9 8*   HEMATOCRIT % 32 3* 30 4*  --  31 9*  --   --   --   --  26 2* 25 2*  --  29 5*   MCV fL  --  98  --  99*  --   --   --   --  102*  --   --  102*   PLATELETS Thousands/uL  --  213  --  246  --   --   --   --  212  --   --  265   MCH pg  --  33 3  --  33 6  --   --   --   --  34 2  --   -- 33 8   MCHC g/dL  --  33 9  --  34 2  --   --   --   --  33 6  --   --  33 2   RDW %  --  15 1  --  15 4*  --   --   --   --  14 6  --   --  14 2   MPV fL  --  11 2  --  11 1  --   --   --   --  11 0  --   --  11 9   NRBC AUTO /100 WBCs  --  0  --  0  --   --   --   --  0  --   --  0   < > = values in this interval not displayed  Results from last 7 days  Lab Units 12/10/18  2024 12/10/18  1912 12/10/18  1543   TROPONIN I ng/mL 0 35* 0 40* 0 27*                 Results from last 7 days  Lab Units 12/13/18  0633 12/12/18  0616 12/11/18  1259   MAGNESIUM mg/dL 2 0 1 7 1 6       INR:     Results from last 7 days  Lab Units 12/09/18  1037   INR  1 05       Lipid Profile:   No results found for: CHOL  Lab Results   Component Value Date    HDL 47 12/11/2018     Lab Results   Component Value Date    LDLCALC 46 12/11/2018     Lab Results   Component Value Date    TRIG 214 (H) 12/11/2018         Hgb A1c:   Results from last 7 days  Lab Units 12/10/18  1247   HEMOGLOBIN A1C % 8 7*         Telemetry: personally reviewed, SR, no further NSVT

## 2018-12-14 NOTE — ASSESSMENT & PLAN NOTE
· Acute upper GI bleed due to duodenal ulcer with visible vessel treated endoscopically  · No recurrence of bleed at present  · Hemoglobin stable  · Continue Protonix 40 mg twice daily  · Monitor hemoglobin  · Plavix restarted on 12/11/18  · Close outpatient GI follow-up

## 2018-12-15 ENCOUNTER — APPOINTMENT (INPATIENT)
Dept: RADIOLOGY | Facility: HOSPITAL | Age: 78
DRG: 377 | End: 2018-12-15
Payer: COMMERCIAL

## 2018-12-15 LAB
ANION GAP SERPL CALCULATED.3IONS-SCNC: 9 MMOL/L (ref 4–13)
BASOPHILS # BLD AUTO: 0.04 THOUSANDS/ΜL (ref 0–0.1)
BASOPHILS NFR BLD AUTO: 0 % (ref 0–1)
BUN SERPL-MCNC: 40 MG/DL (ref 5–25)
CALCIUM SERPL-MCNC: 9 MG/DL (ref 8.3–10.1)
CHLORIDE SERPL-SCNC: 101 MMOL/L (ref 100–108)
CO2 SERPL-SCNC: 27 MMOL/L (ref 21–32)
CREAT SERPL-MCNC: 1.83 MG/DL (ref 0.6–1.3)
EOSINOPHIL # BLD AUTO: 0.24 THOUSAND/ΜL (ref 0–0.61)
EOSINOPHIL NFR BLD AUTO: 2 % (ref 0–6)
ERYTHROCYTE [DISTWIDTH] IN BLOOD BY AUTOMATED COUNT: 15 % (ref 11.6–15.1)
GFR SERPL CREATININE-BSD FRML MDRD: 35 ML/MIN/1.73SQ M
GLUCOSE SERPL-MCNC: 221 MG/DL (ref 65–140)
GLUCOSE SERPL-MCNC: 230 MG/DL (ref 65–140)
GLUCOSE SERPL-MCNC: 233 MG/DL (ref 65–140)
GLUCOSE SERPL-MCNC: 240 MG/DL (ref 65–140)
GLUCOSE SERPL-MCNC: 251 MG/DL (ref 65–140)
HCT VFR BLD AUTO: 31.3 % (ref 36.5–49.3)
HGB BLD-MCNC: 10.4 G/DL (ref 12–17)
IMM GRANULOCYTES # BLD AUTO: 0.15 THOUSAND/UL (ref 0–0.2)
IMM GRANULOCYTES NFR BLD AUTO: 1 % (ref 0–2)
LYMPHOCYTES # BLD AUTO: 2.63 THOUSANDS/ΜL (ref 0.6–4.47)
LYMPHOCYTES NFR BLD AUTO: 19 % (ref 14–44)
MCH RBC QN AUTO: 34 PG (ref 26.8–34.3)
MCHC RBC AUTO-ENTMCNC: 33.2 G/DL (ref 31.4–37.4)
MCV RBC AUTO: 102 FL (ref 82–98)
MONOCYTES # BLD AUTO: 1.21 THOUSAND/ΜL (ref 0.17–1.22)
MONOCYTES NFR BLD AUTO: 9 % (ref 4–12)
NEUTROPHILS # BLD AUTO: 9.3 THOUSANDS/ΜL (ref 1.85–7.62)
NEUTS SEG NFR BLD AUTO: 69 % (ref 43–75)
NRBC BLD AUTO-RTO: 0 /100 WBCS
PLATELET # BLD AUTO: 284 THOUSANDS/UL (ref 149–390)
PMV BLD AUTO: 10.9 FL (ref 8.9–12.7)
POTASSIUM SERPL-SCNC: 4.1 MMOL/L (ref 3.5–5.3)
RBC # BLD AUTO: 3.06 MILLION/UL (ref 3.88–5.62)
SODIUM SERPL-SCNC: 137 MMOL/L (ref 136–145)
WBC # BLD AUTO: 13.57 THOUSAND/UL (ref 4.31–10.16)

## 2018-12-15 PROCEDURE — 99232 SBSQ HOSP IP/OBS MODERATE 35: CPT | Performed by: INTERNAL MEDICINE

## 2018-12-15 PROCEDURE — 71046 X-RAY EXAM CHEST 2 VIEWS: CPT

## 2018-12-15 PROCEDURE — 80048 BASIC METABOLIC PNL TOTAL CA: CPT | Performed by: INTERNAL MEDICINE

## 2018-12-15 PROCEDURE — 82948 REAGENT STRIP/BLOOD GLUCOSE: CPT

## 2018-12-15 PROCEDURE — 94760 N-INVAS EAR/PLS OXIMETRY 1: CPT

## 2018-12-15 PROCEDURE — 85025 COMPLETE CBC W/AUTO DIFF WBC: CPT | Performed by: INTERNAL MEDICINE

## 2018-12-15 PROCEDURE — 94664 DEMO&/EVAL PT USE INHALER: CPT

## 2018-12-15 PROCEDURE — 94640 AIRWAY INHALATION TREATMENT: CPT

## 2018-12-15 RX ORDER — INSULIN GLARGINE 100 [IU]/ML
18 INJECTION, SOLUTION SUBCUTANEOUS EVERY MORNING
Status: DISCONTINUED | OUTPATIENT
Start: 2018-12-16 | End: 2018-12-16 | Stop reason: HOSPADM

## 2018-12-15 RX ORDER — INSULIN GLARGINE 100 [IU]/ML
3 INJECTION, SOLUTION SUBCUTANEOUS ONCE
Status: COMPLETED | OUTPATIENT
Start: 2018-12-15 | End: 2018-12-15

## 2018-12-15 RX ORDER — AMLODIPINE BESYLATE 2.5 MG/1
2.5 TABLET ORAL ONCE
Status: COMPLETED | OUTPATIENT
Start: 2018-12-15 | End: 2018-12-15

## 2018-12-15 RX ADMIN — CLOPIDOGREL 75 MG: 75 TABLET, FILM COATED ORAL at 09:27

## 2018-12-15 RX ADMIN — INSULIN LISPRO 2 UNITS: 100 INJECTION, SOLUTION INTRAVENOUS; SUBCUTANEOUS at 22:19

## 2018-12-15 RX ADMIN — INSULIN GLARGINE 15 UNITS: 100 INJECTION, SOLUTION SUBCUTANEOUS at 09:26

## 2018-12-15 RX ADMIN — METOPROLOL TARTRATE 25 MG: 25 TABLET, FILM COATED ORAL at 09:27

## 2018-12-15 RX ADMIN — ISODIUM CHLORIDE 3 ML: 0.03 SOLUTION RESPIRATORY (INHALATION) at 20:29

## 2018-12-15 RX ADMIN — LEVALBUTEROL HYDROCHLORIDE 1.25 MG: 1.25 SOLUTION, CONCENTRATE RESPIRATORY (INHALATION) at 07:14

## 2018-12-15 RX ADMIN — ISODIUM CHLORIDE 3 ML: 0.03 SOLUTION RESPIRATORY (INHALATION) at 07:14

## 2018-12-15 RX ADMIN — INSULIN LISPRO 2 UNITS: 100 INJECTION, SOLUTION INTRAVENOUS; SUBCUTANEOUS at 16:43

## 2018-12-15 RX ADMIN — ISODIUM CHLORIDE 3 ML: 0.03 SOLUTION RESPIRATORY (INHALATION) at 13:51

## 2018-12-15 RX ADMIN — INSULIN GLARGINE 3 UNITS: 100 INJECTION, SOLUTION SUBCUTANEOUS at 11:11

## 2018-12-15 RX ADMIN — METOPROLOL TARTRATE 25 MG: 25 TABLET, FILM COATED ORAL at 22:00

## 2018-12-15 RX ADMIN — PANTOPRAZOLE SODIUM 40 MG: 40 TABLET, DELAYED RELEASE ORAL at 16:43

## 2018-12-15 RX ADMIN — BUDESONIDE AND FORMOTEROL FUMARATE DIHYDRATE 2 PUFF: 160; 4.5 AEROSOL RESPIRATORY (INHALATION) at 17:55

## 2018-12-15 RX ADMIN — INSULIN LISPRO 1 UNITS: 100 INJECTION, SOLUTION INTRAVENOUS; SUBCUTANEOUS at 11:12

## 2018-12-15 RX ADMIN — ASPIRIN 81 MG: 81 TABLET, COATED ORAL at 09:27

## 2018-12-15 RX ADMIN — PANTOPRAZOLE SODIUM 40 MG: 40 TABLET, DELAYED RELEASE ORAL at 09:27

## 2018-12-15 RX ADMIN — LEVALBUTEROL HYDROCHLORIDE 1.25 MG: 1.25 SOLUTION, CONCENTRATE RESPIRATORY (INHALATION) at 20:29

## 2018-12-15 RX ADMIN — BUDESONIDE AND FORMOTEROL FUMARATE DIHYDRATE 2 PUFF: 160; 4.5 AEROSOL RESPIRATORY (INHALATION) at 09:26

## 2018-12-15 RX ADMIN — PRAVASTATIN SODIUM 40 MG: 40 TABLET ORAL at 17:55

## 2018-12-15 RX ADMIN — AMLODIPINE BESYLATE 2.5 MG: 2.5 TABLET ORAL at 11:11

## 2018-12-15 RX ADMIN — LEVALBUTEROL HYDROCHLORIDE 1.25 MG: 1.25 SOLUTION, CONCENTRATE RESPIRATORY (INHALATION) at 13:51

## 2018-12-15 RX ADMIN — INSULIN LISPRO 2 UNITS: 100 INJECTION, SOLUTION INTRAVENOUS; SUBCUTANEOUS at 09:24

## 2018-12-15 RX ADMIN — AMLODIPINE BESYLATE 5 MG: 5 TABLET ORAL at 09:27

## 2018-12-15 NOTE — PROGRESS NOTES
Progress Note - Barb Cordova 1940, 66 y o  male MRN: [id number]    Unit/Bed#: -01 Encounter: [id number]    Primary Care Provider: Ree Flores MD   Date and time admitted to hospital: 12/9/2018 10:10 AM        Right hydropneumothorax   Assessment & Plan    · Right-sided hydropneumothorax   · Status post chest tube placement by IR on 12/11/18 - removed by pulmonology today  · Repeat chest x-ray in am   ·        GI bleed   Assessment & Plan    · Acute upper GI bleed due to duodenal ulcer with visible vessel treated endoscopically  · No recurrence of bleed at present  · Hemoglobin stable  · Continue Protonix 40 mg twice daily  · Monitor hemoglobin  · Plavix restarted on 12/11/18  · Close outpatient GI follow-up  Atelectasis of right lung   Assessment & Plan    · Persistent collapse of right middle lobe   · Bronchoscopy on 12/14/18 - extensive mucous plugging noted - airway cleared with aggressive suctioning and saline flushes     Chest pain   Assessment & Plan    · Felt to be likely due to anemia in the setting of coronary artery disease per Cardiology  · Elevated troponin to 0 4 felt to be likely a type 2 non STEMI due to acute anemia  · Transfuse to keep hemoglobin greater than 9  · Plavix restarted on 12/11  ·      * Acute respiratory failure with hypoxia (HCC)   Assessment & Plan    · Multifactorial - due to mucus plugging, right-sided pneumothorax and volume overload  · Currently on 4 L of supplemental oxygen via nasal cannula    ·   S/p IV lasix   · Status post chest tube placement and removal for right-sided pneumothorax  · Status post bronchoscopy with aggressive suctioning of mucus plug  · Ct O2 to maintain sats greater than or equal to 88%  ·      Acute kidney injury [facility])   Assessment & Plan    · CKD-3 with baseline creatinine 1 7 to 2  · Creatinine had increased to 2 2 - improved to 1 83  · Off Lasix since 12/11  · Renal US - no hydronephrosis  · Continue to monitor BMP daily     CAD (coronary artery disease)   Assessment & Plan    · Ct Plavix, BB     COPD (chronic obstructive pulmonary disease) ([facility])   Assessment & Plan    · No exacerbation  · Ct Symbicort (160/4 5) 2 puffs BID, Xopenex neb 1 25 mg TID         Dilated pancreatic duct   Assessment & Plan    · Dilated main pancreatic duct on CT  · Non emergent MRCP or ERCP     DM2 (diabetes mellitus, type 2) [facility])   Assessment & Plan    Lab Results   Component Value Date    HGBA1C 8 7 (H) 12/10/2018       Recent Labs      12/14/18   2104  12/15/18   0807  12/15/18   1112  12/15/18   1610   POCGLU  206*  251*  221*  240*       Blood Sugar Average: Last 72 hrs:  (P) 263    Oral medications held  Blood sugars not at goal  Increase dose of Lantus to 18 units daily, continue Humalog 4 units t i d  and SSI   Hypertension   Assessment & Plan    · Intermittent increase when agitated  · Ct Amlodipine 5 mg daily, Metoprolol tartrate 25 mg BID     NSVT (nonsustained ventricular tachycardia) ([facility])   Assessment & Plan    · No recurrence after repletion of K/Mag  · Keep potassium greater than 4 and magnesium greater than 2  · Continue metoprolol         VTE Pharmacologic Prophylaxis:   Pharmacologic: Pharmacologic VTE Prophylaxis contraindicated due to GI bleed  Mechanical VTE Prophylaxis in Place: Yes    Patient Centered Rounds: I have performed bedside rounds with nursing staff today  Discussions with Specialists or Other Care Team Provider:  Discussed with pulmonology    Education and Discussions with Family / Patient:  Discussed with son on the phone    Time Spent for Care: 20 minutes  More than 50% of total time spent on counseling and coordination of care as described above      Current Length of Stay: 6 day(s)    Current Patient Status: Inpatient   Certification Statement: The patient will continue to require additional inpatient hospital stay due to Right-sided hydropneumothorax status post chest chest tube removal today - awaiting repeat x-ray in a m  Code Status: Level 1 - Full Code    Subjective:   Shortness of breath improved  Right-sided chest tube removed today  O2 requirements improving  No cough  No chest pain    Objective:     Vitals:   Temp (24hrs), Av 3 °F (36 8 °C), Min:97 5 °F (36 4 °C), Max:99 °F (37 2 °C)    Temp:  [97 5 °F (36 4 °C)-99 °F (37 2 °C)] 99 °F (37 2 °C)  HR:  [71-82] 82  Resp:  [18] 18  BP: (158-187)/(69-99) 163/73  SpO2:  [95 %-96 %] 95 %  Body mass index is 25 04 kg/m²  Physical Exam:     Physical Exam   Constitutional: He is oriented to person, place, and time  HENT:   Head: Atraumatic  Eyes: EOM are normal    Neck: Neck supple  No JVD present  No tracheal deviation present  No thyromegaly present  Cardiovascular: Normal rate, regular rhythm and normal heart sounds  Pulmonary/Chest: Effort normal  No respiratory distress  He has no wheezes  He has no rales  Abdominal: Soft  Bowel sounds are normal  He exhibits no distension  There is no tenderness  There is no rebound  Musculoskeletal: He exhibits no edema  Neurological: He is alert and oriented to person, place, and time  Skin: Skin is warm and dry  Psychiatric: He has a normal mood and affect         Additional Data:     Labs:      Results from last 7 days  Lab Units 12/15/18  0755   WBC Thousand/uL 13 57*   HEMOGLOBIN g/dL 10 4*   HEMATOCRIT % 31 3*   PLATELETS Thousands/uL 284   NEUTROS PCT % 69   LYMPHS PCT % 19   MONOS PCT % 9   EOS PCT % 2       Results from last 7 days  Lab Units 12/15/18  0755  12/10/18  0452   SODIUM mmol/L 137  < > 140   POTASSIUM mmol/L 4 1  < > 3 7   CHLORIDE mmol/L 101  < > 108   CO2 mmol/L 27  < > 21   BUN mg/dL 40*  < > 42*   CREATININE mg/dL 1 83*  < > 1 75*   ANION GAP mmol/L 9  < > 11   CALCIUM mg/dL 9 0  < > 8 5   ALBUMIN g/dL  --   --  2 6*   TOTAL BILIRUBIN mg/dL  --   --  0 30   ALK PHOS U/L  --   --  49   ALT U/L  --   --  20   AST U/L  --   --  17   GLUCOSE RANDOM mg/dL 230*  < > 132   < > = values in this interval not displayed  Results from last 7 days  Lab Units 12/09/18  1037   INR  1 05       Results from last 7 days  Lab Units 12/15/18  1610 12/15/18  1112 12/15/18  0807 12/14/18  2104 12/14/18  1609 12/14/18  1055 12/14/18  0759 12/13/18  2108 12/13/18  1713 12/13/18  1136 12/13/18  0813 12/12/18  2112   POC GLUCOSE mg/dl 240* 221* 251* 206* 260* 220* 250* 272* 206* 281* 261* 323*       Results from last 7 days  Lab Units 12/10/18  1247   HEMOGLOBIN A1C % 8 7*       Results from last 7 days  Lab Units 12/11/18  1259 12/10/18  1247 12/09/18  1351 12/09/18  1140   LACTIC ACID mmol/L  --   --  0 9 3 1*   PROCALCITONIN ng/ml 0 12 <0 05 0 06  --      * I Have Reviewed All Lab Data Listed Above  * Additional Pertinent Lab Tests Reviewed: Roberto 66 Admission Reviewed    Recent Cultures (last 7 days):       Results from last 7 days  Lab Units 12/09/18  1140   BLOOD CULTURE  No Growth After 5 Days  No Growth After 5 Days         Last 24 Hours Medication List:     Current Facility-Administered Medications:  albuterol 2 5 mg Nebulization Q4H PRN MD Tiffani Hopkins ON 12/16/2018] amLODIPine 7 5 mg Oral Daily Amber Perdomo MD   aspirin 81 mg Oral Daily Beryl Todd MD   budesonide-formoterol 2 puff Inhalation BID Deon Belle PA-C   clopidogrel 75 mg Oral Daily [place] [address]   [START ON 12/16/2018] insulin glargine 18 Units Subcutaneous QAM Amber Perdomo MD   insulin lispro 1-5 Units Subcutaneous TID Natacha Phan MD   insulin lispro 1-5 Units Subcutaneous HS Amber Perdomo MD   insulin lispro 4 Units Subcutaneous TID With Meals Amber Perdomo MD   levalbuterol 1 25 mg Nebulization TID Kenya Gil MD   metoprolol tartrate 25 mg Oral Q12H [address] Stephenie Corona MD   nitroglycerin 0 4 mg Sublingual Q5 Min PRN Diane Wharton PA-C   pantoprazole 40 mg Oral BID AC Farheen Hollis MD   pravastatin 40 mg Oral Daily With Maureen Lopes MD   sodium chloride 3 mL Nebulization TID Estefania Jones MD        Today, Patient Was Seen By: Sathya Butcher MD    ** Please Note: Dictation voice to text software may have been used in the creation of this document   **

## 2018-12-15 NOTE — ASSESSMENT & PLAN NOTE
· CKD-3 with baseline creatinine 1 7 to 2  · Creatinine had increased to 2 2 - improved to 1 83  · Off Lasix since 12/11  · Renal US - no hydronephrosis  · Continue to monitor BMP daily

## 2018-12-15 NOTE — ASSESSMENT & PLAN NOTE
· Persistent collapse of right middle lobe   · Bronchoscopy on 12/14/18 - extensive mucous plugging noted - airway cleared with aggressive suctioning and saline flushes

## 2018-12-15 NOTE — PROGRESS NOTES
Progress Note - Pulmonary   Chuck Duncan 66 y o  male MRN: [id number]  Unit/Bed#: MS [facility] Encounter: [id number]      Assessment:  Acute Hypoxic respiartory failure, improving  R Hydropneumothorax  S/P chest tube  Resolving  COPD/ emphysema  On bronchodilators    Tobacco use      Plan:  CXr looks ok    S/P bronch      Will discontinue chest tube today    Will sign off   Please call if needed  Subjective:   No complain    Objective:   No resp distress      Vitals: Blood pressure (!) 187/99, pulse 71, temperature 97 5 °F (36 4 °C), temperature source Axillary, resp  rate 18, height 5' 2" (1 575 m), weight 62 1 kg (136 lb 14 5 oz), SpO2 95 % , NC, Body mass index is 25 04 kg/m²  Intake/Output Summary (Last 24 hours) at 12/15/18 1156  Last data filed at 12/15/18 0427   Gross per 24 hour   Intake              250 ml   Output              900 ml   Net             -650 ml         Physical Exam  Gen: Awake, alert, oriented x 3, no acute distress  HEENT: Mucous membranes moist, no oral lesions, no thrush  NECK: No accessory muscle use, JVP not elevated  Cardiac: Regular, single S1, single S2, no murmurs, no rubs, no gallops  Lungs: Scattered rhonchi  Abdomen: normoactive bowel sounds, soft nontender, nondistended, no rebound or rigidity, no guarding  Extremities: no cyanosis, no clubbing, no edema    Labs: I have personally reviewed pertinent lab results  , ABG: No results found for: PHART, CWP4SEG, PO2ART, CST0YYG, E3ETPAAH, BEART, SOURCE, BNP: No results found for: BNP, CBC:   Lab Results   Component Value Date    WBC 13 57 (H) 12/15/2018    HGB 10 4 (L) 12/15/2018    HCT 31 3 (L) 12/15/2018     (H) 12/15/2018     12/15/2018    MCH 34 0 12/15/2018    MCHC 33 2 12/15/2018    RDW 15 0 12/15/2018    MPV 10 9 12/15/2018    NRBC 0 12/15/2018   , CMP:   Lab Results   Component Value Date    SODIUM 137 12/15/2018    K 4 1 12/15/2018     12/15/2018    CO2 27 12/15/2018    BUN 40 (H) 12/15/2018 CREATININE 1 83 (H) 12/15/2018    CALCIUM 9 0 12/15/2018    EGFR 35 12/15/2018   , PT/INR: No results found for: PT, INR, Troponin: No results found for: TROPONINI    Results from last 7 days  Lab Units 12/15/18  0755 12/13/18  0633 12/12/18  0616  12/11/18  1259   WBC Thousand/uL 13 57*  --  10 32*  --  15 91*   HEMOGLOBIN g/dL 10 4* 10 8* 10 3*  < > 10 9*   HEMATOCRIT % 31 3* 32 3* 30 4*  --  31 9*   PLATELETS Thousands/uL 284  --  213  --  246   NEUTROS PCT % 69  --  71  --  77*   MONOS PCT % 9  --  10  --  9   < > = values in this interval not displayed  Results from last 7 days  Lab Units 12/15/18  0755 12/14/18  0546 12/13/18  0633  12/10/18  0452 12/09/18  1037   POTASSIUM mmol/L 4 1 4 5 4 0  < > 3 7 4 2   CHLORIDE mmol/L 101 105 104  < > 108 102   CO2 mmol/L 27 26 22  < > 21 22   BUN mg/dL 40* 36* 34*  < > 42* 68*   CREATININE mg/dL 1 83* 1 84* 2 21*  < > 1 75* 2 08*   CALCIUM mg/dL 9 0 9 1 8 9  < > 8 5 8 7   ALK PHOS U/L  --   --   --   --  49 55   ALT U/L  --   --   --   --  20 26   AST U/L  --   --   --   --  17 19   < > = values in this interval not displayed  Results from last 7 days  Lab Units 12/13/18  0633 12/12/18  0616 12/11/18  1259   MAGNESIUM mg/dL 2 0 1 7 1 6            Results from last 7 days  Lab Units 12/09/18  1037   INR  1 05   PTT seconds 24*       Results from last 7 days  Lab Units 12/09/18  1351   LACTIC ACID mmol/L 0 9       0  Lab Value Date/Time   TROPONINI 0 35 (H) 12/10/2018 2024   TROPONINI 0 40 (H) 12/10/2018 1912   TROPONINI 0 27 (H) 12/10/2018 1543   TROPONINI 0 32 (H) 12/10/2018 1247   TROPONINI 0 33 (H) 12/10/2018 0854   TROPONINI 0 35 (H) 12/10/2018 0452   TROPONINI 0 24 (H) 12/09/2018 2256   TROPONINI 0 11 (H) 12/09/2018 1937   TROPONINI 0 03 12/09/2018 1435       Imaging and other studies: I have personally reviewed pertinent reports     and I have personally reviewed pertinent films in PACS  CXR looks ok       MD [facility]

## 2018-12-15 NOTE — ASSESSMENT & PLAN NOTE
· Multifactorial - due to mucus plugging, right-sided pneumothorax and volume overload  · Currently on 4 L of supplemental oxygen via nasal cannula    ·   S/p IV lasix   · Status post chest tube placement and removal for right-sided pneumothorax  · Status post bronchoscopy with aggressive suctioning of mucus plug  · Ct O2 to maintain sats greater than or equal to 88%  ·

## 2018-12-15 NOTE — ASSESSMENT & PLAN NOTE
· Right-sided hydropneumothorax   · Status post chest tube placement by IR on 12/11/18 - removed by pulmonology today  · Repeat chest x-ray in am   ·

## 2018-12-15 NOTE — ASSESSMENT & PLAN NOTE
Lab Results   Component Value Date    HGBA1C 8 7 (H) 12/10/2018       Recent Labs      12/14/18   2104  12/15/18   0807  12/15/18   1112  12/15/18   1610   POCGLU  206*  251*  221*  240*       Blood Sugar Average: Last 72 hrs:  (P) 263    Oral medications held  Blood sugars not at goal  Increase dose of Lantus to 18 units daily, continue Humalog 4 units t i d  and SSI

## 2018-12-15 NOTE — ASSESSMENT & PLAN NOTE
· Felt to be likely due to anemia in the setting of coronary artery disease per Cardiology  · Elevated troponin to 0 4 felt to be likely a type 2 non STEMI due to acute anemia  · Transfuse to keep hemoglobin greater than 9  · Plavix restarted on 12/11  ·

## 2018-12-16 ENCOUNTER — APPOINTMENT (INPATIENT)
Dept: RADIOLOGY | Facility: HOSPITAL | Age: 78
DRG: 377 | End: 2018-12-16
Payer: COMMERCIAL

## 2018-12-16 VITALS
BODY MASS INDEX: 24.67 KG/M2 | HEIGHT: 62 IN | SYSTOLIC BLOOD PRESSURE: 165 MMHG | HEART RATE: 77 BPM | WEIGHT: 134.04 LBS | OXYGEN SATURATION: 89 % | DIASTOLIC BLOOD PRESSURE: 72 MMHG | TEMPERATURE: 98.3 F | RESPIRATION RATE: 18 BRPM

## 2018-12-16 PROBLEM — N17.9 ACUTE KIDNEY INJURY (HCC): Status: RESOLVED | Noted: 2018-12-09 | Resolved: 2018-12-16

## 2018-12-16 PROBLEM — D72.829 LEUKOCYTOSIS: Status: RESOLVED | Noted: 2018-12-09 | Resolved: 2018-12-16

## 2018-12-16 PROBLEM — K92.2 GI BLEED: Status: RESOLVED | Noted: 2018-12-11 | Resolved: 2018-12-16

## 2018-12-16 PROBLEM — I47.2 NSVT (NONSUSTAINED VENTRICULAR TACHYCARDIA) (HCC): Status: RESOLVED | Noted: 2018-12-12 | Resolved: 2018-12-16

## 2018-12-16 PROBLEM — R07.9 CHEST PAIN: Status: RESOLVED | Noted: 2018-12-09 | Resolved: 2018-12-16

## 2018-12-16 PROBLEM — J96.01 ACUTE RESPIRATORY FAILURE WITH HYPOXIA (HCC): Status: RESOLVED | Noted: 2018-12-11 | Resolved: 2018-12-16

## 2018-12-16 PROBLEM — J93.9 PNEUMOTHORAX: Status: RESOLVED | Noted: 2018-12-11 | Resolved: 2018-12-16

## 2018-12-16 LAB
GLUCOSE SERPL-MCNC: 270 MG/DL (ref 65–140)
GLUCOSE SERPL-MCNC: 299 MG/DL (ref 65–140)

## 2018-12-16 PROCEDURE — 99239 HOSP IP/OBS DSCHRG MGMT >30: CPT | Performed by: INTERNAL MEDICINE

## 2018-12-16 PROCEDURE — 71046 X-RAY EXAM CHEST 2 VIEWS: CPT

## 2018-12-16 PROCEDURE — 94760 N-INVAS EAR/PLS OXIMETRY 1: CPT

## 2018-12-16 PROCEDURE — 94640 AIRWAY INHALATION TREATMENT: CPT

## 2018-12-16 PROCEDURE — 82948 REAGENT STRIP/BLOOD GLUCOSE: CPT

## 2018-12-16 PROCEDURE — 99232 SBSQ HOSP IP/OBS MODERATE 35: CPT | Performed by: INTERNAL MEDICINE

## 2018-12-16 RX ORDER — AMLODIPINE BESYLATE 2.5 MG/1
7.5 TABLET ORAL DAILY
Qty: 30 TABLET | Refills: 0 | Status: SHIPPED | OUTPATIENT
Start: 2018-12-16

## 2018-12-16 RX ORDER — LEVALBUTEROL 1.25 MG/.5ML
1.25 SOLUTION, CONCENTRATE RESPIRATORY (INHALATION)
Qty: 1 EACH | Refills: 0 | Status: SHIPPED | OUTPATIENT
Start: 2018-12-16 | End: 2019-01-23 | Stop reason: SDUPTHER

## 2018-12-16 RX ORDER — INSULIN GLARGINE 100 [IU]/ML
10 INJECTION, SOLUTION SUBCUTANEOUS
Qty: 10 ML | Refills: 0 | Status: SHIPPED | OUTPATIENT
Start: 2018-12-16

## 2018-12-16 RX ORDER — ACETAMINOPHEN 325 MG/1
650 TABLET ORAL EVERY 6 HOURS PRN
Qty: 30 TABLET | Refills: 0 | Status: SHIPPED | OUTPATIENT
Start: 2018-12-16

## 2018-12-16 RX ORDER — LISINOPRIL 40 MG/1
20 TABLET ORAL DAILY
Qty: 30 TABLET | Refills: 0 | Status: SHIPPED | OUTPATIENT
Start: 2018-12-16

## 2018-12-16 RX ORDER — ACETAMINOPHEN 325 MG/1
650 TABLET ORAL ONCE
Status: COMPLETED | OUTPATIENT
Start: 2018-12-16 | End: 2018-12-16

## 2018-12-16 RX ADMIN — ASPIRIN 81 MG: 81 TABLET, COATED ORAL at 08:55

## 2018-12-16 RX ADMIN — PANTOPRAZOLE SODIUM 40 MG: 40 TABLET, DELAYED RELEASE ORAL at 08:56

## 2018-12-16 RX ADMIN — METOPROLOL TARTRATE 25 MG: 25 TABLET, FILM COATED ORAL at 08:56

## 2018-12-16 RX ADMIN — LEVALBUTEROL HYDROCHLORIDE 1.25 MG: 1.25 SOLUTION, CONCENTRATE RESPIRATORY (INHALATION) at 07:45

## 2018-12-16 RX ADMIN — BUDESONIDE AND FORMOTEROL FUMARATE DIHYDRATE 2 PUFF: 160; 4.5 AEROSOL RESPIRATORY (INHALATION) at 09:35

## 2018-12-16 RX ADMIN — INSULIN LISPRO 2 UNITS: 100 INJECTION, SOLUTION INTRAVENOUS; SUBCUTANEOUS at 08:58

## 2018-12-16 RX ADMIN — ACETAMINOPHEN 650 MG: 325 TABLET, FILM COATED ORAL at 11:14

## 2018-12-16 RX ADMIN — AMLODIPINE BESYLATE 7.5 MG: 5 TABLET ORAL at 08:55

## 2018-12-16 RX ADMIN — ISODIUM CHLORIDE 3 ML: 0.03 SOLUTION RESPIRATORY (INHALATION) at 07:46

## 2018-12-16 RX ADMIN — INSULIN GLARGINE 18 UNITS: 100 INJECTION, SOLUTION SUBCUTANEOUS at 08:55

## 2018-12-16 RX ADMIN — CLOPIDOGREL 75 MG: 75 TABLET, FILM COATED ORAL at 08:56

## 2018-12-16 NOTE — PROGRESS NOTES
Pt new to insulin and neb tx's  RN provided education on insulin pens with both pt/spouse  RN reviewed neb tx eduction with respiratory therapist  Pt reports that he is comfortable administering meds

## 2018-12-16 NOTE — ASSESSMENT & PLAN NOTE
· Felt to be likely due to anemia in the setting of coronary artery disease per Cardiology  · Elevated troponin to 0 4 felt to be likely a type 2 non STEMI due to acute anemia  · Plavix restarted on 12/11  ·

## 2018-12-16 NOTE — PLAN OF CARE
Problem: Potential for Falls  Goal: Patient will remain free of falls  INTERVENTIONS:  - Assess patient frequently for physical needs  -  Identify cognitive and physical deficits and behaviors that affect risk of falls    -  [place] fall precautions as indicated by assessment   - Educate patient/family on patient safety including physical limitations  - Instruct patient to call for assistance with activity based on assessment  - Modify environment to reduce risk of injury  - Consider OT/PT consult to assist with strengthening/mobility   Outcome: Adequate for Discharge      Problem: Prexisting or High Potential for Compromised Skin Integrity  Goal: Skin integrity is maintained or improved  INTERVENTIONS:  - Identify patients at risk for skin breakdown  - Assess and monitor skin integrity  - Assess and monitor nutrition and hydration status  - Monitor labs (i e  albumin)  - Assess for incontinence   - Turn and reposition patient  - Assist with mobility/ambulation  - Relieve pressure over bony prominences  - Avoid friction and shearing  - Provide appropriate hygiene as needed including keeping skin clean and dry  - Evaluate need for skin moisturizer/barrier cream  - Collaborate with interdisciplinary team (i e  Nutrition, Rehabilitation, etc )   - Patient/family teaching   Outcome: Adequate for Discharge      Problem: PAIN - ADULT  Goal: Verbalizes/displays adequate comfort level or baseline comfort level  Interventions:  - Encourage patient to monitor pain and request assistance  - Assess pain using appropriate pain scale  - Administer analgesics based on type and severity of pain and evaluate response  - Implement non-pharmacological measures as appropriate and evaluate response  - Consider cultural and social influences on pain and pain management  - Notify physician/advanced practitioner if interventions unsuccessful or patient reports new pain   Outcome: Adequate for Discharge      Problem: SAFETY ADULT  Goal: Maintain or return to baseline ADL function  INTERVENTIONS:  -  Assess patient's ability to carry out ADLs; assess patient's baseline for ADL function and identify physical deficits which impact ability to perform ADLs (bathing, care of mouth/teeth, toileting, grooming, dressing, etc )  - Assess/evaluate cause of self-care deficits   - Assess range of motion  - Assess patient's mobility; develop plan if impaired  - Assess patient's need for assistive devices and provide as appropriate  - Encourage maximum independence but intervene and supervise when necessary  ¯ Involve family in performance of ADLs  ¯ Assess for home care needs following discharge   ¯ Request OT consult to assist with ADL evaluation and planning for discharge  ¯ Provide patient education as appropriate   Outcome: Adequate for Discharge    Goal: Maintain or return mobility status to optimal level  INTERVENTIONS:  - Assess patient's baseline mobility status (ambulation, transfers, stairs, etc )    - Identify cognitive and physical deficits and behaviors that affect mobility  - Identify mobility aids required to assist with transfers and/or ambulation (gait belt, sit-to-stand, lift, walker, cane, etc )  - [place] fall precautions as indicated by assessment  - Record patient progress and toleration of activity level on Mobility SBAR; progress patient to next Phase/Stage  - Instruct patient to call for assistance with activity based on assessment  - Request Rehabilitation consult to assist with strengthening/weightbearing, etc    Outcome: Adequate for Discharge      Problem: DISCHARGE PLANNING  Goal: Discharge to home or other facility with appropriate resources  INTERVENTIONS:  - Identify barriers to discharge w/patient and caregiver  - Arrange for needed discharge resources and transportation as appropriate  - Identify discharge learning needs (meds, wound care, etc )  - Refer to Case Management Department for coordinating discharge planning if the patient needs post-hospital services based on physician/advanced practitioner order or complex needs related to functional status, cognitive ability, or social support system   Outcome: Adequate for Discharge      Problem: GASTROINTESTINAL - ADULT  Goal: Maintains or returns to baseline bowel function  INTERVENTIONS:  - Assess bowel function  - Encourage oral fluids to ensure adequate hydration  - Administer IV fluids as ordered to ensure adequate hydration  - Administer ordered medications as needed  - Encourage mobilization and activity  - Nutrition services referral to assist patient with appropriate food choices   Outcome: Adequate for Discharge      Problem: METABOLIC, FLUID AND ELECTROLYTES - ADULT  Goal: Electrolytes maintained within normal limits  INTERVENTIONS:  - Monitor labs and assess patient for signs and symptoms of electrolyte imbalances  - Administer electrolyte replacement as ordered  - Monitor response to electrolyte replacements, including repeat lab results as appropriate  - Instruct patient on fluid and nutrition as appropriate   Outcome: Adequate for Discharge    Goal: Fluid balance maintained  INTERVENTIONS:  - Monitor labs and assess for signs and symptoms of volume excess or deficit  - Monitor I/O and WT  - Instruct patient on fluid and nutrition as appropriate   Outcome: Adequate for Discharge    Goal: Glucose maintained within target range  INTERVENTIONS:  - Monitor Blood Glucose as ordered  - Assess for signs and symptoms of hyperglycemia and hypoglycemia  - Administer ordered medications to maintain glucose within target range  - Assess nutritional intake and initiate nutrition service referral as needed   Outcome: Adequate for Discharge      Problem: DISCHARGE PLANNING - CARE MANAGEMENT  Goal: Discharge to post-acute care or home with appropriate resources  INTERVENTIONS:  - Conduct assessment to determine patient/family and health care team treatment goals, and need for post-acute services based on payer coverage, community resources, and patient preferences, and barriers to discharge  - Address psychosocial, clinical, and financial barriers to discharge as identified in assessment in conjunction with the patient/family and health care team  - Arrange appropriate level of post-acute services according to patient's   needs and preference and payer coverage in collaboration with the physician and health care team  - Communicate with and update the patient/family, physician, and health care team regarding progress on the discharge plan  - Arrange appropriate transportation to post-acute venues    Outcome: Adequate for Discharge      Problem: Nutrition/Hydration-ADULT  Goal: Nutrient/Hydration intake appropriate for improving, restoring or maintaining nutritional needs  Monitor and assess patient's nutrition/hydration status for malnutrition (ex- brittle hair, bruises, dry skin, pale skin and conjunctiva, muscle wasting, smooth red tongue, and disorientation)  Collaborate with interdisciplinary team and initiate plan and interventions as ordered  Monitor patient's weight and dietary intake as ordered or per policy  Utilize nutrition screening tool and intervene per policy  Determine patient's food preferences and provide high-protein, high-caloric foods as appropriate       INTERVENTIONS:  - Monitor oral intake, urinary output, labs, and treatment plans  - Assess nutrition and hydration status and recommend course of action  - Evaluate amount of meals eaten  - Assist patient with eating if necessary   - Allow adequate time for meals  - Recommend/ encourage appropriate diets, oral nutritional supplements, and vitamin/mineral supplements  - Order, calculate, and assess calorie counts as needed  - Recommend, monitor, and adjust tube feedings and TPN/PPN based on assessed needs  - Assess need for intravenous fluids  - Provide specific nutrition/hydration education as appropriate  - Include patient/family/caregiver in decisions related to nutrition   Outcome: Adequate for Discharge

## 2018-12-16 NOTE — ASSESSMENT & PLAN NOTE
· No exacerbation  · Ct Symbicort (160/4 5) 2 puffs BID, Xopenex neb 1 25 mg TID  · Outpatient pulmonary follow-up for pulmonary function test   · He will also need repeat CT scan of the chest in 8-12 weeks

## 2018-12-16 NOTE — ASSESSMENT & PLAN NOTE
· CKD-3 with baseline creatinine 1 7 to 2  · Creatinine had increased to 2 2 - improved to 1 83  · Off Lasix since 12/11  · Renal US - no hydronephrosis  ·

## 2018-12-16 NOTE — ASSESSMENT & PLAN NOTE
· Persistent collapse of right middle lobe   · Bronchoscopy on 12/14/18 - extensive mucous plugging noted - airway cleared with aggressive suctioning and saline flushes  · Continue with incentive spirometry

## 2018-12-16 NOTE — ASSESSMENT & PLAN NOTE
· Right-sided hydropneumothorax   · Status post chest tube placement by IR on 12/11/18 - removed by pulmonology on 12/14  Repeat chest x-ray with resolution of pneumothorax

## 2018-12-16 NOTE — DISCHARGE SUMMARY
Discharge- Hitesh Puja 1940, 66 y o  male MRN: [id number]    Unit/Bed#: -01 Encounter: [id number]    Primary Care Provider: Toyin Reed MD   Date and time admitted to hospital: 12/9/2018 10:10 AM        * Acute respiratory failure with hypoxia [facility])   Assessment & Plan    · Present on admission Multifactorial - due to mucus plugging, right-sided pneumothorax and volume overload  · Has since then resolved  Ambulatory O2 saturation 92% on room air with 6 minutes exertion  ·     · Status post chest tube placement and removal for right-sided pneumothorax  · Status post bronchoscopy with aggressive suctioning of mucus plug  · Will need close outpatient pulmonary follow-up  Appointment scheduled for 1/23  · Counseled patient on smoking cessation  ·      Dilated pancreatic duct   Assessment & Plan    · Dilated main pancreatic duct on CT  · Non emergent MRCP or ERCP     NSVT (nonsustained ventricular tachycardia) (HCC)   Assessment & Plan    · No recurrence after repletion of K/Mag  · Keep potassium greater than 4 and magnesium greater than 2  · Continue metoprolol     Atelectasis of right lung   Assessment & Plan    · Persistent collapse of right middle lobe   · Bronchoscopy on 12/14/18 - extensive mucous plugging noted - airway cleared with aggressive suctioning and saline flushes  · Continue with incentive spirometry     Right hydropneumothorax   Assessment & Plan    · Right-sided hydropneumothorax   · Status post chest tube placement by IR on 12/11/18 - removed by pulmonology on 12/14  Repeat chest x-ray with resolution of pneumothorax  GI bleed   Assessment & Plan    · Acute upper GI bleed due to duodenal ulcer with visible vessel treated endoscopically  · No recurrence of bleed at present  · Hemoglobin stable  · Continue Protonix 40 mg twice daily  · Monitor hemoglobin  · Plavix restarted on 12/11/18  · Close outpatient GI follow-up       Acute kidney injury [facility])   Assessment & Plan · CKD-3 with baseline creatinine 1 7 to 2  · Creatinine had increased to 2 2 - improved to 1 83  · Off Lasix since 12/11  · Renal US - no hydronephrosis  ·      Leukocytosis   Assessment & Plan    · Improved  · No evidence of infection  · Likely reactive       Chest pain   Assessment & Plan    · Felt to be likely due to anemia in the setting of coronary artery disease per Cardiology  · Elevated troponin to 0 4 felt to be likely a type 2 non STEMI due to acute anemia  · Plavix restarted on 12/11  ·      CAD (coronary artery disease)   Assessment & Plan    · Ct Plavix, BB     Hypertension   Assessment & Plan    · Intermittent increase when agitated  · Ct Amlodipine 5 mg daily, Metoprolol tartrate 25 mg BID     COPD (chronic obstructive pulmonary disease) ([facility])   Assessment & Plan    · No exacerbation  · Ct Symbicort (160/4 5) 2 puffs BID, Xopenex neb 1 25 mg TID  · Outpatient pulmonary follow-up for pulmonary function test   · He will also need repeat CT scan of the chest in 8-12 weeks  DM2 (diabetes mellitus, type 2) [facility])   Assessment & Plan    Lab Results   Component Value Date    HGBA1C 8 7 (H) 12/10/2018       Recent Labs      12/15/18   1112  12/15/18   1610  12/15/18   2103  12/16/18   0733   POCGLU  221*  240*  233*  270*       Blood Sugar Average: Last 72 hrs:  (P) 243 [id number]    Oral medications held  Blood sugars not at goal  Increase dose of Lantus to 18 units daily, continue Humalog 4 units t i d  and SSI        Stage 3 chronic kidney disease ([facility] [address] )   Assessment & Plan    · As noted above under NIDHI           Discharging Physician / Practitioner: Peggye Gottron, MD  PCP: Shanel Ellis MD  Admission Date:   Admission Orders     Ordered        12/09/18 1445  Inpatient Admission (expected length of stay for this patient is greater than two midnights)  Once             Discharge Date: 12/16/18    Resolved Problems  Date Reviewed: 12/15/2018          Resolved    PAD (peripheral artery disease) ([facility] [address] ) 12/9/2018     Resolved by  Daron Balbuena PA-C    Lactic acidosis 12/10/2018     Resolved by  Barbara Blanchard MD    Elevated lipase 12/14/2018     Resolved by  Ivan Burnett MD    Overview Signed 12/9/2018  3:27 PM by Daron Balbuena PA-C     · Elevated lipase with otherwise normal LFTs  · No associated abdominal pain  · CT A/P reviewed with recommendation for ERCP vs MRCP  · Consider RUQ ultrasound if develops pain or increasing LFTs  · GI consult pending               Consultations During Hospital Stay:  · Gastroenterology  · Pulmonology    Procedures Performed:     · Bronchoscopy  · CT chest:Moderate right hydropneumothorax  2   Scattered multifocal parenchymal opacities possibly combination of atelectasis and/or pneumonitis versus scarring  Recommend follow-up repeat CT of the chest in 3 months to evaluate for stability/resolution  3   Emphysema  · 4  4 mm fissural nodule in the left upper lobe in this patient with emphysema  · Renal ultrasound  No hydronephrosis   Unremarkable right kidney   2  Mild cortical thinning of the left kidney with mildly increased cortical echogenicity suggesting medical renal disease   3  Left renal cyst   The lower pole nodule seen on prior CT is found to be a simple appearing 1 6 cm cyst   No suspicious lesions are seen   Endoscopy:Upper GI bleed secondary to duodenal ulcers, large ulcer with visible vessel was since 2nd portion treated with epinephrine and gold probe with good ablation of visible vessel  ·     Incidental Findings:   · Ct Abdomen:Main pancreatic duct appears focally dilated at the head   Recommending follow-up with nonemergent MRCP or ERCP for further evaluation  · Thyroid nodules    Test Results Pending at Discharge (will require follow up):   · NA     Outpatient Tests Requested:  · Pulmonary function test  · Repeat CT chest in 8-12 weeks  · MRCP abdomen  · Thyroid Ultrasound    Complications: none   Reason for Admission:  [facility] Course:     Marianne Carrion is a 66 y o  male patient who originally presented to the hospital on 12/9/2018 due to 2 days history of black tarry stool and bright red bleeding per rectum  She was a was admitted with concern for GI bleed  Endoscopy revealed duodenal ulcer with large visible vessel the 2nd portion of the duodenum which was treated with epinephrine with ablation of the visible vessel  Use transfuse with packed RBCs  The patient also had elevated troponins were thought to be secondary to type 2 non ST elevation myocardial infarction  Cardiology evaluated him and adjusted his medications  Initially his aspirin and Plavix were on hold since because of GI bleeding but subsequently were started was cleared by Gastroenterology  The patient had a history of chronic kidney disease with baseline creatinine of 1 7-2  He also had mild acute kidney injury which improved with IV fluids with his ACE-inhibitor was put on hold initially  Please see above list of diagnoses and related plan for additional information  Patient during his stay in the hospital had a repeat x-rays done for substernal chest pain which showed right apical and basilar pneumothorax with right middle lobe atelectasis  He had a chest tube placed for that  He was maintained on diuretic therapy for possible vascular congestion  He was maintained on oxygen to keep his saturation between 88-94%  Serial chest x-rays were done and once there was no air leak is chest tube was subsequently removed  He underwent a bronchoscopy for atelectasis and was found to have diffuse mucus plugging  Bilateral lobes  No endobronchial lesions were visualized  He was recommended to continue with Symbicort inhaler with outpatient pulmonology follow-up for pulmonary function test and repeat CT chest in 8-12 weeks time  His ambulatory O2 saturation on the day of discharge was 92% and he did not require any home oxygen on discharge    Counseling was done regarding smoking cessation nicotine patch was prescribed  He was discharged home in a stable condition  Condition at Discharge: stable     Discharge Day Visit / Exam:     Subjective:  Patient seen and examined  Denies any chest pain shortness of breath or cough  Remains afebrile  Vitals: Blood Pressure: 165/72 (12/15/18 2234)  Pulse: 77 (12/15/18 2234)  Temperature: 98 3 °F (36 8 °C) (12/15/18 2234)  Temp Source: Oral (12/15/18 2234)  Respirations: 18 (12/15/18 2234)  Height: 5' 2" (157 5 cm) (12/14/18 1225)  Weight - Scale: 60 8 kg (134 lb 0 6 oz) (12/16/18 0600)  SpO2: (!) 89 % (12/16/18 0847)  Exam:   Physical Exam   Constitutional: He is oriented to person, place, and time  No distress  HENT:   Head: Normocephalic and atraumatic  Mouth/Throat: Oropharynx is clear and moist    Eyes: Pupils are equal, round, and reactive to light  Conjunctivae are normal    Neck: Normal range of motion  Neck supple  Cardiovascular: Normal rate and regular rhythm  Pulmonary/Chest:   Course breath sounds, no inspiratory or expiratory wheezing  Abdominal: Soft  Bowel sounds are normal    Musculoskeletal: He exhibits no edema  Neurological: He is alert and oriented to person, place, and time  Skin: Skin is warm and dry  He is not diaphoretic  (  Discussion with Family:  Discussed with son at length over the phone    Discharge instructions/Information to patient and family:   See after visit summary for information provided to patient and family  Provisions for Follow-Up Care:  See after visit summary for information related to follow-up care and any pertinent home health orders  Disposition:     Home    For Discharges to [facility] SNF:   · Not Applicable to this Patient - Not Applicable to this Patient    Planned Readmission: NO     Discharge Statement:  I spent 40minutes discharging the patient  This time was spent on the day of discharge   I had direct contact with the patient on the day of discharge  Greater than 50% of the total time was spent examining patient, answering all patient questions, arranging and discussing plan of care with patient as well as directly providing post-discharge instructions  Additional time then spent on discharge activities  Discharge Medications:  See after visit summary for reconciled discharge medications provided to patient and family        ** Please Note: This note has been constructed using a voice recognition system **

## 2018-12-16 NOTE — ASSESSMENT & PLAN NOTE
Lab Results   Component Value Date    HGBA1C 8 7 (H) 12/10/2018       Recent Labs      12/15/18   1112  12/15/18   1610  12/15/18   2103  12/16/18   0733   POCGLU  221*  240*  233*  270*       Blood Sugar Average: Last 72 hrs:  (P) 243 [id number]    Oral medications held  Blood sugars not at goal  Increase dose of Lantus to 18 units daily, continue Humalog 4 units t i d  and SSI

## 2018-12-16 NOTE — ASSESSMENT & PLAN NOTE
· Present on admission Multifactorial - due to mucus plugging, right-sided pneumothorax and volume overload  · Has since then resolved  Ambulatory O2 saturation 92% on room air with 6 minutes exertion  ·     · Status post chest tube placement and removal for right-sided pneumothorax  · Status post bronchoscopy with aggressive suctioning of mucus plug  · Will need close outpatient pulmonary follow-up  Appointment scheduled for 1/23  · Counseled patient on smoking cessation    ·

## 2018-12-16 NOTE — PROGRESS NOTES
Progress Note - Pulmonary   Yajaira Leah 66 y o  male MRN: [id number]  Unit/Bed#: -Titi Encounter: [id number]      Assessment:  Acute Hypoxic respiartory failure, improving  R Hydropneumothorax  S/P chest tube    Removed yesterday    CXR looks ok this am     COPD/ emphysema  On bronchodilators    Tobacco use    Plan:  Ok for discharge pulmonary wise  Home O2 if needed    Follow up as an Op     Subjective:   No resp complain   Has back pain    Objective:       Vitals: Blood pressure 165/72, pulse 77, temperature 98 3 °F (36 8 °C), temperature source Oral, resp  rate 18, height 5' 2" (1 575 m), weight 60 8 kg (134 lb 0 6 oz), SpO2 (!) 89 %  ,RA, Body mass index is 24 52 kg/m²  Intake/Output Summary (Last 24 hours) at 12/16/18 1323  Last data filed at 12/16/18 1126   Gross per 24 hour   Intake              510 ml   Output             1300 ml   Net             -790 ml         Physical Exam  Gen: Awake, alert, oriented x 3, no acute distress  HEENT: Mucous membranes moist, no oral lesions, no thrush  NECK: No accessory muscle use, JVP not elevated  Cardiac: Regular, single S1, single S2, no murmurs, no rubs, no gallops  Lungs: good airflow    Abdomen: normoactive bowel sounds, soft nontender, nondistended, no rebound or rigidity, no guarding  Extremities: no cyanosis, no clubbing, no edema    Labs: I have personally reviewed pertinent lab results  , ABG: No results found for: PHART, ENQ2YAE, PO2ART, HTQ1RBO, L5XZINWC, BEART, SOURCE, BNP: No results found for: BNP, CBC: No results found for: WBC, HGB, HCT, MCV, PLT, ADJUSTEDWBC, MCH, MCHC, RDW, MPV, NRBC, CMP: No results found for: SODIUM, K, CL, CO2, ANIONGAP, BUN, CREATININE, GLUCOSE, CALCIUM, AST, ALT, ALKPHOS, PROT, BILITOT, EGFR, PT/INR: No results found for: PT, INR, Troponin: No results found for: TROPONINI    Results from last 7 days  Lab Units 12/15/18  0755 12/13/18  0633 12/12/18  0616  12/11/18  1259   WBC Thousand/uL 13 57*  --  10 32*  --  15 91* HEMOGLOBIN g/dL 10 4* 10 8* 10 3*  < > 10 9*   HEMATOCRIT % 31 3* 32 3* 30 4*  --  31 9*   PLATELETS Thousands/uL 284  --  213  --  246   NEUTROS PCT % 69  --  71  --  77*   MONOS PCT % 9  --  10  --  9   < > = values in this interval not displayed  Results from last 7 days  Lab Units 12/15/18  0755 12/14/18  0546 12/13/18  0633  12/10/18  0452   POTASSIUM mmol/L 4 1 4 5 4 0  < > 3 7   CHLORIDE mmol/L 101 105 104  < > 108   CO2 mmol/L 27 26 22  < > 21   BUN mg/dL 40* 36* 34*  < > 42*   CREATININE mg/dL 1 83* 1 84* 2 21*  < > 1 75*   CALCIUM mg/dL 9 0 9 1 8 9  < > 8 5   ALK PHOS U/L  --   --   --   --  49   ALT U/L  --   --   --   --  20   AST U/L  --   --   --   --  17   < > = values in this interval not displayed  Results from last 7 days  Lab Units 12/13/18  0633 12/12/18  0616 12/11/18  1259   MAGNESIUM mg/dL 2 0 1 7 1 6                Results from last 7 days  Lab Units 12/09/18  1351   LACTIC ACID mmol/L 0 9       0  Lab Value Date/Time   TROPONINI 0 35 (H) 12/10/2018 2024   TROPONINI 0 40 (H) 12/10/2018 1912   TROPONINI 0 27 (H) 12/10/2018 1543   TROPONINI 0 32 (H) 12/10/2018 1247   TROPONINI 0 33 (H) 12/10/2018 0854   TROPONINI 0 35 (H) 12/10/2018 0452   TROPONINI 0 24 (H) 12/09/2018 2256   TROPONINI 0 11 (H) 12/09/2018 1937   TROPONINI 0 03 12/09/2018 1435       Imaging and other studies: I have personally reviewed pertinent reports     and I have personally reviewed pertinent films in PACS        Reginald Benavides MD  [address]

## 2018-12-16 NOTE — PLAN OF CARE
Problem: DISCHARGE PLANNING - CARE MANAGEMENT  Goal: Discharge to post-acute care or home with appropriate resources  INTERVENTIONS:  - Conduct assessment to determine patient/family and health care team treatment goals, and need for post-acute services based on payer coverage, community resources, and patient preferences, and barriers to discharge  - Address psychosocial, clinical, and financial barriers to discharge as identified in assessment in conjunction with the patient/family and health care team  - Arrange appropriate level of post-acute services according to patient's   needs and preference and payer coverage in collaboration with the physician and health care team  - Communicate with and update the patient/family, physician, and health care team regarding progress on the discharge plan  - Arrange appropriate transportation to post-acute venues    Outcome: Completed Date Met: 12/16/18  Patient is medically stable for discharge home today  Patient will not require home O2 for discharge but will need a nebulizer  CM sent script and supporting documents to [facility]  This will be delivered to patient's home  Patient's is also agreeable to VNA and a referral was made to [facility]  Patient was accepted and this was added to the AVS   Patient's son will be providing transportation home  CM reviewed the availability of treatment team to discuss questions or concerns patient and/or family may have regarding  understanding medications and recognizing signs and symptoms once discharged  CM also encouraged patient to follow up with all recommended appointments after discharge  Patient advised of importance for patient and family to participate in managing patients medical well being

## 2018-12-16 NOTE — PLAN OF CARE
Problem: Potential for Falls  Goal: Patient will remain free of falls  INTERVENTIONS:  - Assess patient frequently for physical needs  -  Identify cognitive and physical deficits and behaviors that affect risk of falls    -  Trail fall precautions as indicated by assessment   - Educate patient/family on patient safety including physical limitations  - Instruct patient to call for assistance with activity based on assessment  - Modify environment to reduce risk of injury  - Consider OT/PT consult to assist with strengthening/mobility   Outcome: Progressing      Problem: Prexisting or High Potential for Compromised Skin Integrity  Goal: Skin integrity is maintained or improved  INTERVENTIONS:  - Identify patients at risk for skin breakdown  - Assess and monitor skin integrity  - Assess and monitor nutrition and hydration status  - Monitor labs (i e  albumin)  - Assess for incontinence   - Turn and reposition patient  - Assist with mobility/ambulation  - Relieve pressure over bony prominences  - Avoid friction and shearing  - Provide appropriate hygiene as needed including keeping skin clean and dry  - Evaluate need for skin moisturizer/barrier cream  - Collaborate with interdisciplinary team (i e  Nutrition, Rehabilitation, etc )   - Patient/family teaching   Outcome: Progressing      Problem: PAIN - ADULT  Goal: Verbalizes/displays adequate comfort level or baseline comfort level  Interventions:  - Encourage patient to monitor pain and request assistance  - Assess pain using appropriate pain scale  - Administer analgesics based on type and severity of pain and evaluate response  - Implement non-pharmacological measures as appropriate and evaluate response  - Consider cultural and social influences on pain and pain management  - Notify physician/advanced practitioner if interventions unsuccessful or patient reports new pain   Outcome: Progressing      Problem: SAFETY ADULT  Goal: Maintain or return to baseline ADL function  INTERVENTIONS:  -  Assess patient's ability to carry out ADLs; assess patient's baseline for ADL function and identify physical deficits which impact ability to perform ADLs (bathing, care of mouth/teeth, toileting, grooming, dressing, etc )  - Assess/evaluate cause of self-care deficits   - Assess range of motion  - Assess patient's mobility; develop plan if impaired  - Assess patient's need for assistive devices and provide as appropriate  - Encourage maximum independence but intervene and supervise when necessary  ¯ Involve family in performance of ADLs  ¯ Assess for home care needs following discharge   ¯ Request OT consult to assist with ADL evaluation and planning for discharge  ¯ Provide patient education as appropriate   Outcome: Progressing    Goal: Maintain or return mobility status to optimal level  INTERVENTIONS:  - Assess patient's baseline mobility status (ambulation, transfers, stairs, etc )    - Identify cognitive and physical deficits and behaviors that affect mobility  - Identify mobility aids required to assist with transfers and/or ambulation (gait belt, sit-to-stand, lift, walker, cane, etc )  - [place] fall precautions as indicated by assessment  - Record patient progress and toleration of activity level on Mobility SBAR; progress patient to next Phase/Stage  - Instruct patient to call for assistance with activity based on assessment  - Request Rehabilitation consult to assist with strengthening/weightbearing, etc    Outcome: Progressing      Problem: DISCHARGE PLANNING  Goal: Discharge to home or other facility with appropriate resources  INTERVENTIONS:  - Identify barriers to discharge w/patient and caregiver  - Arrange for needed discharge resources and transportation as appropriate  - Identify discharge learning needs (meds, wound care, etc )  - Refer to Case Management Department for coordinating discharge planning if the patient needs post-hospital services based on physician/advanced practitioner order or complex needs related to functional status, cognitive ability, or social support system   Outcome: Progressing      Problem: GASTROINTESTINAL - ADULT  Goal: Maintains or returns to baseline bowel function  INTERVENTIONS:  - Assess bowel function  - Encourage oral fluids to ensure adequate hydration  - Administer IV fluids as ordered to ensure adequate hydration  - Administer ordered medications as needed  - Encourage mobilization and activity  - Nutrition services referral to assist patient with appropriate food choices   Outcome: Progressing      Problem: METABOLIC, FLUID AND ELECTROLYTES - ADULT  Goal: Electrolytes maintained within normal limits  INTERVENTIONS:  - Monitor labs and assess patient for signs and symptoms of electrolyte imbalances  - Administer electrolyte replacement as ordered  - Monitor response to electrolyte replacements, including repeat lab results as appropriate  - Instruct patient on fluid and nutrition as appropriate   Outcome: Progressing    Goal: Fluid balance maintained  INTERVENTIONS:  - Monitor labs and assess for signs and symptoms of volume excess or deficit  - Monitor I/O and WT  - Instruct patient on fluid and nutrition as appropriate   Outcome: Progressing    Goal: Glucose maintained within target range  INTERVENTIONS:  - Monitor Blood Glucose as ordered  - Assess for signs and symptoms of hyperglycemia and hypoglycemia  - Administer ordered medications to maintain glucose within target range  - Assess nutritional intake and initiate nutrition service referral as needed   Outcome: Progressing      Problem: DISCHARGE PLANNING - CARE MANAGEMENT  Goal: Discharge to post-acute care or home with appropriate resources  INTERVENTIONS:  - Conduct assessment to determine patient/family and health care team treatment goals, and need for post-acute services based on payer coverage, community resources, and patient preferences, and barriers to discharge  - Address psychosocial, clinical, and financial barriers to discharge as identified in assessment in conjunction with the patient/family and health care team  - Arrange appropriate level of post-acute services according to patient's   needs and preference and payer coverage in collaboration with the physician and health care team  - Communicate with and update the patient/family, physician, and health care team regarding progress on the discharge plan  - Arrange appropriate transportation to post-acute venues    Outcome: Progressing      Problem: Nutrition/Hydration-ADULT  Goal: Nutrient/Hydration intake appropriate for improving, restoring or maintaining nutritional needs  Monitor and assess patient's nutrition/hydration status for malnutrition (ex- brittle hair, bruises, dry skin, pale skin and conjunctiva, muscle wasting, smooth red tongue, and disorientation)  Collaborate with interdisciplinary team and initiate plan and interventions as ordered  Monitor patient's weight and dietary intake as ordered or per policy  Utilize nutrition screening tool and intervene per policy  Determine patient's food preferences and provide high-protein, high-caloric foods as appropriate       INTERVENTIONS:  - Monitor oral intake, urinary output, labs, and treatment plans  - Assess nutrition and hydration status and recommend course of action  - Evaluate amount of meals eaten  - Assist patient with eating if necessary   - Allow adequate time for meals  - Recommend/ encourage appropriate diets, oral nutritional supplements, and vitamin/mineral supplements  - Order, calculate, and assess calorie counts as needed  - Recommend, monitor, and adjust tube feedings and TPN/PPN based on assessed needs  - Assess need for intravenous fluids  - Provide specific nutrition/hydration education as appropriate  - Include patient/family/caregiver in decisions related to nutrition   Outcome: Progressing

## 2018-12-16 NOTE — SOCIAL WORK
Patient is medically stable for discharge home today  Patient will not require home O2 for discharge but will need a nebulizer  CM sent script and supporting documents to [facility] [place] [place] [facility]  This will be delivered to patient's home  Patient's is also agreeable to VNA and a referral was made to [facility]  Patient was accepted and this was added to the AVS   Patient's son will be providing transportation home  CM reviewed the availability of treatment team to discuss questions or concerns patient and/or family may have regarding  understanding medications and recognizing signs and symptoms once discharged  CM also encouraged patient to follow up with all recommended appointments after discharge  Patient advised of importance for patient and family to participate in managing patients medical well being

## 2018-12-18 NOTE — UTILIZATION REVIEW
Notification of Discharge  This is a Notification of Discharge from our facility [address]  Please be advised that this patient has been discharge from our facility  Below you will find the admission and discharge date and time including the patients disposition  PRESENTATION DATE: 12/9/2018 10:10 AM  IP ADMISSION DATE: 12/9/18 1445  DISCHARGE DATE: 12/16/2018  2:26 PM  DISPOSITION: Home with [address] Utilization Review Department  Phone: [phone]; Fax [phone]  ATTENTION: Please call with any questions or concerns to [phone]  and carefully listen to the prompts so that you are directed to the right person  Send all requests for admission clinical reviews, approved or denied determinations and any other requests to fax [phone]   All voicemails are confidential   Reference #[id number]

## 2019-01-18 RX ORDER — GLIPIZIDE 2.5 MG/1
2.5 TABLET, EXTENDED RELEASE ORAL
COMMUNITY
Start: 2018-10-23 | End: 2019-10-23

## 2019-01-18 RX ORDER — CHLORTHALIDONE 50 MG/1
TABLET ORAL
Refills: 1 | COMMUNITY
Start: 2018-10-25

## 2019-01-18 RX ORDER — SODIUM BICARBONATE 650 MG/1
TABLET ORAL
COMMUNITY
Start: 2018-04-10

## 2019-01-18 RX ORDER — GLYBURIDE 5 MG/1
TABLET ORAL
COMMUNITY
Start: 2016-06-02

## 2019-01-18 RX ORDER — CALCIUM CITRATE/VITAMIN D3 200MG-6.25
TABLET ORAL
Refills: 6 | COMMUNITY
Start: 2018-11-14

## 2019-01-18 RX ORDER — CALCITRIOL 0.25 UG/1
CAPSULE, LIQUID FILLED ORAL
COMMUNITY
Start: 2018-07-17

## 2019-01-18 RX ORDER — HYDROCHLOROTHIAZIDE 50 MG/1
TABLET ORAL EVERY 24 HOURS
COMMUNITY
Start: 2016-06-07

## 2019-01-23 ENCOUNTER — OFFICE VISIT (OUTPATIENT)
Dept: PULMONOLOGY | Facility: CLINIC | Age: 79
End: 2019-01-23
Payer: COMMERCIAL

## 2019-01-23 VITALS
DIASTOLIC BLOOD PRESSURE: 66 MMHG | HEIGHT: 62 IN | BODY MASS INDEX: 28.45 KG/M2 | HEART RATE: 72 BPM | OXYGEN SATURATION: 98 % | WEIGHT: 154.6 LBS | SYSTOLIC BLOOD PRESSURE: 148 MMHG | TEMPERATURE: 97.6 F

## 2019-01-23 DIAGNOSIS — J44.9 CHRONIC OBSTRUCTIVE PULMONARY DISEASE, UNSPECIFIED COPD TYPE (HCC): ICD-10-CM

## 2019-01-23 DIAGNOSIS — J98.11 ATELECTASIS OF RIGHT LUNG: Primary | ICD-10-CM

## 2019-01-23 PROCEDURE — 99214 OFFICE O/P EST MOD 30 MIN: CPT | Performed by: PHYSICIAN ASSISTANT

## 2019-01-23 RX ORDER — PEN NEEDLE, DIABETIC 30 GX5/16"
NEEDLE, DISPOSABLE MISCELLANEOUS
COMMUNITY
Start: 2019-01-02

## 2019-01-23 RX ORDER — BUDESONIDE AND FORMOTEROL FUMARATE DIHYDRATE 160; 4.5 UG/1; UG/1
2 AEROSOL RESPIRATORY (INHALATION) 2 TIMES DAILY
Qty: 1 INHALER | Refills: 3 | Status: SHIPPED | OUTPATIENT
Start: 2019-01-23

## 2019-01-23 RX ORDER — LEVALBUTEROL 1.25 MG/.5ML
1.25 SOLUTION, CONCENTRATE RESPIRATORY (INHALATION)
Qty: 90 VIAL | Refills: 3 | Status: SHIPPED | OUTPATIENT
Start: 2019-01-23 | End: 2019-01-23 | Stop reason: SDUPTHER

## 2019-01-23 RX ORDER — LEVALBUTEROL 1.25 MG/.5ML
SOLUTION, CONCENTRATE RESPIRATORY (INHALATION)
Qty: 270 ML | Refills: 2 | Status: SHIPPED | OUTPATIENT
Start: 2019-01-23

## 2019-01-23 NOTE — ASSESSMENT & PLAN NOTE
1  Patient continuing to improve clinically  2  Due to his significant smoking history and atelectasis of the right lower lobe during his last hospital admission, recommend follow-up CT chest without contrast   3  CT chest w/o contrast ordered  Will call with results  4  Follow-up in 3 months or sooner if needed  5  Patient understands and agrees to the plan

## 2019-01-23 NOTE — ASSESSMENT & PLAN NOTE
1  Patient is continuing to improve clinically  2  Continue Symbicort 2 puffs twice a day  Refills sent  3  Continue Xopenex nebulizer TID prn for wheezing or shortness of breath  Refills sent  4  PFTs ordered  5  Follow-up with results in 3 months or sooner if needed  6  Patient made aware to call with any questions of concerns  7  Patient understands and agrees to the plan

## 2019-01-23 NOTE — PROGRESS NOTES
Pulmonary Follow Up Note   Andrea Marin 66 y o  male MRN: [id number]  1/23/2019      Assessment:    COPD (chronic obstructive pulmonary disease) ([place] [address] )  1  Patient is continuing to improve clinically  2  Continue Symbicort 2 puffs twice a day  Refills sent  3  Continue Xopenex nebulizer TID prn for wheezing or shortness of breath  Refills sent  4  PFTs ordered  5  Follow-up with results in 3 months or sooner if needed  6  Patient made aware to call with any questions of concerns  7  Patient understands and agrees to the plan  Atelectasis of right lung  8  Patient continuing to improve clinically  9  Due to his significant smoking history and atelectasis of the right lower lobe during his last hospital admission, recommend follow-up CT chest without contrast   10  CT chest w/o contrast ordered  Will call with results  11  Follow-up in 3 months or sooner if needed  12  Patient understands and agrees to the plan  Plan:    Diagnoses and all orders for this visit:    Atelectasis of right lung  -     CT chest wo contrast; Future    Chronic obstructive pulmonary disease, unspecified COPD type ([place] [address] )  -     Discontinue: levalbuterol (XOPENEX) 1 25 mg/0 5 mL nebulizer solution; Take 0 5 mL (1 25 mg total) by nebulization 3 (three) times a day  -     Complete pulmonary function test; Future  -     budesonide-formoterol (SYMBICORT) 160-4 5 mcg/act inhaler; Inhale 2 puffs 2 (two) times a day  -     levalbuterol (XOPENEX) 1 25 mg/0 5 mL nebulizer solution; Take 0 5 mL (1 25 mg total) by nebulization 3 (three) times a day    Other orders  -     Discontinue: aspirin 81 MG tablet; Take 81 mg by mouth  -     Exenatide (BYETTA 10 MCG PEN) 10 MCG/0 04ML SOPN; [id number] mcg by subcutaneous route 2 times every day before morning and evening meals  -     calcitriol (ROCALTROL) 0 25 mcg capsule; Take 1 capsule by mouth three times a week  -     chlorthalidone (HYGROTEN) 50 MG tablet;  Take 1 tab by mouth daily for high blood pressure  -     glipiZIDE (GLUCOTROL XL) 2 5 mg 24 hr tablet; Take 2 5 mg by mouth  -     TRUE METRIX BLOOD GLUCOSE TEST test strip; AS DIRECTED TWICE DAILY  -     glyBURIDE (DIABETA) 5 mg tablet; take 2 tablet by oral route 2 times every day  -     hydrochlorothiazide (HYDRODIURIL) 50 mg tablet; every 24 hours  -     metFORMIN (GLUCOPHAGE) 500 mg tablet; Every 12 hours  -     sodium bicarbonate 650 mg tablet; Take 1 tablet by mouth once a day  -     Insulin Pen Needle (PEN NEEDLES 3/16") 31G X 5 MM MISC; Use 1x daily  Return in about 3 months (around 4/23/2019), or if symptoms worsen or fail to improve  History of Present Illness   HPI:  Blake Funes is a 66 y o  male who is seen in the office today for hospital follow-up  Patient was hospitalized at [address] [place] [address] in mid December for a GI bleed and incidental pneumothorax with right middle lobe collapse found incidentally on CT and chest tube placement  Patient states that since discharge, he continues to improve  He feels more short of breath with exertion from his baseline when working with PT but continues to improve every day  Denies SOB at rest  Admits to mild chest pain immediately after using his nebulizer that resolves in a couple of minutes  Reports his chronic cough is much improved  Denies sputum production or hemoptysis  Denies recent fevers, chills, dysphagia, wheeze, or leg swelling  Patient recently quit smoking 12/7/18  He has a history of 1 ppd for the past 60 years       Review of Systems    Historical Information   Past Medical History:   Diagnosis Date    BPH (benign prostatic hyperplasia)     CKD (chronic kidney disease)     COPD [facility]chronic obstructive pulmonary disease) ([facility] )     Coronary artery disease     Diabetes mellitus ([facility] )     History of heart attack     Hyperlipidemia     Hypertension     PAD (peripheral artery disease) ([facility] )      Past Surgical History:   Procedure Laterality Date    APPENDECTOMY  BACK SURGERY      x2 - lumbar spine    ESOPHAGOGASTRODUODENOSCOPY N/A 12/10/2018    Procedure: ESOPHAGOGASTRODUODENOSCOPY (EGD); Surgeon: Kuldeep Patterson MD;  [facility]; Service: Gastroenterology    EYE SURGERY Bilateral 10/2018, 11/2018    IR CHEST TUBE  12/11/2018    [place] BRONCHOSCOPY,DIAGNOSTIC N/A 12/14/2018    Procedure: BRONCHOSCOPY FLEXIBLE;  Surgeon: Chelo Juarez DO;  [facility];   Service: Pulmonary    VASCULAR SURGERY      stent     Family History   Problem Relation Age of Onset    COPD Mother        History   Smoking Status    Former Smoker    Packs/day: 0 50    Years: 60 00    Types: Cigarettes    Quit date: 12/7/2018   Smokeless Tobacco    Never Used     Comment: Previously 1-2 ppd         Meds/Allergies     Current Outpatient Prescriptions:     acetaminophen (TYLENOL) 325 mg tablet, Take 2 tablets (650 mg total) by mouth every 6 (six) hours as needed for mild pain, Disp: 30 tablet, Rfl: 0    amLODIPine (NORVASC) 2 5 mg tablet, Take 3 tablets (7 5 mg total) by mouth daily, Disp: 30 tablet, Rfl: 0    aspirin (ECOTRIN LOW STRENGTH) 81 mg EC tablet, Take 81 mg by mouth daily, Disp: , Rfl:     budesonide-formoterol (SYMBICORT) 160-4 5 mcg/act inhaler, Inhale 2 puffs 2 (two) times a day, Disp: 1 Inhaler, Rfl: 3    calcitriol (ROCALTROL) 0 25 mcg capsule, Take 1 capsule by mouth three times a week, Disp: , Rfl:     chlorthalidone (HYGROTEN) 50 MG tablet, Take 1 tab by mouth daily for high blood pressure, Disp: , Rfl: 1    clopidogrel (PLAVIX) 75 mg tablet, Take 75 mg by mouth daily, Disp: , Rfl:     Exenatide (BYETTA 10 MCG PEN) 10 MCG/0 04ML SOPN, [id number] mcg by subcutaneous route 2 times every day before morning and evening meals, Disp: , Rfl:     glipiZIDE (GLUCOTROL XL) 2 5 mg 24 hr tablet, Take 2 5 mg by mouth, Disp: , Rfl:     glyBURIDE (DIABETA) 5 mg tablet, take 2 tablet by oral route 2 times every day, Disp: , Rfl:     hydrochlorothiazide (HYDRODIURIL) 50 mg tablet, every 24 hours, Disp: , Rfl:     insulin glargine (LANTUS) 100 units/mL subcutaneous injection, Inject 10 Units under the skin daily at bedtime, Disp: 10 mL, Rfl: 0    Insulin Pen Needle (PEN NEEDLES 3/16") 31G X 5 MM MISC, Use 1x daily  , Disp: , Rfl:     levalbuterol (XOPENEX) 1 25 mg/0 5 mL nebulizer solution, Take 0 5 mL (1 25 mg total) by nebulization 3 (three) times a day, Disp: 90 vial, Rfl: 3    Linagliptin (TRADJENTA) 5 MG TABS, Take 5 mg by mouth daily, Disp: , Rfl:     lisinopril (ZESTRIL) 40 mg tablet, Take 0 5 tablets (20 mg total) by mouth daily, Disp: 30 tablet, Rfl: 0    metFORMIN (GLUCOPHAGE) 500 mg tablet, Every 12 hours, Disp: , Rfl:     metoprolol tartrate (LOPRESSOR) 25 mg tablet, Take 25 mg by mouth every 12 (twelve) hours, Disp: , Rfl:     pantoprazole (PROTONIX) 40 mg tablet, Take 1 tablet (40 mg total) by mouth 2 (two) times a day before meals, Disp: 60 tablet, Rfl: 0    pravastatin (PRAVACHOL) 40 mg tablet, Take 40 mg by mouth daily, Disp: , Rfl:     repaglinide (PRANDIN) 0 5 mg tablet, Take 0 5 mg by mouth 3 (three) times a day before meals, Disp: , Rfl:     sodium bicarbonate 650 mg tablet, Take 1 tablet by mouth once a day, Disp: , Rfl:     tamsulosin (FLOMAX) 0 4 mg, Take 0 4 mg by mouth daily with dinner, Disp: , Rfl:     TRUE METRIX BLOOD GLUCOSE TEST test strip, AS DIRECTED TWICE DAILY, Disp: , Rfl: 6  Allergies   Allergen Reactions    Contrast [Iodinated Diagnostic Agents] Hives       Vitals: Blood pressure 148/66, pulse 72, temperature 97 6 °F (36 4 °C), temperature source Tympanic, height 5' 2" (1 575 m), weight 70 1 kg (154 lb 9 6 oz), SpO2 98 %  Body mass index is 28 28 kg/m²  Oxygen Therapy  SpO2: 98 %  Oxygen Therapy: None (Room air)    Physical Exam  Physical Exam   Constitutional: He is oriented to person, place, and time  He appears well-developed and well-nourished  No distress  HENT:   Head: Normocephalic and atraumatic     Mouth/Throat: Oropharynx is clear and moist  No oropharyngeal exudate  Eyes: Pupils are equal, round, and reactive to light  EOM are normal    Neck: Neck supple  No JVD present  No tracheal deviation present  Cardiovascular: Normal rate, regular rhythm and normal heart sounds  Exam reveals no friction rub  No murmur heard  Pulmonary/Chest: Effort normal  No respiratory distress  He has no wheezes  He has no rales  He exhibits no tenderness  Deminished breath sounds   Abdominal: Soft  Bowel sounds are normal  He exhibits no distension  There is no tenderness  Musculoskeletal: Normal range of motion  He exhibits no edema, tenderness or deformity  Lymphadenopathy:     He has no cervical adenopathy  Neurological: He is alert and oriented to person, place, and time  No cranial nerve deficit  Skin: Skin is warm and dry  No rash noted  He is not diaphoretic  No erythema  Psychiatric: He has a normal mood and affect  His behavior is normal  Judgment and thought content normal        Labs: I have personally reviewed pertinent lab results  Lab Results   Component Value Date    WBC 13 57 (H) 12/15/2018    HGB 10 4 (L) 12/15/2018    HCT 31 3 (L) 12/15/2018     (H) 12/15/2018     12/15/2018     Lab Results   Component Value Date    CALCIUM 9 0 12/15/2018    K 4 1 12/15/2018    CO2 27 12/15/2018     12/15/2018    BUN 40 (H) 12/15/2018    CREATININE 1 83 (H) 12/15/2018     No results found for: IGE  Lab Results   Component Value Date    ALT 20 12/10/2018    AST 17 12/10/2018    ALKPHOS 49 12/10/2018       Imaging and other studies: None to review today  Pulmonary function testing:  None to review

## (undated) DEVICE — BIPOLAR ELECTROHEMOSTASIS CATHETER: Brand: GOLD PROBE